# Patient Record
Sex: FEMALE | Race: WHITE | Employment: FULL TIME | URBAN - METROPOLITAN AREA
[De-identification: names, ages, dates, MRNs, and addresses within clinical notes are randomized per-mention and may not be internally consistent; named-entity substitution may affect disease eponyms.]

---

## 2017-05-25 ENCOUNTER — TELEPHONE (OUTPATIENT)
Dept: INTERNAL MEDICINE CLINIC | Age: 32
End: 2017-05-25

## 2017-05-26 ENCOUNTER — TELEPHONE (OUTPATIENT)
Dept: INTERNAL MEDICINE CLINIC | Age: 32
End: 2017-05-26

## 2017-05-26 DIAGNOSIS — G93.5 CHIARI I MALFORMATION (HCC): ICD-10-CM

## 2017-05-26 DIAGNOSIS — G89.29 CHRONIC NONINTRACTABLE HEADACHE, UNSPECIFIED HEADACHE TYPE: Primary | ICD-10-CM

## 2017-05-26 DIAGNOSIS — R51.9 FREQUENT HEADACHES: Primary | ICD-10-CM

## 2017-05-26 DIAGNOSIS — R51.9 CHRONIC NONINTRACTABLE HEADACHE, UNSPECIFIED HEADACHE TYPE: Primary | ICD-10-CM

## 2017-05-30 ENCOUNTER — HOSPITAL ENCOUNTER (OUTPATIENT)
Dept: MRI IMAGING | Age: 32
Discharge: HOME | End: 2017-05-30
Attending: INTERNAL MEDICINE | Admitting: INTERNAL MEDICINE

## 2017-05-30 ENCOUNTER — TELEPHONE (OUTPATIENT)
Dept: INTERNAL MEDICINE CLINIC | Age: 32
End: 2017-05-30

## 2017-05-30 DIAGNOSIS — R51.9 HEADACHE: ICD-10-CM

## 2017-05-30 DIAGNOSIS — R51.9 FREQUENT HEADACHES: ICD-10-CM

## 2017-05-30 DIAGNOSIS — G93.5 CHIARI I MALFORMATION (HCC): ICD-10-CM

## 2017-06-01 ENCOUNTER — OFFICE VISIT (OUTPATIENT)
Dept: INTERNAL MEDICINE CLINIC | Age: 32
End: 2017-06-01

## 2017-06-01 VITALS
RESPIRATION RATE: 16 BRPM | WEIGHT: 113.8 LBS | DIASTOLIC BLOOD PRESSURE: 60 MMHG | HEIGHT: 62 IN | BODY MASS INDEX: 20.94 KG/M2 | HEART RATE: 70 BPM | SYSTOLIC BLOOD PRESSURE: 90 MMHG

## 2017-06-01 DIAGNOSIS — G44.89 OTHER HEADACHE SYNDROME: ICD-10-CM

## 2017-06-01 DIAGNOSIS — H53.9 TRANSIENT VISUAL DISTURBANCE, BILATERAL: ICD-10-CM

## 2017-06-01 DIAGNOSIS — Z00.00 ANNUAL PHYSICAL EXAM: Primary | ICD-10-CM

## 2017-06-01 PROCEDURE — 99395 PREV VISIT EST AGE 18-39: CPT | Performed by: INTERNAL MEDICINE

## 2017-06-01 ASSESSMENT — PATIENT HEALTH QUESTIONNAIRE - PHQ9
1. LITTLE INTEREST OR PLEASURE IN DOING THINGS: 0
SUM OF ALL RESPONSES TO PHQ9 QUESTIONS 1 & 2: 0
2. FEELING DOWN, DEPRESSED OR HOPELESS: 0
SUM OF ALL RESPONSES TO PHQ QUESTIONS 1-9: 0

## 2017-12-26 ENCOUNTER — TELEPHONE (OUTPATIENT)
Dept: INTERNAL MEDICINE CLINIC | Age: 32
End: 2017-12-26

## 2018-07-13 ENCOUNTER — TELEPHONE (OUTPATIENT)
Dept: INTERNAL MEDICINE CLINIC | Age: 33
End: 2018-07-13

## 2018-07-13 ENCOUNTER — OFFICE VISIT (OUTPATIENT)
Dept: INTERNAL MEDICINE CLINIC | Age: 33
End: 2018-07-13

## 2018-07-13 VITALS
OXYGEN SATURATION: 99 % | HEART RATE: 89 BPM | DIASTOLIC BLOOD PRESSURE: 62 MMHG | WEIGHT: 109.8 LBS | BODY MASS INDEX: 20.08 KG/M2 | SYSTOLIC BLOOD PRESSURE: 106 MMHG

## 2018-07-13 DIAGNOSIS — G43.009 MIGRAINE WITHOUT AURA AND WITHOUT STATUS MIGRAINOSUS, NOT INTRACTABLE: Primary | ICD-10-CM

## 2018-07-13 PROCEDURE — 99213 OFFICE O/P EST LOW 20 MIN: CPT | Performed by: INTERNAL MEDICINE

## 2018-07-13 RX ORDER — FROVATRIPTAN SUCCINATE 2.5 MG/1
TABLET, FILM COATED ORAL
Qty: 30 TABLET | Refills: 2 | Status: SHIPPED | OUTPATIENT
Start: 2018-07-13 | End: 2018-07-13

## 2018-07-13 RX ORDER — SUMATRIPTAN 100 MG/1
TABLET, FILM COATED ORAL
Qty: 9 TABLET | Refills: 11 | Status: SHIPPED | OUTPATIENT
Start: 2018-07-13 | End: 2019-07-23 | Stop reason: SDUPTHER

## 2018-07-13 ASSESSMENT — PATIENT HEALTH QUESTIONNAIRE - PHQ9
SUM OF ALL RESPONSES TO PHQ QUESTIONS 1-9: 0
1. LITTLE INTEREST OR PLEASURE IN DOING THINGS: 0
SUM OF ALL RESPONSES TO PHQ9 QUESTIONS 1 & 2: 0

## 2018-07-13 NOTE — TELEPHONE ENCOUNTER
Pt states the med below is not covered by insurance. But patient states the pharmacy said they will cover sumatriptan. She is asking if Dr Cartwright Sender can order this for her.     frovatriptan succinate (FROVA) 2.5 MG TABS 30 tablet 2 7/13/2018     Sig: Take one tablet at the onset of a headache, repeat in 2 hours if needed.  Take no more than 2 doses in a 24 hour period      Pharmacy:  [facility] [address], [facility] - F [phone]    #[phone]

## 2018-09-20 ENCOUNTER — OFFICE VISIT (OUTPATIENT)
Dept: INTERNAL MEDICINE CLINIC | Age: 33
End: 2018-09-20

## 2018-09-20 VITALS
HEIGHT: 62 IN | DIASTOLIC BLOOD PRESSURE: 60 MMHG | BODY MASS INDEX: 20.2 KG/M2 | WEIGHT: 109.8 LBS | RESPIRATION RATE: 16 BRPM | SYSTOLIC BLOOD PRESSURE: 118 MMHG | HEART RATE: 80 BPM

## 2018-09-20 DIAGNOSIS — Z00.00 ANNUAL PHYSICAL EXAM: Primary | ICD-10-CM

## 2018-09-20 DIAGNOSIS — Z23 NEED FOR INFLUENZA VACCINATION: ICD-10-CM

## 2018-09-20 PROCEDURE — 90682 RIV4 VACC RECOMBINANT DNA IM: CPT | Performed by: INTERNAL MEDICINE

## 2018-09-20 PROCEDURE — 90471 IMMUNIZATION ADMIN: CPT | Performed by: INTERNAL MEDICINE

## 2018-09-20 PROCEDURE — 99395 PREV VISIT EST AGE 18-39: CPT | Performed by: INTERNAL MEDICINE

## 2018-09-20 ASSESSMENT — PATIENT HEALTH QUESTIONNAIRE - PHQ9
2. FEELING DOWN, DEPRESSED OR HOPELESS: 0
SUM OF ALL RESPONSES TO PHQ QUESTIONS 1-9: 0
SUM OF ALL RESPONSES TO PHQ9 QUESTIONS 1 & 2: 0
SUM OF ALL RESPONSES TO PHQ QUESTIONS 1-9: 0
1. LITTLE INTEREST OR PLEASURE IN DOING THINGS: 0

## 2018-09-20 NOTE — PROGRESS NOTES
oropharyngeal exudate. Neck: Normal range of motion. Neck supple. No thyromegaly present. Cardiovascular: Normal rate, regular rhythm and normal heart sounds. Pulmonary/Chest: Effort normal and breath sounds normal. She has no wheezes. She has no rales. Abdominal: Soft. Musculoskeletal: She exhibits no edema. Lymphadenopathy:     She has no cervical adenopathy. Neurological: She is alert and oriented to person, place, and time. Vitals reviewed. Waist circumference 25 inches    Assessment:      Annual exam-  She is up-to-date with health maintenance. Check glucose and lipids. She will follow-up as needed.       Plan:      When necessary follow-up        Baylee Campos MD

## 2018-10-01 ENCOUNTER — OFFICE VISIT (OUTPATIENT)
Dept: DERMATOLOGY | Age: 33
End: 2018-10-01
Payer: COMMERCIAL

## 2018-10-01 DIAGNOSIS — L81.2 FRECKLES: ICD-10-CM

## 2018-10-01 DIAGNOSIS — D22.9 MULTIPLE NEVI: Primary | ICD-10-CM

## 2018-10-01 DIAGNOSIS — L68.0 HIRSUTISM: ICD-10-CM

## 2018-10-01 PROCEDURE — 99213 OFFICE O/P EST LOW 20 MIN: CPT | Performed by: DERMATOLOGY

## 2018-10-01 NOTE — PROGRESS NOTES
[facility] Dermatology  Jatin Berumen MD  [phone]      Jasmin Weaver  1985    29 y.o. female     Date of Visit: 10/1/2018    Chief Complaint: skin moles    History of Present Illness:    1. She presents today for evaluation of multiple nevi on the trunk and extremities. She reports 1 darker lesion on the right breast.  Not aware of any recent changes. She is also noted a couple nevi in her scalp. 2.  She has fair skin and freckles easily. 3.  She also complains of few terminal hairs on her chin. She has a family history of melanoma-her sister has a history of melanoma in situ. Engaged now - getting  next June. Review of Systems:  Skin: No rash or itching. Past Medical History, Family History, Surgical History, Medications and Allergies reviewed. Past Medical History:   Diagnosis Date    Chiari I malformation ([facility] [address]     repaired    Gastric ulcer     ? from advil    Vocal cord nodules     chronic hoarseness/ voice training no help     Past Surgical History:   Procedure Laterality Date    BRAIN SURGERY  2007    decomp surg/Shabbabian     WISDOM TOOTH EXTRACTION  age 17       No Known Allergies  Outpatient Prescriptions Marked as Taking for the 10/1/18 encounter (Office Visit) with Mehran Knutson MD   Medication Sig Dispense Refill    SUMAtriptan (IMITREX) 100 MG tablet Take one tablet at the onset of a headache, repeat in 2 hours if needed. Take no more than 2 doses in a 24 hour period. 9 tablet 11    desogestrel-ethinyl estradiol (APRI) 0.15-30 MG-MCG per tablet Take 1 tablet by mouth daily. 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 Physical Examination       The following were examined and determined to be normal: Psych/Neuro, Scalp/hair, Head/face, Conjunctivae/eyelids, Gums/teeth/lips, Neck, Breast/axilla/chest, Abdomen, Back, RUE, LUE, RLE, LLE and Nails/digits.     The following were examined and

## 2019-01-07 ENCOUNTER — TELEPHONE (OUTPATIENT)
Dept: DERMATOLOGY | Age: 34
End: 2019-01-07

## 2019-11-25 ENCOUNTER — OFFICE VISIT (OUTPATIENT)
Dept: ENT CLINIC | Age: 34
End: 2019-11-25
Payer: COMMERCIAL

## 2019-11-25 VITALS
DIASTOLIC BLOOD PRESSURE: 75 MMHG | BODY MASS INDEX: 20.24 KG/M2 | SYSTOLIC BLOOD PRESSURE: 112 MMHG | WEIGHT: 110 LBS | HEART RATE: 77 BPM | TEMPERATURE: 98.3 F | HEIGHT: 62 IN

## 2019-11-25 DIAGNOSIS — K13.79 MUCOCELE OF BUCCAL MUCOSA: Primary | ICD-10-CM

## 2019-11-25 PROCEDURE — 99243 OFF/OP CNSLTJ NEW/EST LOW 30: CPT | Performed by: OTOLARYNGOLOGY

## 2019-11-25 ASSESSMENT — ENCOUNTER SYMPTOMS
FACIAL SWELLING: 0
BLOOD IN STOOL: 0
VOMITING: 0
CHOKING: 0
CHEST TIGHTNESS: 0
VOICE CHANGE: 0
EYE PAIN: 0
TROUBLE SWALLOWING: 0
DIARRHEA: 0
SORE THROAT: 0
STRIDOR: 0
COLOR CHANGE: 0
SHORTNESS OF BREATH: 0
SINUS PRESSURE: 0
SINUS PAIN: 0
CONSTIPATION: 0
RHINORRHEA: 0
WHEEZING: 0
NAUSEA: 0
APNEA: 0
EYE DISCHARGE: 0
COUGH: 0
BACK PAIN: 0

## 2020-01-13 ENCOUNTER — OFFICE VISIT (OUTPATIENT)
Dept: DERMATOLOGY | Age: 35
End: 2020-01-13
Payer: COMMERCIAL

## 2020-01-13 PROCEDURE — 99213 OFFICE O/P EST LOW 20 MIN: CPT | Performed by: DERMATOLOGY

## 2020-01-13 RX ORDER — CLINDAMYCIN PHOSPHATE 10 UG/ML
LOTION TOPICAL
Qty: 60 ML | Refills: 2 | Status: SHIPPED | OUTPATIENT
Start: 2020-01-13 | End: 2020-02-28

## 2020-01-13 RX ORDER — MEDROXYPROGESTERONE ACETATE 10 MG/1
10 TABLET ORAL DAILY
Status: ON HOLD | COMMUNITY
End: 2021-03-26

## 2020-01-13 NOTE — PROGRESS NOTES
[facility] Dermatology  Adria Conteh Praveena Marie  1985    36 y.o. female     Date of Visit: 1/13/2020    Chief Complaint: skin moles    History of Present Illness:    1. She presents today for a full skin exam.  She reports multiple nevi on the trunk and extremities. The dark nevus on the right breast seen at last visit seems to be stable. 2.  She also complains of recent acne on the chin. Began soon after going off of OCP - currently trying to get pregnant. 3.  She also complains of mild dark hair growth on the lower chin. Not getting any worse. Worked up in the past for PCOS which was reportedly negative. 4.  She has a family history of melanoma-her sister and father both have a history of melanoma in situ. Review of Systems:  Skin: no rash. Past Medical History, Family History, Surgical History, Medications and Allergies reviewed. Past Medical History:   Diagnosis Date    Chiari I malformation ([address]     repaired    Gastric ulcer     ? from advil    Vocal cord nodules     chronic hoarseness/ voice training no help     Past Surgical History:   Procedure Laterality Date    BRAIN SURGERY  2007    decomp surg/Shabbabian     WISDOM TOOTH EXTRACTION  age 17       No Known Allergies  Outpatient Medications Marked as Taking for the 1/13/20 encounter (Office Visit) with Carmen Mendez MD   Medication Sig Dispense Refill    medroxyPROGESTERone (PROVERA) 10 MG tablet Take 10 mg by mouth daily      clindamycin (CLEOCIN T) 1 % lotion Apply to affected areas on the face twice daily for acne. 60 mL 2    SUMAtriptan (IMITREX) 100 MG tablet TAKE ONE TABLET BY MOUTH AT ONSET OF HEADACHE; MAY REPEAT ONE TABLET IN 2 HOURS IF NEEDED.  TAKE NO MORE THAN 2 DOSES IN A 24 HOUR PERIOD 9 tablet 8       Social History:  Occupation:  Works for [employer], Dad is [employer]      Physical Examination       The following were examined and determined to be normal: Psych/Neuro, Scalp/hair, Conjunctivae/eyelids, Gums/teeth/lips, Neck, Breast/axilla/chest, Abdomen, Back, RUE, LUE, RLE, LLE and Nails/digits. The following were examined and determined to be abnormal: Head/face. Well-appearing. 1.  Scattered on the trunk and extremities are multiple well-defined round oval uniformly brown macules and few papules. Right medial lower breast with a well-defined oval-shaped dark brown macule measuring 6 mm in size (no change compared to prior photo). 2.  Chin with few erythematous papules. 3.  Submental region and lower portion of the chin with few terminal hairs. Assessment and Plan     1. Multiple nevi - benign appearing    Sun protective behaviors and self skin examinations were encouraged. Call for any new or concerning lesions. 2. Adult acne - mild    Clindamycin solution twice daily as needed (edu re: pregnancy category B). 3. Hirsutism - very mild    Consider laser in the future (edu re no preg). 4.  Family history of melanoma -     Sun protective behaviors and self skin examinations were encouraged. Call for any new or concerning lesions. Return in about 1 year (around 1/13/2021).

## 2020-03-06 ENCOUNTER — OFFICE VISIT (OUTPATIENT)
Dept: PSYCHOLOGY | Age: 35
End: 2020-03-06
Payer: COMMERCIAL

## 2020-03-06 PROCEDURE — 90791 PSYCH DIAGNOSTIC EVALUATION: CPT | Performed by: PSYCHOLOGIST

## 2020-03-06 ASSESSMENT — PATIENT HEALTH QUESTIONNAIRE - PHQ9
1. LITTLE INTEREST OR PLEASURE IN DOING THINGS: 0
SUM OF ALL RESPONSES TO PHQ QUESTIONS 1-9: 0
SUM OF ALL RESPONSES TO PHQ QUESTIONS 1-9: 0
SUM OF ALL RESPONSES TO PHQ9 QUESTIONS 1 & 2: 0
2. FEELING DOWN, DEPRESSED OR HOPELESS: 0

## 2020-03-06 ASSESSMENT — ANXIETY QUESTIONNAIRES
2. NOT BEING ABLE TO STOP OR CONTROL WORRYING: 3-NEARLY EVERY DAY
3. WORRYING TOO MUCH ABOUT DIFFERENT THINGS: 2-OVER HALF THE DAYS
4. TROUBLE RELAXING: 2-OVER HALF THE DAYS
1. FEELING NERVOUS, ANXIOUS, OR ON EDGE: 2-OVER HALF THE DAYS
7. FEELING AFRAID AS IF SOMETHING AWFUL MIGHT HAPPEN: 0-NOT AT ALL
6. BECOMING EASILY ANNOYED OR IRRITABLE: 1-SEVERAL DAYS
5. BEING SO RESTLESS THAT IT IS HARD TO SIT STILL: 1-SEVERAL DAYS
GAD7 TOTAL SCORE: 11

## 2020-03-06 NOTE — PROGRESS NOTES
pleasure No  Impulsive behavior No  Speech    normal rate, normal volume and well articulated  Mood    Anxious  Affect    anxiety  Thought Content    intact  Thought Process    linear, goal directed and coherent  Associations    logical connections  Insight    Good  Judgment    Intact  Orientation    oriented to person, place, time, and general circumstances  Memory    recent and remote memory intact  Attention/Concentration    intact  Morbid ideation No  Suicide Assessment    no suicidal ideation    History:    Medications:   Current Outpatient Medications   Medication Sig Dispense Refill    clomiPHENE Citrate (CLOMID PO) Take by mouth      sertraline (ZOLOFT) 50 MG tablet Take 1 tablet by mouth daily 30 tablet 1    medroxyPROGESTERone (PROVERA) 10 MG tablet Take 10 mg by mouth daily      SUMAtriptan (IMITREX) 100 MG tablet TAKE ONE TABLET BY MOUTH AT ONSET OF HEADACHE; MAY REPEAT ONE TABLET IN 2 HOURS IF NEEDED. TAKE NO MORE THAN 2 DOSES IN A 24 HOUR PERIOD 9 tablet 8    therapeutic multivitamin-minerals (THERAGRAN-M) tablet Take 1 tablet by mouth daily. (Patient not taking: Reported on 1/13/2020) 30 tablet 11     No current facility-administered medications for this visit. Social History:   Social History     Socioeconomic History    Marital status:      Spouse name: Not on file    Number of children: Not on file    Years of education: Not on file    Highest education level: Not on file   Occupational History    Occupation: Sr  Marketing   Social Needs    Financial resource strain: Not on file    Food insecurity:     Worry: Not on file     Inability: Not on file    Transportation needs:     Medical: Not on file     Non-medical: Not on file   Tobacco Use    Smoking status: Never Smoker    Smokeless tobacco: Never Used   Substance and Sexual Activity    Alcohol use:  Yes     Alcohol/week: 0.0 - 12.0 standard drinks    Drug use: No    Sexual activity: Not on file   Lifestyle  Physical activity:     Days per week: Not on file     Minutes per session: Not on file    Stress: Not on file   Relationships    Social connections:     Talks on phone: Not on file     Gets together: Not on file     Attends Temple service: Not on file     Active member of club or organization: Not on file     Attends meetings of clubs or organizations: Not on file     Relationship status: Not on file    Intimate partner violence:     Fear of current or ex partner: Not on file     Emotionally abused: Not on file     Physically abused: Not on file     Forced sexual activity: Not on file   Other Topics Concern    Not on file   Social History Narrative    Not on file       TOBACCO:   reports that she has never smoked. She has never used smokeless tobacco.  ETOH:   reports current alcohol use. Family History:   Family History   Problem Relation Age of Onset    Other Brother         Chondrosarcoma       A:    Pt presenting with chronic anxiety disorder in the HERACLIO and Panic disorder spectrum. Focused on psycho-ed about the chronic nature of her anxiety disorder and the impact on her mind/body. She is apprehensive about starting her sertraline but after weighing the pros and cons, she is going to start per Dr Esthela Bo recommendation. Pt agreed to start tomorrow. Rest of appt focused on benefits of psychotherapy, pt a bit nervous about \"full dose\" of psychotherapy so going to start with SSRI medication trial and a \"low dose\" with me, we will focus on relaxation training skills to support daily anxiety management.      PHQ Scores 3/6/2020 2/28/2020 10/11/2019 9/20/2018 7/13/2018 6/1/2017   PHQ2 Score 0 0 0 0 0 0   PHQ9 Score 0 0 0 0 0 0     Interpretation of Total Score Depression Severity: 1-4 = Minimal depression, 5-9 = Mild depression, 10-14 = Moderate depression, 15-19 = Moderately severe depression, 20-27 = Severe depression    HERACLIO 7 SCORE 3/6/2020   HERACLIO-7 Total Score 11     Interpretation of HERACLIO-7

## 2020-03-10 NOTE — PATIENT INSTRUCTIONS
1. Start sertraline medication trial per Dr Tanesha Lomax  2. Consider referral for individual psychotherapy referral down the road  3. Slow down any amount  4. Read The Anxiety and Phobia workbook by Cheikh Rojas, PhD for anxiety management skills  5.  Return to clinic for Dr Nola Luu in 2 weeks, schedule 2 more consults every 2 weeks

## 2020-04-03 ENCOUNTER — TELEMEDICINE (OUTPATIENT)
Dept: PSYCHOLOGY | Age: 35
End: 2020-04-03
Payer: COMMERCIAL

## 2020-04-03 PROCEDURE — 90834 PSYTX W PT 45 MINUTES: CPT | Performed by: PSYCHOLOGIST

## 2020-04-03 NOTE — PROGRESS NOTES
Behavioral Health Consultation Follow-up  Silke Espitia PsyD  Psychologist  4/3/2020  9:38 AM    NOTE - this visit conducted via audiovisual means : MyChart, Doxy, etc, in accordance with CMS guidelines. Visit initiated at patient or caregiver request with permission to bill to insurer granted. Telemedicine APA guidelines were reviewed and discussed. Patient gave verbal consent for teleservices and will sign a consent form when feasible. Location of provider: [address] [place] [address] Internal medicine  Location of patient: home    Time spent with Patient: 45 minutes  This is patient's second  [facility] appointment. Reason for Consult:  HERACLIO, Panic disorder   Referring Provider: Imelda Choi MD  [address]  [place], [address]    Feedback given to PCP. S:    Last appt: 3/6. Anxiety has been \"better\" outside of work.  and pt working together to get more structure in their personal lives. Worry for work a bit worse due to wondering whether boss thinks pt is doing \"enough\". More psycho-ed about the chronic nature of her anxiety disorder, how she has mastered cognitive strategies and treatment should focus on more relaxation training/body focused strategies--she agreed. Psych med: 12 days 25 mg sertraline, first few days \"noticed a difference\", 12 days in increased sertraline 50 mg, 1 month in, feels it is helping    Stressor: Fertility: 2 rounds clomid.  Tested progesterone levels: so low so sent to fertility specialist last week, virtual consultation with infertility specialist on Monday    Work: Working from home which is a challenge    Healthy coping: Multiple walks per day with 7 month old lab pet    O:  MSE:    Appearance    alert, cooperative  Appetite normal  Sleep disturbance better  Fatigue better  Loss of pleasure No  Impulsive behavior No  Speech    normal rate, normal volume and well articulated  Mood    Anxiety better with new sertraline dose  Affect    normal affect  Thought Content

## 2020-04-17 ENCOUNTER — TELEMEDICINE (OUTPATIENT)
Dept: PSYCHOLOGY | Age: 35
End: 2020-04-17
Payer: COMMERCIAL

## 2020-04-17 PROCEDURE — 90832 PSYTX W PT 30 MINUTES: CPT | Performed by: PSYCHOLOGIST

## 2020-04-17 NOTE — PROGRESS NOTES
Behavioral Health Consultation Follow-up  Guerrero Jones PsyD  Psychologist  4/17/2020  8:35 AM      NOTE - this visit conducted via audiovisual means : MyChart, Doxy, etc, in accordance with CMS guidelines. During YPHQP-89 public health emergency. Visit initiated at patient or caregiver request with permission to bill to insurer granted. Telemedicine APA guidelines were reviewed and discussed. Patient gave verbal consent for teleservices and will sign a consent form when feasible. Location of provider: [facility] Primary care  Location of patient: home    Time spent with Patient: 22 minutes  This is patient's third  [facility] appointment. Reason for Consult:  HERACLIO  Referring Provider: Lisy Monteiro MD  [address] [facility], [address] [place] [address]    Feedback given to PCP. S:    Last appt: 4/3. Anxiety levels fairly well controlled, had 1 \"bad\" yesterday. Psych med: Feels sertraline 50 mg is working most days. More focus on refining breathing practice daily, bed time when perhaps cortisol levels a bit higher. CBT intervention: Going to keep \"should\" log so we can identify negative self talk that ignites this \"should fire\" which exacerbates her anxiety levels.      Physical movement: [place] classes on line, but \"not the same\" as going to studio, she is going to keep trying to get on track with some type of physical movement class 2 x per week    O:  MSE:    Appearance    alert, cooperative  Appetite normal  Sleep disturbance No  Fatigue No  Loss of pleasure No  Impulsive behavior No  Speech    normal rate, normal volume and well articulated  Mood    Anxiety levels beginning to be more consistently controlled with sertraline  Affect    normal affect  Thought Content    intact  Thought Process    linear, goal directed and coherent  Associations    logical connections  Insight    Good  Judgment    Intact  Orientation    oriented to person, place, time, and general circumstances  Memory    recent and remote

## 2020-05-01 ENCOUNTER — TELEMEDICINE (OUTPATIENT)
Dept: PSYCHOLOGY | Age: 35
End: 2020-05-01
Payer: COMMERCIAL

## 2020-05-01 PROCEDURE — 90834 PSYTX W PT 45 MINUTES: CPT | Performed by: PSYCHOLOGIST

## 2020-05-01 NOTE — PROGRESS NOTES
oriented to person, place, time, and general circumstances  Memory    recent and remote memory intact  Attention/Concentration    intact  Morbid ideation No  Suicide Assessment    no suicidal ideation      History:    Medications:   Current Outpatient Medications   Medication Sig Dispense Refill    clomiPHENE Citrate (CLOMID PO) Take by mouth      sertraline (ZOLOFT) 50 MG tablet Take 1 tablet by mouth daily 30 tablet 1    medroxyPROGESTERone (PROVERA) 10 MG tablet Take 10 mg by mouth daily      SUMAtriptan (IMITREX) 100 MG tablet TAKE ONE TABLET BY MOUTH AT ONSET OF HEADACHE; MAY REPEAT ONE TABLET IN 2 HOURS IF NEEDED. TAKE NO MORE THAN 2 DOSES IN A 24 HOUR PERIOD 9 tablet 8    therapeutic multivitamin-minerals (THERAGRAN-M) tablet Take 1 tablet by mouth daily. (Patient not taking: Reported on 1/13/2020) 30 tablet 11     No current facility-administered medications for this visit. Social History:   Social History     Socioeconomic History    Marital status:      Spouse name: Not on file    Number of children: Not on file    Years of education: Not on file    Highest education level: Not on file   Occupational History    Occupation: Sr  Marketing   Social Needs    Financial resource strain: Not on file    Food insecurity     Worry: Not on file     Inability: Not on file   [employer] needs     Medical: Not on file     Non-medical: Not on file   Tobacco Use    Smoking status: Never Smoker    Smokeless tobacco: Never Used   Substance and Sexual Activity    Alcohol use:  Yes     Alcohol/week: 0.0 - 12.0 standard drinks    Drug use: No    Sexual activity: Not on file   Lifestyle    Physical activity     Days per week: Not on file     Minutes per session: Not on file    Stress: Not on file   Relationships    Social connections     Talks on phone: Not on file     Gets together: Not on file     Attends Restorationism service: Not on file     Active member of club or organization: diaphragmatic breathing and add mountain/solid breathing exercise as well, daily if possible, in the am  7.  Return to clinic for Dr Bhumika Rodriguez in 2-3 weeks, understands will be VV

## 2020-06-29 ENCOUNTER — OFFICE VISIT (OUTPATIENT)
Dept: PRIMARY CARE CLINIC | Age: 35
End: 2020-06-29

## 2020-06-30 LAB
SARS-COV-2: NOT DETECTED
SOURCE: NORMAL

## 2020-09-17 LAB
C. TRACHOMATIS, EXTERNAL RESULT: NEGATIVE
N. GONORRHOEAE, EXTERNAL RESULT: NEGATIVE

## 2020-10-19 LAB
ABO, EXTERNAL RESULT: NORMAL
HEP B, EXTERNAL RESULT: NEGATIVE
HEPATITIS C ANTIBODY, EXTERNAL RESULT: NEGATIVE
HIV, EXTERNAL RESULT: NONREACTIVE
RPR, EXTERNAL RESULT: NONREACTIVE
RUBELLA TITER, EXTERNAL RESULT: NORMAL

## 2021-01-18 ENCOUNTER — OFFICE VISIT (OUTPATIENT)
Dept: DERMATOLOGY | Age: 36
End: 2021-01-18
Payer: COMMERCIAL

## 2021-01-18 VITALS — TEMPERATURE: 97.7 F

## 2021-01-18 DIAGNOSIS — D22.9 MULTIPLE NEVI: Primary | ICD-10-CM

## 2021-01-18 DIAGNOSIS — L70.9 ADULT ACNE: ICD-10-CM

## 2021-01-18 DIAGNOSIS — L30.9 DERMATITIS: ICD-10-CM

## 2021-01-18 PROCEDURE — 99213 OFFICE O/P EST LOW 20 MIN: CPT | Performed by: DERMATOLOGY

## 2021-01-18 NOTE — PROGRESS NOTES
[facility] Dermatology  Kenna De La Cruz MD  [address]  1985    30 y.o. female     Date of Visit: 1/18/2021    Chief Complaint: skin moles    History of Present Illness:    1. She presents today for a full skin examination. She reports multiple moles on the trunk and extremities-not aware of any changes in size, color, or shape. 2.  She also returns today to follow-up for adult acne-has been quiescent. 3.  She complains of a new onset pruritic eruption on the lower back. It typically improves with CeraVe cream.    She has a family history of melanoma-her sister and father both have a history of melanoma in situ. Review of Systems:  Skin: No other concerning skin lesions or growths. Past Medical History, Family History, Surgical History, Medications and Allergies reviewed. Past Medical History:   Diagnosis Date    Chiari I malformation ([facility] [address]     repaired    Gastric ulcer     ? from advil    Vocal cord nodules     chronic hoarseness/ voice training no help     Past Surgical History:   Procedure Laterality Date    BRAIN SURGERY  2007    decomp surg/Shabbabian     WISDOM TOOTH EXTRACTION  age 17       No Known Allergies  Outpatient Medications Marked as Taking for the 1/18/21 encounter (Office Visit) with Anna Kendall MD   Medication Sig Dispense Refill    Prenatal Vit-DSS-Fe Cbn-FA (PRENATAL ADVANTAGE PO) Take by mouth         Social History:  Occupation:  Works for [employer], Dad is Chayo Pittman      Physical Examination       The following were examined and determined to be normal: Psych/Neuro, Scalp/hair, Head/face, Conjunctivae/eyelids, Gums/teeth/lips, Neck, Breast/axilla/chest, Abdomen, RUE, LUE, RLE, LLE and Nails/digits. The following were examined and determined to be abnormal: Back. Well appearing. 1.  Trunk and extremities with multiple well defined round to oval smooth brown macules and papules. 2.  Clear.       3.  Lower back -

## 2021-03-15 LAB — GBS, EXTERNAL RESULT: POSITIVE

## 2021-03-26 ENCOUNTER — HOSPITAL ENCOUNTER (INPATIENT)
Age: 36
LOS: 3 days | Discharge: HOME | End: 2021-03-29
Attending: OBSTETRICS & GYNECOLOGY | Admitting: OBSTETRICS & GYNECOLOGY
Payer: COMMERCIAL

## 2021-03-26 ENCOUNTER — ANESTHESIA EVENT (OUTPATIENT)
Dept: LABOR AND DELIVERY | Age: 36
End: 2021-03-26
Payer: COMMERCIAL

## 2021-03-26 ENCOUNTER — ANESTHESIA (OUTPATIENT)
Dept: LABOR AND DELIVERY | Age: 36
End: 2021-03-26
Payer: COMMERCIAL

## 2021-03-26 PROBLEM — Z37.9 NORMAL LABOR: Status: ACTIVE | Noted: 2021-03-26

## 2021-03-26 LAB
ABO/RH: NORMAL
AMPHETAMINE SCREEN, URINE: NORMAL
ANTIBODY SCREEN: NORMAL
BARBITURATE SCREEN URINE: NORMAL
BASOPHILS ABSOLUTE: 0 K/UL (ref 0–0.2)
BASOPHILS RELATIVE PERCENT: 0.3 %
BENZODIAZEPINE SCREEN, URINE: NORMAL
BUPRENORPHINE URINE: NORMAL
CANNABINOID SCREEN URINE: NORMAL
COCAINE METABOLITE SCREEN URINE: NORMAL
EOSINOPHILS ABSOLUTE: 0 K/UL (ref 0–0.6)
EOSINOPHILS RELATIVE PERCENT: 0.3 %
HCT VFR BLD CALC: 36.5 % (ref 36–48)
HEMOGLOBIN: 12.7 G/DL (ref 12–16)
LYMPHOCYTES ABSOLUTE: 2 K/UL (ref 1–5.1)
LYMPHOCYTES RELATIVE PERCENT: 17.4 %
Lab: NORMAL
MCH RBC QN AUTO: 31.9 PG (ref 26–34)
MCHC RBC AUTO-ENTMCNC: 34.7 G/DL (ref 31–36)
MCV RBC AUTO: 91.9 FL (ref 80–100)
METHADONE SCREEN, URINE: NORMAL
MONOCYTES ABSOLUTE: 0.6 K/UL (ref 0–1.3)
MONOCYTES RELATIVE PERCENT: 5.4 %
NEUTROPHILS ABSOLUTE: 8.8 K/UL (ref 1.7–7.7)
NEUTROPHILS RELATIVE PERCENT: 76.6 %
OPIATE SCREEN URINE: NORMAL
OXYCODONE URINE: NORMAL
PDW BLD-RTO: 12.8 % (ref 12.4–15.4)
PH UA: 7
PHENCYCLIDINE SCREEN URINE: NORMAL
PLATELET # BLD: 248 K/UL (ref 135–450)
PMV BLD AUTO: 8.9 FL (ref 5–10.5)
PROPOXYPHENE SCREEN: NORMAL
RBC # BLD: 3.97 M/UL (ref 4–5.2)
SARS-COV-2, NAAT: NOT DETECTED
WBC # BLD: 11.5 K/UL (ref 4–11)

## 2021-03-26 PROCEDURE — 86901 BLOOD TYPING SEROLOGIC RH(D): CPT

## 2021-03-26 PROCEDURE — 85025 COMPLETE CBC W/AUTO DIFF WBC: CPT

## 2021-03-26 PROCEDURE — 2500000003 HC RX 250 WO HCPCS

## 2021-03-26 PROCEDURE — 10907ZC DRAINAGE OF AMNIOTIC FLUID, THERAPEUTIC FROM PRODUCTS OF CONCEPTION, VIA NATURAL OR ARTIFICIAL OPENING: ICD-10-PCS | Performed by: OBSTETRICS & GYNECOLOGY

## 2021-03-26 PROCEDURE — 86780 TREPONEMA PALLIDUM: CPT

## 2021-03-26 PROCEDURE — 87635 SARS-COV-2 COVID-19 AMP PRB: CPT

## 2021-03-26 PROCEDURE — 86900 BLOOD TYPING SEROLOGIC ABO: CPT

## 2021-03-26 PROCEDURE — 2580000003 HC RX 258: Performed by: OBSTETRICS & GYNECOLOGY

## 2021-03-26 PROCEDURE — 6360000002 HC RX W HCPCS: Performed by: OBSTETRICS & GYNECOLOGY

## 2021-03-26 PROCEDURE — 6360000002 HC RX W HCPCS: Performed by: ANESTHESIOLOGY

## 2021-03-26 PROCEDURE — 51702 INSERT TEMP BLADDER CATH: CPT

## 2021-03-26 PROCEDURE — 2580000003 HC RX 258: Performed by: NURSE ANESTHETIST, CERTIFIED REGISTERED

## 2021-03-26 PROCEDURE — 86850 RBC ANTIBODY SCREEN: CPT

## 2021-03-26 PROCEDURE — 1220000000 HC SEMI PRIVATE OB R&B

## 2021-03-26 PROCEDURE — 3700000025 EPIDURAL BLOCK: Performed by: ANESTHESIOLOGY

## 2021-03-26 PROCEDURE — 2500000003 HC RX 250 WO HCPCS: Performed by: NURSE ANESTHETIST, CERTIFIED REGISTERED

## 2021-03-26 PROCEDURE — 80307 DRUG TEST PRSMV CHEM ANLYZR: CPT

## 2021-03-26 PROCEDURE — 2500000003 HC RX 250 WO HCPCS: Performed by: ANESTHESIOLOGY

## 2021-03-26 RX ORDER — NALBUPHINE HCL 10 MG/ML
5 AMPUL (ML) INJECTION EVERY 4 HOURS PRN
Status: DISCONTINUED | OUTPATIENT
Start: 2021-03-26 | End: 2021-03-27

## 2021-03-26 RX ORDER — SODIUM CHLORIDE 0.9 % (FLUSH) 0.9 %
10 SYRINGE (ML) INJECTION EVERY 12 HOURS SCHEDULED
Status: DISCONTINUED | OUTPATIENT
Start: 2021-03-26 | End: 2021-03-27

## 2021-03-26 RX ORDER — SODIUM CHLORIDE 0.9 % (FLUSH) 0.9 %
10 SYRINGE (ML) INJECTION PRN
Status: DISCONTINUED | OUTPATIENT
Start: 2021-03-26 | End: 2021-03-27

## 2021-03-26 RX ORDER — SODIUM CHLORIDE 9 MG/ML
25 INJECTION, SOLUTION INTRAVENOUS PRN
Status: DISCONTINUED | OUTPATIENT
Start: 2021-03-26 | End: 2021-03-29 | Stop reason: HOSPADM

## 2021-03-26 RX ORDER — NALOXONE HYDROCHLORIDE 0.4 MG/ML
0.4 INJECTION, SOLUTION INTRAMUSCULAR; INTRAVENOUS; SUBCUTANEOUS PRN
Status: DISCONTINUED | OUTPATIENT
Start: 2021-03-26 | End: 2021-03-27

## 2021-03-26 RX ORDER — SODIUM CHLORIDE, SODIUM LACTATE, POTASSIUM CHLORIDE, CALCIUM CHLORIDE 600; 310; 30; 20 MG/100ML; MG/100ML; MG/100ML; MG/100ML
INJECTION, SOLUTION INTRAVENOUS CONTINUOUS
Status: DISCONTINUED | OUTPATIENT
Start: 2021-03-26 | End: 2021-03-26

## 2021-03-26 RX ORDER — SODIUM CHLORIDE, SODIUM LACTATE, POTASSIUM CHLORIDE, CALCIUM CHLORIDE 600; 310; 30; 20 MG/100ML; MG/100ML; MG/100ML; MG/100ML
INJECTION, SOLUTION INTRAVENOUS CONTINUOUS PRN
Status: DISCONTINUED | OUTPATIENT
Start: 2021-03-26 | End: 2021-03-27 | Stop reason: SDUPTHER

## 2021-03-26 RX ORDER — ONDANSETRON 2 MG/ML
4 INJECTION INTRAMUSCULAR; INTRAVENOUS EVERY 6 HOURS PRN
Status: DISCONTINUED | OUTPATIENT
Start: 2021-03-26 | End: 2021-03-29 | Stop reason: HOSPADM

## 2021-03-26 RX ORDER — BUPIVACAINE HYDROCHLORIDE 5 MG/ML
INJECTION, SOLUTION EPIDURAL; INTRACAUDAL PRN
Status: DISCONTINUED | OUTPATIENT
Start: 2021-03-26 | End: 2021-03-27 | Stop reason: SDUPTHER

## 2021-03-26 RX ORDER — DEXTROSE, SODIUM CHLORIDE, SODIUM LACTATE, POTASSIUM CHLORIDE, AND CALCIUM CHLORIDE 5; .6; .31; .03; .02 G/100ML; G/100ML; G/100ML; G/100ML; G/100ML
INJECTION, SOLUTION INTRAVENOUS CONTINUOUS
Status: DISCONTINUED | OUTPATIENT
Start: 2021-03-26 | End: 2021-03-29 | Stop reason: HOSPADM

## 2021-03-26 RX ORDER — ONDANSETRON 2 MG/ML
4 INJECTION INTRAMUSCULAR; INTRAVENOUS EVERY 6 HOURS PRN
Status: DISCONTINUED | OUTPATIENT
Start: 2021-03-26 | End: 2021-03-27

## 2021-03-26 RX ORDER — DIPHENHYDRAMINE HYDROCHLORIDE 50 MG/ML
25 INJECTION INTRAMUSCULAR; INTRAVENOUS EVERY 6 HOURS PRN
Status: DISCONTINUED | OUTPATIENT
Start: 2021-03-26 | End: 2021-03-27

## 2021-03-26 RX ORDER — LIDOCAINE HYDROCHLORIDE AND EPINEPHRINE 20; 5 MG/ML; UG/ML
INJECTION, SOLUTION EPIDURAL; INFILTRATION; INTRACAUDAL; PERINEURAL PRN
Status: DISCONTINUED | OUTPATIENT
Start: 2021-03-26 | End: 2021-03-27 | Stop reason: SDUPTHER

## 2021-03-26 RX ADMIN — NALBUPHINE HYDROCHLORIDE 5 MG: 10 INJECTION, SOLUTION INTRAMUSCULAR; INTRAVENOUS; SUBCUTANEOUS at 16:26

## 2021-03-26 RX ADMIN — Medication 2.5 MILLION UNITS: at 21:17

## 2021-03-26 RX ADMIN — SODIUM CHLORIDE, SODIUM LACTATE, POTASSIUM CHLORIDE, CALCIUM CHLORIDE AND DEXTROSE MONOHYDRATE: 5; 600; 310; 30; 20 INJECTION, SOLUTION INTRAVENOUS at 16:26

## 2021-03-26 RX ADMIN — SODIUM CHLORIDE, SODIUM LACTATE, POTASSIUM CHLORIDE, AND CALCIUM CHLORIDE: .6; .31; .03; .02 INJECTION, SOLUTION INTRAVENOUS at 13:00

## 2021-03-26 RX ADMIN — Medication 2.5 MILLION UNITS: at 16:54

## 2021-03-26 RX ADMIN — LIDOCAINE HYDROCHLORIDE,EPINEPHRINE BITARTRATE 4 ML: 20; .005 INJECTION, SOLUTION EPIDURAL; INFILTRATION; INTRACAUDAL; PERINEURAL at 22:21

## 2021-03-26 RX ADMIN — DEXTROSE MONOHYDRATE 5 MILLION UNITS: 5 INJECTION INTRAVENOUS at 12:53

## 2021-03-26 RX ADMIN — SODIUM CHLORIDE, POTASSIUM CHLORIDE, SODIUM LACTATE AND CALCIUM CHLORIDE: 600; 310; 30; 20 INJECTION, SOLUTION INTRAVENOUS at 12:54

## 2021-03-26 RX ADMIN — BUPIVACAINE HYDROCHLORIDE 4 ML: 5 INJECTION, SOLUTION EPIDURAL; INTRACAUDAL at 22:07

## 2021-03-26 RX ADMIN — Medication 1 MILLI-UNITS/MIN: at 17:56

## 2021-03-26 RX ADMIN — Medication 10 ML/HR: at 13:11

## 2021-03-26 RX ADMIN — Medication 10 ML: at 13:10

## 2021-03-26 ASSESSMENT — PAIN SCALES - GENERAL
PAINLEVEL_OUTOF10: 8
PAINLEVEL_OUTOF10: 0

## 2021-03-26 NOTE — FLOWSHEET NOTE
Spoke with Dr Kailee Garcia and orders given to admit patient to labor and delivery. Verbal orders given and patient admitted into room 2243.

## 2021-03-26 NOTE — PROGRESS NOTES
Pt is comfortable on epidural. Ctrxs q 3 min  EFM: cat 1  SVE: 3/100/-2, BBOW  Plan: AROM at next check.  Cont PCN   Electronically signed by Tonia Bowser MD on 3/26/2021 at 4:10 PM

## 2021-03-26 NOTE — H&P
Department of Obstetrics and Gynecology   Obstetrics History and Physical        CHIEF COMPLAINT:  contractions    HISTORY OF PRESENT ILLNESS:    Baylee Chamberlain  is a 30 y.o.  female at 38w0d presents with a chief complaint as above and is being admitted for latent labor. Patient was checked in the office 4 h ago, and has no cx change, but states that ctrxs pain is unbearable and would like to be admitted for pain control     Estimated Due Date: Estimated Date of Delivery: 21    PRENATAL CARE: Complicated by: 1. Type 1 Arnold Chiari malformation (was cleared for Epidural and ) 2. GBS pos 3. IUI pregnancy 4. PCOS    PAST OB HISTORY:  OB History        1    Para        Term                AB        Living           SAB        TAB        Ectopic        Molar        Multiple        Live Births                  Past Medical History:        Diagnosis Date    Chi[address] I malformation ([facility] [address]     repaired    Gastric ulcer     ? from advil    Vocal cord nodules     chronic hoarseness/ voice training no help     Past Surgical History:        Procedure Laterality Date    BRAIN SURGERY      decomp surg/Shabbabian     WISDOM TOOTH EXTRACTION  age 17     Allergies:  Patient has no known allergies.   Social History:    Social History     Socioeconomic History    Marital status:      Spouse name: Not on file    Number of children: Not on file    Years of education: Not on file    Highest education level: Not on file   Occupational History    Occupation: Sr  Marketing   Social Needs    Financial resource strain: Not on file    Food insecurity     Worry: Not on file     Inability: Not on file   [employer] needs     Medical: Not on file     Non-medical: Not on file   Tobacco Use    Smoking status: Never Smoker    Smokeless tobacco: Never Used   Substance and Sexual Activity    Alcohol use: Not Currently     Alcohol/week: 0.0 - 12.0 standard drinks    Drug use: No    Sexual Electronically signed by Stefanie Pinto MD on 3/26/2021 at 1:05 PM

## 2021-03-26 NOTE — ANESTHESIA PROCEDURE NOTES
Epidural Block    Patient location during procedure: OB  Start time: 3/26/2021 1:00 PM  End time: 3/26/2021 1:17 PM  Reason for block: labor epidural  Preanesthetic Checklist  Completed: patient identified, IV checked, site marked, risks and benefits discussed, surgical consent, monitors and equipment checked, pre-op evaluation, timeout performed, anesthesia consent given, oxygen available and patient being monitored  Epidural  Patient position: sitting  Prep: Betadine and site prepped and draped  Patient monitoring: continuous pulse ox and frequent blood pressure checks  Approach: midline  Location: lumbar (1-5)  Injection technique: YADI saline  Guidance: paresthesia technique  Provider prep: mask and sterile gloves  Needle  Needle type: Tuohy   Needle gauge: 17 G  Needle length: 3.5 in  Needle insertion depth: 4 cm  Catheter type: side hole  Catheter size: 20G. Catheter at skin depth: 9 cm  Test dose: negative  Assessment  Sensory level: T6  Hemodynamics: stable  Attempts: 1  Additional Notes  Sterile tech. , sitting position, Lidocaine skin wheel, secured with steri-strips, tegaderm dressing, dosed & infusion with Marcaine 0.125% with Fentanyl 3 mcg/ml, infusion rate at 3.3 ml every 20 minutes.

## 2021-03-26 NOTE — FLOWSHEET NOTE
Dr. Houston Arora at bedside. SVE:3/100/-2. Membranes intact.  Pt turned to far left side after check

## 2021-03-26 NOTE — PLAN OF CARE
Problem: Anxiety:  Goal: Level of anxiety will decrease  Description: Level of anxiety will decrease  Outcome: Ongoing     Problem: Fluid Volume - Imbalance:  Goal: Absence of intrapartum hemorrhage signs and symptoms  Description: Absence of intrapartum hemorrhage signs and symptoms  Outcome: Ongoing   No s.s  Problem: Infection - Intrapartum Infection:  Goal: Will show no infection signs and symptoms  Description: Will show no infection signs and symptoms  Outcome: Ongoing  Problem: Pain:  Goal: Control of acute pain  Description: Control of acute pain  Outcome: Ongoing

## 2021-03-26 NOTE — FLOWSHEET NOTE
Bedside handoff report received from [facility] CAREY, care assumed at this time. Patient provided with fresh ice water. Denies further needs at this time.  Call light in reach

## 2021-03-26 NOTE — ANESTHESIA PRE PROCEDURE
in sodium chloride 0.9% 100 mL epidural    Continuous PRN Krishna Sidles, APRN - CRNA 10 mL/hr at 03/26/21 1311 10 mL/hr at 03/26/21 1311    fentaNYL 3 mcg/ml bupivacaine 0.125% in sodium chloride 0.9% 100 mL epidural    PRN Krishna Sidles, APRN - CRNA   10 mL at 03/26/21 1310       Allergies:  No Known Allergies    Problem List:    Patient Active Problem List   Diagnosis Code    Allergic rhinitis J30.9    Raynaud disease I73.00    History of gastric ulcer Z87.19    Chiari I malformation (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 [address]     repaired    Gastric ulcer     ? from advil    Vocal cord nodules     chronic hoarseness/ voice training no help       Past Surgical History:        Procedure Laterality Date    BRAIN SURGERY  2007    decomp surg/Shabbabian     WISDOM TOOTH EXTRACTION  age 17       Social History:    Social History     Tobacco Use    Smoking status: Never Smoker    Smokeless tobacco: Never Used   Substance Use Topics    Alcohol use: Not Currently     Alcohol/week: 0.0 - 12.0 standard drinks                                Counseling given: Not Answered      Vital Signs (Current):   Vitals:    03/26/21 1329 03/26/21 1331 03/26/21 1333 03/26/21 1335   BP: 131/78 126/77 137/84 133/79   Pulse: 89 100 103 101   Resp:       Temp:       TempSrc:       Weight:       Height:                                                  BP Readings from Last 3 Encounters:   03/26/21 133/79   10/28/20 110/70   02/28/20 128/80       NPO Status: Time of last liquid consumption: 1130                        Time of last solid consumption: 1130                        Date of last liquid consumption: 03/26/21                        Date of last solid food consumption: 03/26/21    BMI:   Wt Readings from Last 3 Encounters:   03/26/21 136 lb (61.7 kg)   10/28/20 115 lb 8 oz (52.4 kg)   02/28/20 112 lb (50.8 kg)     Body mass index is 24.87 kg/m². CBC:   Lab Results   Component Value Date    WBC 11.5 03/26/2021    RBC 3.97 03/26/2021    HGB 12.7 03/26/2021    HCT 36.5 03/26/2021    MCV 91.9 03/26/2021    RDW 12.8 03/26/2021     03/26/2021       CMP:   Lab Results   Component Value Date     10/11/2019    K 4.9 10/11/2019     10/11/2019    CO2 26 10/11/2019    BUN 12 10/11/2019    CREATININE <0.5 10/11/2019    GFRAA >60 10/11/2019    AGRATIO 3.9 10/11/2019    LABGLOM >60 10/11/2019    GLUCOSE 84 10/11/2019    PROT 6.8 10/11/2019    CALCIUM 10.2 10/11/2019    BILITOT 0.6 10/11/2019    ALKPHOS 84 10/11/2019    AST 27 10/11/2019    ALT 19 10/11/2019       POC Tests: No results for input(s): POCGLU, POCNA, POCK, POCCL, POCBUN, POCHEMO, POCHCT in the last 72 hours. Coags: No results found for: PROTIME, INR, APTT    HCG (If Applicable): No results found for: PREGTESTUR, PREGSERUM, HCG, HCGQUANT     ABGs: No results found for: PHART, PO2ART, XOG0JJH, NGO1YOB, BEART, S1DEAVEE     Type & Screen (If Applicable):  No results found for: LABABO, LABRH    Drug/Infectious Status (If Applicable):  No results found for: HIV, HEPCAB    COVID-19 Screening (If Applicable):   Lab Results   Component Value Date    COVID19 Not Detected 06/29/2020           Anesthesia Evaluation  Patient summary reviewed and Nursing notes reviewed  Airway: Mallampati: II  TM distance: >3 FB   Neck ROM: full  Mouth opening: > = 3 FB Dental: normal exam         Pulmonary:Negative Pulmonary ROS and normal exam              Patient did not smoke on day of surgery. Cardiovascular:Negative CV ROS             Beta Blocker:  Not on Beta Blocker         Neuro/Psych:   Negative Neuro/Psych ROS              GI/Hepatic/Renal:   (+) PUD,           Endo/Other: Negative Endo/Other ROS             Pt had no PAT visit       Abdominal:           Vascular: negative vascular ROS.                                        Anesthesia Plan      epidural     ASA 2             Anesthetic plan and risks discussed with spouse and patient. Use of blood products discussed with patient and spouse whom consented to blood products. Plan discussed with CRNA. OB History        1    Para        Term                AB        Living           SAB        TAB        Ectopic        Molar        Multiple        Live Births                    Inis Larry is a 30y.o. year-old female admitted to [address] [place] [address] for MEGAN. Gestational age is 38w0d. Her Body mass index is 24.87 kg/m². She was seen, examined and her chart was reviewed (including anesthesia, drug and allergy history). No interval changes are noted to her history and physical examination. (except as noted above).     Risks/benefits/alternatives of both neuraxial and general anesthesia were discussed and she agrees to proceed at the direction of the care team.    MANUELA Pillai - CRNA  2021  1:56 PM    MANUELA Pillai - SARAH   3/26/2021

## 2021-03-27 LAB — TOTAL SYPHILLIS IGG/IGM: NORMAL

## 2021-03-27 PROCEDURE — 6360000002 HC RX W HCPCS

## 2021-03-27 PROCEDURE — 6370000000 HC RX 637 (ALT 250 FOR IP): Performed by: OBSTETRICS & GYNECOLOGY

## 2021-03-27 PROCEDURE — 7200000001 HC VAGINAL DELIVERY

## 2021-03-27 PROCEDURE — 2500000003 HC RX 250 WO HCPCS: Performed by: NURSE ANESTHETIST, CERTIFIED REGISTERED

## 2021-03-27 PROCEDURE — 2500000003 HC RX 250 WO HCPCS

## 2021-03-27 PROCEDURE — 51702 INSERT TEMP BLADDER CATH: CPT

## 2021-03-27 PROCEDURE — 2580000003 HC RX 258: Performed by: OBSTETRICS & GYNECOLOGY

## 2021-03-27 PROCEDURE — 1220000000 HC SEMI PRIVATE OB R&B

## 2021-03-27 PROCEDURE — 0KQM0ZZ REPAIR PERINEUM MUSCLE, OPEN APPROACH: ICD-10-PCS | Performed by: OBSTETRICS & GYNECOLOGY

## 2021-03-27 RX ORDER — CEPHALEXIN 250 MG/1
500 CAPSULE ORAL EVERY 8 HOURS SCHEDULED
Status: COMPLETED | OUTPATIENT
Start: 2021-03-27 | End: 2021-03-28

## 2021-03-27 RX ORDER — METRONIDAZOLE 500 MG/1
500 TABLET ORAL EVERY 8 HOURS SCHEDULED
Qty: 12 TABLET | Refills: 0 | Status: SHIPPED | OUTPATIENT
Start: 2021-03-27 | End: 2021-03-31

## 2021-03-27 RX ORDER — HYDROCODONE BITARTRATE AND ACETAMINOPHEN 5; 325 MG/1; MG/1
1 TABLET ORAL EVERY 8 HOURS PRN
Qty: 15 TABLET | Refills: 0 | Status: SHIPPED | OUTPATIENT
Start: 2021-03-27 | End: 2021-04-01

## 2021-03-27 RX ORDER — HYDROCODONE BITARTRATE AND ACETAMINOPHEN 5; 325 MG/1; MG/1
1 TABLET ORAL EVERY 4 HOURS PRN
Status: DISCONTINUED | OUTPATIENT
Start: 2021-03-27 | End: 2021-03-29 | Stop reason: HOSPADM

## 2021-03-27 RX ORDER — IBUPROFEN 400 MG/1
800 TABLET ORAL EVERY 8 HOURS PRN
Status: DISCONTINUED | OUTPATIENT
Start: 2021-03-27 | End: 2021-03-29 | Stop reason: HOSPADM

## 2021-03-27 RX ORDER — SODIUM CHLORIDE 0.9 % (FLUSH) 0.9 %
10 SYRINGE (ML) INJECTION EVERY 12 HOURS SCHEDULED
Status: DISCONTINUED | OUTPATIENT
Start: 2021-03-27 | End: 2021-03-29 | Stop reason: HOSPADM

## 2021-03-27 RX ORDER — PSEUDOEPHEDRINE HCL 30 MG
100 TABLET ORAL 2 TIMES DAILY
Qty: 14 CAPSULE | Refills: 1 | Status: SHIPPED | OUTPATIENT
Start: 2021-03-27 | End: 2021-04-03

## 2021-03-27 RX ORDER — SODIUM CHLORIDE 0.9 % (FLUSH) 0.9 %
10 SYRINGE (ML) INJECTION PRN
Status: DISCONTINUED | OUTPATIENT
Start: 2021-03-27 | End: 2021-03-29 | Stop reason: HOSPADM

## 2021-03-27 RX ORDER — NIFEDIPINE 30 MG/1
30 TABLET, EXTENDED RELEASE ORAL DAILY
Status: DISCONTINUED | OUTPATIENT
Start: 2021-03-27 | End: 2021-03-29 | Stop reason: HOSPADM

## 2021-03-27 RX ORDER — METRONIDAZOLE 500 MG/1
500 TABLET ORAL EVERY 8 HOURS SCHEDULED
Status: COMPLETED | OUTPATIENT
Start: 2021-03-27 | End: 2021-03-28

## 2021-03-27 RX ORDER — LANOLIN 100 %
OINTMENT (GRAM) TOPICAL PRN
Status: DISCONTINUED | OUTPATIENT
Start: 2021-03-27 | End: 2021-03-29 | Stop reason: HOSPADM

## 2021-03-27 RX ORDER — NICOTINE 21 MG/24HR
1 PATCH, TRANSDERMAL 24 HOURS TRANSDERMAL DAILY
Status: DISCONTINUED | OUTPATIENT
Start: 2021-03-27 | End: 2021-03-29 | Stop reason: HOSPADM

## 2021-03-27 RX ORDER — CEPHALEXIN 500 MG/1
500 CAPSULE ORAL EVERY 8 HOURS SCHEDULED
Qty: 12 CAPSULE | Refills: 0 | Status: SHIPPED | OUTPATIENT
Start: 2021-03-27 | End: 2021-03-31

## 2021-03-27 RX ORDER — LIDOCAINE HYDROCHLORIDE 10 MG/ML
INJECTION, SOLUTION EPIDURAL; INFILTRATION; INTRACAUDAL; PERINEURAL
Status: COMPLETED
Start: 2021-03-27 | End: 2021-03-27

## 2021-03-27 RX ORDER — HYDROCODONE BITARTRATE AND ACETAMINOPHEN 5; 325 MG/1; MG/1
2 TABLET ORAL EVERY 4 HOURS PRN
Status: DISCONTINUED | OUTPATIENT
Start: 2021-03-27 | End: 2021-03-29 | Stop reason: HOSPADM

## 2021-03-27 RX ORDER — METHYLERGONOVINE MALEATE 0.2 MG/ML
200 INJECTION INTRAVENOUS
Status: ACTIVE | OUTPATIENT
Start: 2021-03-27 | End: 2021-03-27

## 2021-03-27 RX ORDER — DOCUSATE SODIUM 100 MG/1
100 CAPSULE, LIQUID FILLED ORAL 2 TIMES DAILY
Status: DISCONTINUED | OUTPATIENT
Start: 2021-03-27 | End: 2021-03-29 | Stop reason: HOSPADM

## 2021-03-27 RX ORDER — ACETAMINOPHEN 325 MG/1
650 TABLET ORAL EVERY 4 HOURS PRN
Status: DISCONTINUED | OUTPATIENT
Start: 2021-03-27 | End: 2021-03-29 | Stop reason: HOSPADM

## 2021-03-27 RX ADMIN — METRONIDAZOLE 500 MG: 500 TABLET ORAL at 15:13

## 2021-03-27 RX ADMIN — IBUPROFEN 800 MG: 400 TABLET, FILM COATED ORAL at 07:07

## 2021-03-27 RX ADMIN — CEPHALEXIN 500 MG: 250 CAPSULE ORAL at 10:19

## 2021-03-27 RX ADMIN — Medication 10 ML: at 10:20

## 2021-03-27 RX ADMIN — HYDROCODONE BITARTRATE AND ACETAMINOPHEN 2 TABLET: 5; 325 TABLET ORAL at 18:04

## 2021-03-27 RX ADMIN — DOCUSATE SODIUM 100 MG: 100 CAPSULE, LIQUID FILLED ORAL at 22:04

## 2021-03-27 RX ADMIN — LIDOCAINE HYDROCHLORIDE,EPINEPHRINE BITARTRATE 5 ML: 20; .005 INJECTION, SOLUTION EPIDURAL; INFILTRATION; INTRACAUDAL; PERINEURAL at 03:10

## 2021-03-27 RX ADMIN — LIDOCAINE HYDROCHLORIDE 20 ML: 10 INJECTION, SOLUTION EPIDURAL; INFILTRATION; INTRACAUDAL; PERINEURAL at 03:20

## 2021-03-27 RX ADMIN — METRONIDAZOLE 500 MG: 500 TABLET ORAL at 22:04

## 2021-03-27 RX ADMIN — NIFEDIPINE 30 MG: 30 TABLET, FILM COATED, EXTENDED RELEASE ORAL at 15:12

## 2021-03-27 RX ADMIN — SODIUM CHLORIDE, SODIUM LACTATE, POTASSIUM CHLORIDE, CALCIUM CHLORIDE AND DEXTROSE MONOHYDRATE: 5; 600; 310; 30; 20 INJECTION, SOLUTION INTRAVENOUS at 00:16

## 2021-03-27 RX ADMIN — HYDROCODONE BITARTRATE AND ACETAMINOPHEN 1 TABLET: 5; 325 TABLET ORAL at 07:07

## 2021-03-27 RX ADMIN — IBUPROFEN 800 MG: 400 TABLET, FILM COATED ORAL at 16:31

## 2021-03-27 RX ADMIN — METRONIDAZOLE 500 MG: 500 TABLET ORAL at 10:19

## 2021-03-27 RX ADMIN — CEFAZOLIN 2000 MG: 10 INJECTION, POWDER, FOR SOLUTION INTRAVENOUS at 03:34

## 2021-03-27 RX ADMIN — CEPHALEXIN 500 MG: 250 CAPSULE ORAL at 15:13

## 2021-03-27 RX ADMIN — DOCUSATE SODIUM 100 MG: 100 CAPSULE, LIQUID FILLED ORAL at 10:19

## 2021-03-27 RX ADMIN — LIDOCAINE HYDROCHLORIDE,EPINEPHRINE BITARTRATE 5 ML: 20; .005 INJECTION, SOLUTION EPIDURAL; INFILTRATION; INTRACAUDAL; PERINEURAL at 01:05

## 2021-03-27 RX ADMIN — CEPHALEXIN 500 MG: 250 CAPSULE ORAL at 22:04

## 2021-03-27 RX ADMIN — HYDROCODONE BITARTRATE AND ACETAMINOPHEN 1 TABLET: 5; 325 TABLET ORAL at 14:01

## 2021-03-27 NOTE — FLOWSHEET NOTE
RN remains at bedside. Patient remains pushing at this time positioning between left lateral and right lateral alternatively. Patient pushing effectively with every other contraction at this time.

## 2021-03-27 NOTE — LACTATION NOTE
This note was copied from a baby's chart. I went into patient room to deliver PP supplies per RN. Mother was sitting up, breastfeeding infant on left breast, cross cradle position. Mother was expressing pain on her face and with sounds. I asked the mother is she was in pain. Mother stated that breastfeeding was super painful. I explained to the mother the basic C-hold and basic proper latch. Mother then asked me if I could demonstrate what I was explaining. I then demonstrated the C-hold, proper latch with Right breast. Infant breast fed for 8 minutes with multiple SRS without causing pain to the mother per mother. Infant then fell asleep. Gave the mother positive encouragement. Mother stated she understood and had no further questions. RN then entered room and I verbally explained to the RN what I went over with the mother.

## 2021-03-27 NOTE — FLOWSHEET NOTE
Patient continues pushing at this time. RN remains at bedside. Patient positioned to left lateral and right lateral alternatively during pushing.

## 2021-03-27 NOTE — FLOWSHEET NOTE
Report given to [facility]. Aleja Santiago RN aware that this RN was just in the patient room and patient requested to be left to rest at this time. Infant in crib at bedside, resps even and unlabored and call light within patient's reach upon exiting.

## 2021-03-27 NOTE — PLAN OF CARE
Problem: VAGINAL DELIVERY - RECOVERY AND POST PARTUM  Goal: Vital signs are medically acceptable  Outcome: Met This Shift  Goal: Patient will remain free of falls  Outcome: Met This Shift  Goal: Fundus firm at midline  Outcome: Met This Shift  Goal: Moderate rubra without clots, no purulent discharge, no foul smelling lochia  Outcome: Met This Shift  Goal: Verbalizes understanding of normal bowel function resumption  Outcome: Met This Shift  Goal: Edema will be absent or minimal  Outcome: Met This Shift  Goal: Breasts are soft with nipple integrity intact  Outcome: Met This Shift  Goal: Demonstrates appropriate breast feeding techniques  Outcome: Met This Shift  Goal: Appropriate behavior observed  Outcome: Met This Shift  Goal: Positive Mother-Baby interactions are observed  Outcome: Met This Shift  Goal: Perineum intact without discharge or hematoma  Outcome: Met This Shift  Goal: Ambulates independently  Outcome: Met This Shift     Problem: PAIN  Goal: Patient's pain/discomfort is manageable  Outcome: Met This Shift     Problem: KNOWLEDGE DEFICIT  Goal: Patient/S.O. demonstrates understanding of disease process, treatment plan, medications, and discharge instructions. Outcome: Met This Shift     Problem: VAGINAL DELIVERY - RECOVERY AND POST PARTUM  Goal: Empties bladder  Outcome: Ongoing     Problem: Anxiety:  Goal: Level of anxiety will decrease  Description: Level of anxiety will decrease  3/27/2021 0814 by Lin Francis RN  Outcome: Completed     Problem: Breathing Pattern - Ineffective:  Goal: Able to breathe comfortably  Description: Able to breathe comfortably  3/27/2021 0814 by Lin Francis RN  Outcome: Completed     Problem: Fluid Volume - Imbalance:  Goal: Absence of imbalanced fluid volume signs and symptoms  Description: Absence of imbalanced fluid volume signs and symptoms  3/27/2021 0814 by Lin Francis RN  Outcome: Completed  Goal: Absence of intrapartum hemorrhage signs and symptoms  Description: Absence of intrapartum hemorrhage signs and symptoms  3/27/2021 0814 by Kathleen. Stacey Thornton RN  Outcome: Completed  Goal: Absence of imbalanced fluid volume signs and symptoms  Description: Absence of imbalanced fluid volume signs and symptoms  Outcome: Completed  Goal: Absence of postpartum hemorrhage signs and symptoms  Description: Absence of postpartum hemorrhage signs and symptoms  Outcome: Completed     Problem: Infection - Intrapartum Infection:  Goal: Will show no infection signs and symptoms  Description: Will show no infection signs and symptoms  3/27/2021 0814 by Kathleen. Stacey Thornton RN  Outcome: Completed     Problem: Labor Process - Prolonged:  Goal: Labor progression, first stage, within specified pattern  Description: Labor progression, first stage, within specified pattern  3/27/2021 0814 by Kathleen. Stacey Thornton RN  Outcome: Completed  Goal: Labor progession, second stage, within specified pattern  Description: Labor progession, second stage, within specified pattern  3/27/2021 08 by Kathleen. Stacey Thornton RN  Outcome: Completed  Goal: Uterine contractions within specified parameters  Description: Uterine contractions within specified parameters  3/27/2021 0814 by Kathleen. Stacey Thornton RN  Outcome: Completed     Problem:  Screening:  Goal: Ability to make informed decisions regarding treatment has improved  Description: Ability to make informed decisions regarding treatment has improved  3/27/2021 0814 by Kathleen. Stacey Thornton RN  Outcome: Completed     Problem: Pain - Acute:  Goal: Pain level will decrease  Description: Pain level will decrease  3/27/2021 0814 by Kathleen. Stacey Thornton RN  Outcome: Completed  Goal: Able to cope with pain  Description: Able to cope with pain  3/27/2021 0814 by Nelida Briggs.  Stacey Thornton RN  Outcome: Completed  Goal: Pain level will decrease  Description: Pain level will decrease  Outcome: Completed     Problem: Tissue Perfusion - Uteroplacental, Altered:  Goal: Absence of abnormal fetal heart rate pattern  Description: Absence of abnormal fetal heart rate pattern  3/27/2021 0814 by Kathleen. Norman Lauren RN  Outcome: Completed     Problem: Urinary Retention:  Goal: Experiences of bladder distention will decrease  Description: Experiences of bladder distention will decrease  3/27/2021 0814 by Kathleen. Norman Lauren RN  Outcome: Completed  Goal: Urinary elimination within specified parameters  Description: Urinary elimination within specified parameters  3/27/2021 0814 by Kathleen. Norman Lauren RN  Outcome: Completed     Problem: Pain:  Goal: Pain level will decrease  Description: Pain level will decrease  3/27/2021 0814 by Kathleen. Norman Lauren RN  Outcome: Completed  Goal: Control of acute pain  Description: Control of acute pain  3/27/2021 0814 by Kathleen. Norman Lauren RN  Outcome: Completed  Goal: Control of chronic pain  Description: Control of chronic pain  3/27/2021 0814 by Annette Burgos.  Norman Lauren RN  Outcome: Completed     Problem: Discharge Planning:  Goal: Discharged to appropriate level of care  Description: Discharged to appropriate level of care  Outcome: Completed     Problem: Constipation:  Goal: Bowel elimination is within specified parameters  Description: Bowel elimination is within specified parameters  Outcome: Completed     Problem: Infection - Risk of, Puerperal Infection:  Goal: Will show no infection signs and symptoms  Description: Will show no infection signs and symptoms  Outcome: Completed     Problem: Mood - Altered:  Goal: Mood stable  Description: Mood stable  Outcome: Completed

## 2021-03-27 NOTE — FLOWSHEET NOTE
RN remained at bedside throughout pushing. EFM continuously assessed. Vaginal delivery of viable infant.

## 2021-03-27 NOTE — FLOWSHEET NOTE
of 38+1 week female infant at this time. Infant placed on mom's abdomen, dried and stimulated. Absence of respiratory effort despite stimulation attempts at 30 seconds of life. Cord clamped and cut, and infant was taken to warmer. Care assumed by Charge nurse [facility] RN and SCN nurse [address] RN.

## 2021-03-27 NOTE — FLOWSHEET NOTE
Patient actively pushing. RN remains in continuous attendance at the bedside. Assessment & evaluation of fetal heart rate ongoing via continuous EFM. Dr. Hal Duke remains at bedside at this time.

## 2021-03-27 NOTE — FLOWSHEET NOTE
Pt sleeping in bed with infant in bassinet at her bedside. White board updated, call light within reach.

## 2021-03-27 NOTE — FLOWSHEET NOTE
Padgett removed. Pt up to the bathroom to wash up and do bib care. Discussed need to void within 4-6hrs, pt verbalized understanding.

## 2021-03-27 NOTE — PROGRESS NOTES
Patient has hard time controlling her anxiety, worrying about every aspect of her so far normal labor unreasonably, that cases fluctuation in her BP  VSS  EFM: cat 1  [place] q 2-3 min on 2 mu/pit. SVE: C/0/LOP, AROM, clear fluid   P: Labor down for 1 h to allow more descent, and then attempt manual head rotation.

## 2021-03-27 NOTE — PROGRESS NOTES
Department of Obstetrics and Gynecology  Vaginal Delivery Postpartum Rounds    SUBJECTIVE:  Pain is controlled with non-steroidal anti-inflammatory drugs or narcotic analgesics. Her lochia is normal.    OBJECTIVE:  Vital Signs: BP (!) 144/85   Pulse 70   Temp 98.2 °F (36.8 °C) (Oral)   Resp 16   Ht 5' 2\" (1.575 m)   Wt 136 lb (61.7 kg)   Breastfeeding Unknown   BMI 24.87 kg/m²   Appearance/Psychiatric: awake, alert, cooperative, no apparent distress, appears stated age  Constitutional: The patient is well nourished. Cardiovascular: She does have edema over perineum   Respiratory: Respiratory effort is normal.  Gastrointestinal: Soft, non tender, uterine fundus is firm below umbilicus  Extremities: nontender to palpation  Perineum: intact     LABS / IMAGING:  CBC:   Lab Results   Component Value Date    WBC 11.5 2021    RBC 3.97 2021    HGB 12.7 2021    HCT 36.5 2021    MCV 91.9 2021    MCH 31.9 2021    MCHC 34.7 2021    RDW 12.8 2021     2021    MPV 8.9 2021       Lab Results   Component Value Date    WBC 11.5 2021    RBC 3.97 2021    HGB 12.7 2021    HCT 36.5 2021    MCV 91.9 2021    MCH 31.9 2021    MCHC 34.7 2021    RDW 12.8 2021     2021    MPV 8.9 2021     Lab Results   Component Value Date     10/11/2019    K 4.9 10/11/2019     10/11/2019    CO2 26 10/11/2019    BUN 12 10/11/2019    CREATININE <0.5 10/11/2019    GLUCOSE 84 10/11/2019    CALCIUM 10.2 10/11/2019     No results found for: POCGLU  Lab Results   Component Value Date    ALKPHOS 84 10/11/2019    ALT 19 10/11/2019    AST 27 10/11/2019    PROT 6.8 10/11/2019    BILITOT 0.6 10/11/2019    LABALBU 5.4 10/11/2019     Lab Results   Component Value Date    PHUR 7.0 2021     No results found for: LABRPR    ASSESSMENT:    Postpartum Day 0 s/p     PLAN:   1. Continue routine postpartum care   2. Discharge home on Postpartum Day 2  3. Return to office in 1 week   4.  Postpartum instructions reviewed and all patient's Questions answered    Electronically signed by Leigha Hou MD on 3/27/2021 at 11:44 AM

## 2021-03-27 NOTE — PLAN OF CARE
Problem: Anxiety:  Goal: Level of anxiety will decrease  Description: Level of anxiety will decrease  3/27/2021 0814 by Kathleen. Stacey Thornton RN  Outcome: Completed     Problem: Breathing Pattern - Ineffective:  Goal: Able to breathe comfortably  Description: Able to breathe comfortably  3/27/2021 0814 by Kathleen. Stacey Thornton RN  Outcome: Completed     Problem: Fluid Volume - Imbalance:  Goal: Absence of imbalanced fluid volume signs and symptoms  Description: Absence of imbalanced fluid volume signs and symptoms  3/27/2021 0814 by Kathleen. Stacey Thornton RN  Outcome: Completed  Goal: Absence of intrapartum hemorrhage signs and symptoms  Description: Absence of intrapartum hemorrhage signs and symptoms  3/27/2021 0814 by Kathleen. Stacey Thornton RN  Outcome: Completed  Goal: Absence of imbalanced fluid volume signs and symptoms  Description: Absence of imbalanced fluid volume signs and symptoms  Outcome: Completed  Goal: Absence of postpartum hemorrhage signs and symptoms  Description: Absence of postpartum hemorrhage signs and symptoms  Outcome: Completed     Problem: Infection - Intrapartum Infection:  Goal: Will show no infection signs and symptoms  Description: Will show no infection signs and symptoms  3/27/2021 0814 by Kathleen. Stacey Thornton RN  Outcome: Completed     Problem: Labor Process - Prolonged:  Goal: Labor progression, first stage, within specified pattern  Description: Labor progression, first stage, within specified pattern  3/27/2021 0814 by Kathleen. Stacey Thornton RN  Outcome: Completed  Goal: Labor progession, second stage, within specified pattern  Description: Labor progession, second stage, within specified pattern  3/27/2021 0814 by Kathleen. Stacey Thornton RN  Outcome: Completed  Goal: Uterine contractions within specified parameters  Description: Uterine contractions within specified parameters  3/27/2021 0814 by Kathleen.  Stacey Thornton RN  Outcome: Completed     Problem:  Screening:  Goal: Ability to make informed decisions regarding treatment has improved  Description: Ability to make informed decisions regarding treatment has improved  3/27/2021 0814 by Kathleen. Paxton Albert RN  Outcome: Completed     Problem: Pain - Acute:  Goal: Pain level will decrease  Description: Pain level will decrease  3/27/2021 0814 by Kathleen. Paxton Albert RN  Outcome: Completed  Goal: Able to cope with pain  Description: Able to cope with pain  3/27/2021 0814 by Panchito Vazquez. Paxton Albert RN  Outcome: Completed  Goal: Pain level will decrease  Description: Pain level will decrease  Outcome: Completed     Problem: Tissue Perfusion - Uteroplacental, Altered:  Goal: Absence of abnormal fetal heart rate pattern  Description: Absence of abnormal fetal heart rate pattern  3/27/2021 0814 by Kathleen. Paxton Albert RN  Outcome: Completed     Problem: Urinary Retention:  Goal: Experiences of bladder distention will decrease  Description: Experiences of bladder distention will decrease  3/27/2021 0814 by Kathleen. Paxton Albert RN  Outcome: Completed  Goal: Urinary elimination within specified parameters  Description: Urinary elimination within specified parameters  3/27/2021 0814 by Kathleen. Paxton Albert RN  Outcome: Completed     Problem: Pain:  Goal: Pain level will decrease  Description: Pain level will decrease  3/27/2021 0814 by Kathleen. Paxton Albert RN  Outcome: Completed  Goal: Control of acute pain  Description: Control of acute pain  3/27/2021 0814 by Kathleen. Paxton Albert RN  Outcome: Completed  Goal: Control of chronic pain  Description: Control of chronic pain  3/27/2021 0814 by Panchito Vazquez.  Paxton Albert RN  Outcome: Completed     Problem: Discharge Planning:  Goal: Discharged to appropriate level of care  Description: Discharged to appropriate level of care  Outcome: Completed     Problem: Constipation:  Goal: Bowel elimination is within specified parameters  Description: Bowel elimination is within specified parameters  Outcome: Completed     Problem: Infection - Risk of, Puerperal Infection:  Goal: Will show no infection signs and symptoms  Description: Will show no infection signs and symptoms  Outcome: Completed     Problem: Mood - Altered:  Goal: Mood stable  Description: Mood stable  Outcome: Completed

## 2021-03-27 NOTE — FLOWSHEET NOTE
DrStovall notified of pt's increased blood pressures since this morning's assessment and pt's continued anxiety about her recovery and vaginal/perineal swelling. To start Procardia 30mg daily with continued elevated blood pressures. Also discussed pt's anxiety about removing her ribeiro cath at 12hrs, pt not wanting to take it out with worrying about having to possibly replace if not voiding after, this RN discussed with and gave emotional support to calm her anxiety about the removal. Per Darren ok to not remove at 12hrs and assess with pt and her comfort as for when to remove before end of shift.

## 2021-03-27 NOTE — PROGRESS NOTES
Has mildly elevated BP, pt has lots of anxiety, worries about a lot of things happening in labor, multiple Qs answered with each encounter. Encouraged to relax and rest during first stage of labor, so will have some energy to push the baby. Pt has no h/o CHTN, or preE during pregnancy. Don't feel that need to medicate at this point. EFM: cat 1  [place]: ctrxs q 2-3 min on 2 mu/min of pitocin.    SVE: 5/100/0, BBOW  P: continue expectant management  Electronically signed by Chirag Walsh MD on 3/26/2021 at 8:35 PM

## 2021-03-27 NOTE — FLOWSHEET NOTE
This RN and Dr. Sofia Miranda reviewed patient's contraction pattern together. Verbal orders received from Dr. Sofia Miranda to decrease pitocin to 2 milliunits at this time.

## 2021-03-27 NOTE — ANESTHESIA POSTPROCEDURE EVALUATION
Department of Anesthesiology  Postprocedure Note    Patient: Francisco Javier Zaldivar  MRN: [id number]  YOB: 1985  Date of evaluation: 3/27/2021  Time:  6:34 AM     Procedure Summary     Date: 03/26/21 Room / Location:     Anesthesia Start: 1300 Anesthesia Stop: 03/27/21 0400    Procedure: Labor Analgesia Diagnosis:     Scheduled Providers:  Responsible Provider: Bal Chatterjee MD    Anesthesia Type: epidural ASA Status: 2          Anesthesia Type: epidural    [place] Phase I:      [place] Phase II:      Last vitals: Reviewed and per EMR flowsheets.        Anesthesia Post Evaluation    Patient location during evaluation: floor  Patient participation: complete - patient participated  Level of consciousness: awake and alert  Pain score: 2  Airway patency: patent  Nausea & Vomiting: no vomiting and no nausea  Complications: no  Cardiovascular status: hemodynamically stable  Respiratory status: room air, spontaneous ventilation and acceptable  Hydration status: stable

## 2021-03-27 NOTE — FLOWSHEET NOTE
Dr. Hal Duke made aware of patient's elevated blood pressures, that she is asymptomatic and anxious at this time.  Dr. Hal Duke will be to bedside shortly for assessment

## 2021-03-27 NOTE — FLOWSHEET NOTE
Patient transferred to room 2258 via labor bed at this time with assistance from [facility]. Infant in crib transferred by FOB. Successfully transferred, patient and  oriented to room and call light.  Fresh ice water provided

## 2021-03-27 NOTE — L&D DELIVERY NOTE
easily, however episiotomy extended to 2d degree midline perineal laceration. The rest of the baby delivered without difficulty. 10 Units of Pitocin in 500 ml of LR was started IV. Placenta, cord and membranes were delivered spontaneously within less than 15 minutes. Uterus was not explored. Vaginal walls, cervix, perineum, rectum were not intact on inspection. The cervix, vagina and perineum were examined and complex second degree perineal laceration was repaired in the regular fashion with Vicryl. Two sutures were placed on the skin to approximate margins better. Uterus was well contracted. Vaginal sweep was done, laps count was correct. Mother and  left stable to recovery. 2 g of Ancef was given for the repair. Padgett catheter was also placed in the bladder due to significant perineal swelling, to allow Mom to rest for next 8 h.       Electronically signed by Eddie Melgar MD on 3/27/2021 at 3:55 AM

## 2021-03-27 NOTE — FLOWSHEET NOTE
Pitocin stopped at this time per Dr. Sis Iglesias due to contraction pattern and fetal heart rate tracing.  Dr. Sis Iglesias and this RN remain at bedside

## 2021-03-27 NOTE — FLOWSHEET NOTE
This RN phoned Charge Nurse [facility] RN regarding patient's pain and need of redose, this was relayed to [address] [place] [address] CRNA currently in [place] and will be to bedside as soon as possible.

## 2021-03-27 NOTE — FLOWSHEET NOTE
Assessment done. Pt up to the bathroom, steady on her feet. Avril care and ribeiro care done. Pt back to bed, sitting on waffle pillow for vaginal swelling discomfort. Infant sleeping in bassinet at her bedside. Pt instructed on how to order breakfast. Pt sitting up in bed, call light within reach, pt denies needing anything at this time.

## 2021-03-28 PROCEDURE — 1220000000 HC SEMI PRIVATE OB R&B

## 2021-03-28 PROCEDURE — 6370000000 HC RX 637 (ALT 250 FOR IP): Performed by: OBSTETRICS & GYNECOLOGY

## 2021-03-28 RX ORDER — BISACODYL 10 MG
10 SUPPOSITORY, RECTAL RECTAL ONCE
Status: COMPLETED | OUTPATIENT
Start: 2021-03-28 | End: 2021-03-28

## 2021-03-28 RX ORDER — HYDROCORTISONE ACETATE 25 MG/1
25 SUPPOSITORY RECTAL EVERY 12 HOURS
Qty: 14 SUPPOSITORY | Refills: 1 | Status: SHIPPED | OUTPATIENT
Start: 2021-03-28 | End: 2021-04-04

## 2021-03-28 RX ORDER — NIFEDIPINE 30 MG/1
30 TABLET, FILM COATED, EXTENDED RELEASE ORAL DAILY
Qty: 30 TABLET | Refills: 0 | Status: SHIPPED
Start: 2021-03-29 | End: 2021-09-20 | Stop reason: CLARIF

## 2021-03-28 RX ORDER — BISACODYL 10 MG
10 SUPPOSITORY, RECTAL RECTAL DAILY PRN
Status: DISCONTINUED | OUTPATIENT
Start: 2021-03-28 | End: 2021-03-28

## 2021-03-28 RX ADMIN — DOCUSATE SODIUM 100 MG: 100 CAPSULE, LIQUID FILLED ORAL at 21:54

## 2021-03-28 RX ADMIN — NIFEDIPINE 30 MG: 30 TABLET, FILM COATED, EXTENDED RELEASE ORAL at 15:35

## 2021-03-28 RX ADMIN — IBUPROFEN 800 MG: 400 TABLET, FILM COATED ORAL at 04:26

## 2021-03-28 RX ADMIN — METRONIDAZOLE 500 MG: 500 TABLET ORAL at 06:41

## 2021-03-28 RX ADMIN — METRONIDAZOLE 500 MG: 500 TABLET ORAL at 15:49

## 2021-03-28 RX ADMIN — DOCUSATE SODIUM 100 MG: 100 CAPSULE, LIQUID FILLED ORAL at 10:16

## 2021-03-28 RX ADMIN — HYDROCODONE BITARTRATE AND ACETAMINOPHEN 1 TABLET: 5; 325 TABLET ORAL at 11:06

## 2021-03-28 RX ADMIN — HYDROCODONE BITARTRATE AND ACETAMINOPHEN 1 TABLET: 5; 325 TABLET ORAL at 20:54

## 2021-03-28 RX ADMIN — IBUPROFEN 800 MG: 400 TABLET, FILM COATED ORAL at 16:31

## 2021-03-28 RX ADMIN — BISACODYL 10 MG: 10 SUPPOSITORY RECTAL at 23:04

## 2021-03-28 RX ADMIN — CEPHALEXIN 500 MG: 250 CAPSULE ORAL at 21:54

## 2021-03-28 RX ADMIN — CEPHALEXIN 500 MG: 250 CAPSULE ORAL at 06:41

## 2021-03-28 RX ADMIN — BISACODYL 10 MG: 10 SUPPOSITORY RECTAL at 20:14

## 2021-03-28 RX ADMIN — HYDROCODONE BITARTRATE AND ACETAMINOPHEN 1 TABLET: 5; 325 TABLET ORAL at 15:49

## 2021-03-28 RX ADMIN — METRONIDAZOLE 500 MG: 500 TABLET ORAL at 21:54

## 2021-03-28 RX ADMIN — HYDROCODONE BITARTRATE AND ACETAMINOPHEN 2 TABLET: 5; 325 TABLET ORAL at 04:26

## 2021-03-28 RX ADMIN — CEPHALEXIN 500 MG: 250 CAPSULE ORAL at 15:49

## 2021-03-28 ASSESSMENT — PAIN SCALES - GENERAL
PAINLEVEL_OUTOF10: 2
PAINLEVEL_OUTOF10: 4
PAINLEVEL_OUTOF10: 7

## 2021-03-28 ASSESSMENT — PAIN DESCRIPTION - LOCATION: LOCATION: PERINEUM;RECTUM

## 2021-03-28 ASSESSMENT — PAIN DESCRIPTION - PAIN TYPE: TYPE: ACUTE PAIN

## 2021-03-28 ASSESSMENT — PAIN DESCRIPTION - FREQUENCY: FREQUENCY: INTERMITTENT

## 2021-03-28 NOTE — PROGRESS NOTES
Department of Obstetrics and Gynecology  Vaginal Delivery Postpartum Rounds    SUBJECTIVE:  Pain is controlled with non-steroidal anti-inflammatory drugs or narcotic analgesics. Her lochia is normal.     OBJECTIVE:  Vital Signs: /83   Pulse 101   Temp 98.7 °F (37.1 °C) (Oral)   Resp 18   Ht 5' 2\" (1.575 m)   Wt 136 lb (61.7 kg)   Breastfeeding Unknown   BMI 24.87 kg/m²   Appearance/Psychiatric: awake, alert, cooperative, no apparent distress, appears stated age  Constitutional: The patient is well nourished. Cardiovascular: She does not have edema. Respiratory: Respiratory effort is normal.  Gastrointestinal: Soft, non tender, uterine fundus is firm below umbilicus  Extremities: nontender to palpation  Perineum: sutures intact, swelling has significantly improved, has inflamed hemorrhoid     LABS / IMAGING:      Lab Results   Component Value Date    WBC 11.5 2021    RBC 3.97 2021    HGB 12.7 2021    HCT 36.5 2021    MCV 91.9 2021    MCH 31.9 2021    MCHC 34.7 2021    RDW 12.8 2021     2021    MPV 8.9 2021     Lab Results   Component Value Date     10/11/2019    K 4.9 10/11/2019     10/11/2019    CO2 26 10/11/2019    BUN 12 10/11/2019    CREATININE <0.5 10/11/2019    GLUCOSE 84 10/11/2019    CALCIUM 10.2 10/11/2019     No results found for: POCGLU  Lab Results   Component Value Date    ALKPHOS 84 10/11/2019    ALT 19 10/11/2019    AST 27 10/11/2019    PROT 6.8 10/11/2019    BILITOT 0.6 10/11/2019    LABALBU 5.4 10/11/2019     Lab Results   Component Value Date    PHUR 7.0 2021     No results found for: LABRPR    ASSESSMENT:    Postpartum Day 1 s/p  with complex 2d degree perineal laceration   -hemorrhoids   -postpartum HTN, Procardia XL 30 mg PO daily    PLAN:   1. Continue routine postpartum care   2. Discharge home on Postpartum Day 2  3. Return to office in 1 week for BP check and external sutures removal   4. Postpartum instructions reviewed and all patient's Questions answered    Electronically signed by Roberto Allen MD on 3/28/2021 at 6:43 PM

## 2021-03-28 NOTE — PLAN OF CARE
Problem: Fluid Volume - Imbalance:  Goal: Absence of imbalanced fluid volume signs and symptoms  Description: Absence of imbalanced fluid volume signs and symptoms  3/27/2021 1849 by Faustino Salvador RN  Outcome: Completed  Goal: Absence of postpartum hemorrhage signs and symptoms  Description: Absence of postpartum hemorrhage signs and symptoms  3/27/2021 1849 by Faustino Salvador RN  Outcome: Completed     Problem: Pain - Acute:  Goal: Pain level will decrease  Description: Pain level will decrease  3/27/2021 1849 by Faustino Salvador RN  Outcome: Completed     Problem: Discharge Planning:  Goal: Discharged to appropriate level of care  Description: Discharged to appropriate level of care  3/27/2021 1849 by Faustino Salvador RN  Outcome: Completed     Problem: Constipation:  Goal: Bowel elimination is within specified parameters  Description: Bowel elimination is within specified parameters  3/27/2021 1849 by Faustino Salvador RN  Outcome: Completed     Problem: Infection - Risk of, Puerperal Infection:  Goal: Will show no infection signs and symptoms  Description: Will show no infection signs and symptoms  3/27/2021 1849 by Faustino Salvador RN  Outcome: Completed     Problem: Mood - Altered:  Goal: Mood stable  Description: Mood stable  3/27/2021 1849 by Faustino Salvador RN  Outcome: Completed     Problem: VAGINAL DELIVERY - RECOVERY AND POST PARTUM  Goal: Vital signs are medically acceptable  3/28/2021 0451 by Lorenza Mckeon RN  Outcome: Met This Shift  3/27/2021 1850 by Faustino Salvador RN  Outcome: Met This Shift  Goal: Patient will remain free of falls  3/28/2021 0451 by Lorenza Mckeon RN  Outcome: Met This Shift  3/27/2021 1850 by Faustino Salvador RN  Outcome: Met This Shift  Goal: Fundus firm at midline  3/28/2021 0451 by Lorenza Mckeon RN  Outcome: Met This Shift  3/27/2021 1850 by Faustino Salvador RN  Outcome: Met This Shift  Goal: Moderate rubra without clots, no purulent discharge, no foul smelling lochia  3/28/2021 0451 by Antonieta Dubois RN  Outcome: Met This Shift  3/27/2021 1850 by dEer Goddard RN  Outcome: Met This Shift  Goal: Empties bladder  3/28/2021 0451 by Antonieta Dubois RN  Outcome: Met This Shift  3/27/2021 1850 by Eder Goddard RN  Outcome: Ongoing  Goal: Verbalizes understanding of normal bowel function resumption  3/28/2021 0451 by Antonieta Dubois RN  Outcome: Met This Shift  3/27/2021 1850 by Eder Goddard RN  Outcome: Met This Shift  Goal: Edema will be absent or minimal  3/27/2021 1850 by Eder Goddard RN  Outcome: Met This Shift  Goal: Breasts are soft with nipple integrity intact  3/28/2021 0451 by Antonieta Dubois RN  Outcome: Met This Shift  3/27/2021 1850 by Eder Goddard RN  Outcome: Met This Shift  Goal: Demonstrates appropriate breast feeding techniques  3/28/2021 0451 by Antonieta Dubois RN  Outcome: Met This Shift  3/27/2021 1850 by Eder Goddard RN  Outcome: Met This Shift  Goal: Appropriate behavior observed  3/28/2021 0451 by Antonieta Dubois RN  Outcome: Met This Shift  3/27/2021 1850 by Eder Goddard RN  Outcome: Met This Shift  Goal: Positive Mother-Baby interactions are observed  3/28/2021 0451 by Antonieta Dubois RN  Outcome: Met This Shift  3/27/2021 1850 by Eder Goddard RN  Outcome: Met This Shift  Goal: Perineum intact without discharge or hematoma  3/28/2021 0451 by Antonieta Dubois RN  Outcome: Ongoing  3/27/2021 1850 by Eder Goddard RN  Outcome: Met This Shift  Goal: Ambulates independently  3/28/2021 0451 by Antonieta Dubois RN  Outcome: Met This Shift  3/27/2021 1850 by Eder Goddard RN  Outcome: Met This Shift     Problem: PAIN  Goal: Patient's pain/discomfort is manageable  3/28/2021 0451 by Antonieta Dubois RN  Outcome: Ongoing  3/27/2021 1850 by Carolene Apo, RN  Outcome: Met This Shift     Problem: KNOWLEDGE DEFICIT  Goal: Patient/S.O. demonstrates understanding of disease process, treatment plan, medications, and discharge instructions.   3/28/2021 0451 by Tejal Paul, RN  Outcome: Met This Shift  3/27/2021 1850 by Leonidas Roland, RN  Outcome: Met This Shift

## 2021-03-29 VITALS
BODY MASS INDEX: 25.03 KG/M2 | TEMPERATURE: 98 F | RESPIRATION RATE: 12 BRPM | HEART RATE: 94 BPM | DIASTOLIC BLOOD PRESSURE: 88 MMHG | SYSTOLIC BLOOD PRESSURE: 134 MMHG | WEIGHT: 136 LBS | HEIGHT: 62 IN

## 2021-03-29 PROCEDURE — 6370000000 HC RX 637 (ALT 250 FOR IP): Performed by: OBSTETRICS & GYNECOLOGY

## 2021-03-29 RX ORDER — SUMATRIPTAN 25 MG/1
25 TABLET, FILM COATED ORAL PRN
Status: DISCONTINUED | OUTPATIENT
Start: 2021-03-29 | End: 2021-03-29 | Stop reason: HOSPADM

## 2021-03-29 RX ADMIN — SUMATRIPTAN SUCCINATE 25 MG: 25 TABLET ORAL at 01:52

## 2021-03-29 RX ADMIN — DOCUSATE SODIUM 100 MG: 100 CAPSULE, LIQUID FILLED ORAL at 09:03

## 2021-03-29 RX ADMIN — IBUPROFEN 800 MG: 400 TABLET, FILM COATED ORAL at 09:03

## 2021-03-29 RX ADMIN — IBUPROFEN 800 MG: 400 TABLET, FILM COATED ORAL at 00:19

## 2021-03-29 RX ADMIN — SUMATRIPTAN SUCCINATE 25 MG: 25 TABLET ORAL at 09:22

## 2021-03-29 ASSESSMENT — PAIN - FUNCTIONAL ASSESSMENT
PAIN_FUNCTIONAL_ASSESSMENT: ACTIVITIES ARE NOT PREVENTED
PAIN_FUNCTIONAL_ASSESSMENT: ACTIVITIES ARE NOT PREVENTED

## 2021-03-29 ASSESSMENT — PAIN DESCRIPTION - ORIENTATION: ORIENTATION: RIGHT

## 2021-03-29 ASSESSMENT — PAIN DESCRIPTION - LOCATION
LOCATION: HEAD;PERINEUM
LOCATION: HEAD
LOCATION: HEAD
LOCATION: ABDOMEN

## 2021-03-29 ASSESSMENT — PAIN SCALES - GENERAL
PAINLEVEL_OUTOF10: 2
PAINLEVEL_OUTOF10: 2
PAINLEVEL_OUTOF10: 3

## 2021-03-29 ASSESSMENT — PAIN DESCRIPTION - PROGRESSION
CLINICAL_PROGRESSION: GRADUALLY IMPROVING
CLINICAL_PROGRESSION: GRADUALLY WORSENING
CLINICAL_PROGRESSION: GRADUALLY IMPROVING

## 2021-03-29 ASSESSMENT — PAIN DESCRIPTION - DESCRIPTORS
DESCRIPTORS: HEADACHE
DESCRIPTORS: ACHING;SORE

## 2021-03-29 ASSESSMENT — PAIN DESCRIPTION - ONSET
ONSET: GRADUAL

## 2021-03-29 ASSESSMENT — PAIN DESCRIPTION - PAIN TYPE
TYPE: ACUTE PAIN

## 2021-03-29 NOTE — PLAN OF CARE
Problem: VAGINAL DELIVERY - RECOVERY AND POST PARTUM  Goal: Vital signs are medically acceptable  3/29/2021 1131 by Madonna Joiner RN  Outcome: Completed  Note: /88   Pulse 94   Temp 98 °F (36.7 °C) (Oral)   Resp 12   Ht 5' 2\" (1.575 m)   Wt 136 lb (61.7 kg)   Breastfeeding Unknown   BMI 24.87 kg/m²    3/29/2021 0345 by Alina Leblanc RN  Outcome: Met This Shift  Goal: Patient will remain free of falls  3/29/2021 0345 by Alina Leblanc RN  Outcome: Completed  Goal: Fundus firm at midline  3/29/2021 1131 by Madonna Joiner RN  Outcome: Completed  3/29/2021 0345 by Alina Leblanc RN  Outcome: Met This Shift  Goal: Moderate rubra without clots, no purulent discharge, no foul smelling lochia  3/29/2021 1131 by Madonna Joiner RN  Outcome: Completed  3/29/2021 0345 by Alina Leblanc RN  Outcome: Met This Shift  Goal: Empties bladder  3/29/2021 0345 by Alina Leblanc RN  Outcome: Completed  Goal: Verbalizes understanding of normal bowel function resumption  3/29/2021 1131 by Madonna Joiner RN  Outcome: Completed  3/29/2021 0345 by Alina Leblanc RN  Outcome: Met This Shift  Goal: Edema will be absent or minimal  3/29/2021 1131 by Madonna Joiner RN  Outcome: Completed  3/29/2021 0345 by Alina Leblanc RN  Outcome: Ongoing  Goal: Breasts are soft with nipple integrity intact  3/29/2021 1131 by Madonna Joiner RN  Outcome: Completed  3/29/2021 0345 by Alina Leblanc RN  Outcome: Met This Shift  Goal: Demonstrates appropriate breast feeding techniques  3/29/2021 1131 by Madonna Joiner RN  Outcome: Completed  3/29/2021 0345 by Alina Leblanc RN  Outcome: Met This Shift  Goal: Appropriate behavior observed  3/29/2021 1131 by Madonna Joiner RN  Outcome: Completed  3/29/2021 0345 by Alina Leblanc RN  Outcome: Met This Shift  Goal: Positive Mother-Baby interactions are observed  3/29/2021 1131 by Madonna Joiner RN  Outcome: Completed  3/29/2021 0345 by Alina Leblanc RN  Outcome: Met This Shift  Goal: Perineum intact without discharge or hematoma  3/29/2021 1131 by Burgess Ryan RN  Outcome: Completed  3/29/2021 0345 by Stan Luo RN  Outcome: Met This Shift  Goal: Ambulates independently  3/29/2021 1131 by Burgess Ryan RN  Outcome: Completed  3/29/2021 0345 by Stan Luo RN  Outcome: Met This Shift     Problem: PAIN  Goal: Patient's pain/discomfort is manageable  3/29/2021 1131 by Burgess Ryan RN  Outcome: Completed  3/29/2021 0345 by Stan Luo RN  Outcome: Met This Shift     Problem: KNOWLEDGE DEFICIT  Goal: Patient/S.O. demonstrates understanding of disease process, treatment plan, medications, and discharge instructions.   3/29/2021 1131 by Burgess Ryan RN  Outcome: Completed  3/29/2021 0345 by Stan Luo RN  Outcome: Met This Shift

## 2021-03-29 NOTE — FLOWSHEET NOTE
Mom up to get a shower. Linens changed. Teaching reinforced to do pericare after all urinations and stools to help prevent an infection. Mom encouraged to use tucks pads/dermaplast on swollen perineum and on hemorroids. Mom also encouraged to use ice packs. Mom reeducated on a high fiber diet to help w/ passing of stool. Mom also encouraged not to strain if having a bowel movement.

## 2021-03-29 NOTE — FLOWSHEET NOTE
Patient awake and alert walking around room. VSS. Patient complained of bad headache and immitrex prn dose ordered and given. Lactation notified and at bedside to observe a feed. Name and number on white board.

## 2021-03-29 NOTE — PLAN OF CARE
Problem: VAGINAL DELIVERY - RECOVERY AND POST PARTUM  Goal: Vital signs are medically acceptable  3/29/2021 0345 by Citlaly Mratinez RN  Outcome: Met This Shift  3/28/2021 1959 by Brijesh Reddy RN  Outcome: Ongoing  Goal: Fundus firm at midline  3/29/2021 0345 by Citlaly Martinez RN  Outcome: Met This Shift  3/28/2021 1959 by Brijesh Reddy RN  Outcome: Ongoing  Goal: Moderate rubra without clots, no purulent discharge, no foul smelling lochia  3/29/2021 0345 by Citlaly Martinez RN  Outcome: Met This Shift  3/28/2021 1959 by Birjesh Reddy RN  Outcome: Ongoing  Goal: Verbalizes understanding of normal bowel function resumption  3/29/2021 0345 by Citlaly Martinez RN  Outcome: Met This Shift  3/28/2021 1959 by Brijesh Reddy RN  Outcome: Ongoing  Goal: Breasts are soft with nipple integrity intact  3/29/2021 0345 by Citlaly Martinez RN  Outcome: Met This Shift  3/28/2021 1959 by Brijesh Reddy RN  Outcome: Ongoing  Goal: Demonstrates appropriate breast feeding techniques  3/29/2021 0345 by Citlaly Martinez RN  Outcome: Met This Shift  3/28/2021 1959 by Brijesh Reddy RN  Outcome: Ongoing  Goal: Appropriate behavior observed  3/29/2021 0345 by Citlaly Martinez RN  Outcome: Met This Shift  3/28/2021 1959 by Brijesh Reddy RN  Outcome: Ongoing  Goal: Positive Mother-Baby interactions are observed  3/29/2021 0345 by Citlaly Martinez RN  Outcome: Met This Shift  3/28/2021 1959 by Brijesh Reddy RN  Outcome: Ongoing  Goal: Perineum intact without discharge or hematoma  3/29/2021 0345 by Citlaly Martinez RN  Outcome: Met This Shift  3/28/2021 1959 by Brijesh Reddy RN  Outcome: Ongoing  Goal: Ambulates independently  3/29/2021 0345 by Citlaly Martinez RN  Outcome: Met This Shift  3/28/2021 1959 by Brijesh Reddy RN  Outcome: Ongoing     Problem: PAIN  Goal: Patient's pain/discomfort is manageable  3/29/2021 0345 by Citlaly Martinez, RN  Outcome: Met This Shift  3/28/2021 1959 by Brijesh Reddy RN  Outcome: Ongoing     Problem: KNOWLEDGE DEFICIT  Goal: Patient/S.O. demonstrates understanding of disease process, treatment plan, medications, and discharge instructions.   3/29/2021 0345 by Preston Crowder RN  Outcome: Met This Shift  3/28/2021 1959 by Tamra Coffey RN  Outcome: Ongoing     Problem: VAGINAL DELIVERY - RECOVERY AND POST PARTUM  Goal: Edema will be absent or minimal  3/29/2021 0345 by Preston Crowder RN  Outcome: Ongoing  3/28/2021 1959 by Tamra Coffey RN  Outcome: Ongoing     Problem: VAGINAL DELIVERY - RECOVERY AND POST PARTUM  Goal: Patient will remain free of falls  3/29/2021 0345 by Preston Crowder RN  Outcome: Completed  3/28/2021 1959 by Tamra Coffey RN  Outcome: Ongoing  Goal: Empties bladder  3/29/2021 0345 by Preston Crowder RN  Outcome: Completed  3/28/2021 1959 by Tamra Coffey RN  Outcome: Ongoing

## 2021-03-29 NOTE — FLOWSHEET NOTE

## 2021-03-29 NOTE — FLOWSHEET NOTE
C/o migraine headache that she states she has had all day. Unrelieved by motrin or Norco. Dr. Anjel Trevizo called and imitrex ordered. (Pt takes at home).

## 2021-03-29 NOTE — DISCHARGE SUMMARY
the patient's :   Erasto Solorzano [[id number]]   Birth Weight: 6 lb 10.2 oz (3.01 kg)     Home with mother    Electronically signed by Chirag Walsh MD on 3/28/2021 at 10:23 PM

## 2021-04-05 RX ORDER — SUMATRIPTAN 100 MG/1
TABLET, FILM COATED ORAL
Qty: 8 TABLET | Refills: 7 | Status: SHIPPED | OUTPATIENT
Start: 2021-04-05 | End: 2021-04-06

## 2021-06-11 ENCOUNTER — HOSPITAL ENCOUNTER (OUTPATIENT)
Dept: PHYSICAL THERAPY | Age: 36
Setting detail: THERAPIES SERIES
Discharge: HOME | End: 2021-06-11
Payer: COMMERCIAL

## 2021-06-11 PROCEDURE — 97110 THERAPEUTIC EXERCISES: CPT | Performed by: PHYSICAL THERAPIST

## 2021-06-11 PROCEDURE — 97163 PT EVAL HIGH COMPLEX 45 MIN: CPT | Performed by: PHYSICAL THERAPIST

## 2021-06-11 PROCEDURE — 97530 THERAPEUTIC ACTIVITIES: CPT | Performed by: PHYSICAL THERAPIST

## 2021-06-11 NOTE — PROGRESS NOTES
[address] [facility] [place]  Phone: [phone]  Fax: [phone]                                                      Physical Therapy Certification    Dear Referring Practitioner: Mecca Lackey MD,    We had the pleasure of evaluating the following patient for physical therapy services at [place] [address]. A summary of our findings can be found in the initial assessment below. This includes our plan of care. If you have any questions or concerns regarding these findings, please do not hesitate to contact me at the office phone number checked above. Thank you for the referral.       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 Latex Allergy:  [x]NO      []YES     Preferred Language for Healthcare:   [x]English       []Other:    C-SSRS Triggered by Intake questionnaire (Past 2 wk assessment ):   [x] No, Questionnaire did not trigger screening.   [] Yes, Patient intake triggered C-SSRS Screening     [] Completed, no further action required.    [] Completed, PCP notified via Epic    Functional Outcome:   PFDI-20: 44.8  Sub-sections: POPDI-6 Score 0; CRADI-8 Score: 28.13; BABAK-6 Score: 16.67    Female Sexual Function Index (FSFI) NT/36 - pain with vaginal penetration and unable to complete intercourse, admits to both anxiety with muscle guarding and actual pain with attempts x 2    SUBJECTIVE: Patient stated complaint:scar tissue has built up from episiotomy from vaginal delivery in late March 2021. Reports migraines that were cyclic prior to pregnancy and now on a weekly basis - does not like way medications make the rest of body feel achy and tight. Reports significant anxiety. Patient reports pain with attempts of vaginal penetration x 2 since childbirth and unable to complete intercourse, admits to both anxiety with muscle guarding and actual pain. Prior to pregnancy reports occasional discomfort with vaginal penetration, thought perhaps due to a \"tilted uterus. \"   Pregnancies: [x] No    [] Yes, if yes #  Deliveries: # and type:     4 week or greater of failed trial of PFPT program?   [x] No    [] Yes    PFPT program as defined by \"Completing 4 weeks of an ordered plan of pelvic muscle exercises designed to increase periurethral muscle strength\". Past Medical History:   Diagnosis Date    Chiari I malformation ([facility] [address]     repaired    Gastric ulcer     ? from advil    Vocal cord nodules     chronic hoarseness/ voice training no help     Relevant Medical History:Vaginal delivery 3/27/2021 with mediolateral episiotomy extend to 2nd degree perineal laceration. History of general anxiety, including while trying to conceive - took medications and talked to psychologist several months prior to conception, but discontinued anxiety medications once pregnant. Remote decompression of cervical spine due to Chiari and with increased complaints of neck stiffness, especially with positioning during breastfeeding.     Per Dr. Citlalli Baum - Healed well, but has some tenderness at posterior fourchette and is very anxious about it, discussed to give it 2-3 more weeks to allow scar tissue maturation, start massaging perineum with KY jelly or Vaseline daily, to improve elastiscity, may need change position during intercourse. :If within 6 months still has issue, may need a small revision under local opening post fourchette. Discussed that had a very complex tear during delivery. Pain Scale: 4-5/10, worse when took medication for migraine and muscles were more tense, otherwise no pain at rest unless sitting on hard surface  Easing factors: changing positions  Provocative factors: vaginal penetration, sitting on hard surfaces, anxiety  Type: []Constant   [x]Intermittent  []Radiating [x]Localized []other:    Numbness/Tingling: occasionally in arms generally with anxiety    Severity of problem: 4-5/10     Occupation/School: going back to part-time work mid July,  - \"5423\"    Living Status/Prior Level of Function: Prior to this injury / incident, pt was independent with ADLs and IADLs, currently breastfeeding - hopes to continue until baby is 2-11 months old and wants to try to have another baby soon.      OBJECTIVE:  Ortho Screen:  Posture - moderately rounded shoulders, forward head, anterior pelvic tilt, decreased lumbar lordosis  Other Observation - scar in posterior neck/base of skull from Chiari  Palpation  Alignment    ROM LEFT RIGHT Comments   Cervical Side Bend   [facility] [place]   [facility]   Shoulder Flex   [facility]   Shoulder Abd      Shoulder ER      Shoulder IR            Lumbar Side Bend   WFL - increased tension in L hip flexors with bend to R   Lumbar Rotation   WFL   Hip Flexion   WFL   Hip Abd      Hip ER   [facility]   Hip IR   WFL   Hip Extension   Limited by tight hip flexors   Knee Ext   WFL   Knee Flex   WFL         Hamstring Flex   Mild to mod limitations BLEs   Piriformis   Mild to mod limitations BLEs - > limitations/tightness on L compared to R                   Strength LEFT RIGHT Comments   Shoulder flexion   [facility]   Shoulder scaption      Shoulder ER      Shoulder IR      Biceps   WFL   Triceps   WFL      WFL         Multifidus Transverse Ab   WFL   Hip Flexors   WFL   Hip Abductors   WFL   Hip Extensors      Hip Internal Rotators      Hip External Rotators        TA Muscle Contraction Scale    Criteria                                               Score  Quality of Contraction   Not Present      [] 0   Rapid, Superificial     [] 1   Just Perceptible     [] 2     Gentle, Slow                [x] 3    Substitution   Resting       [] 0   Moderate to Strong                           [] 1    Subtle Perceptible     [x] 2   None                  [] 3    Symmetry of Contraction   Unilateral                  [] 0   Bilateral/Asymmetrical                [] 1   Symmetrical                  [x] 2    Breathing     Inability/Difficulty Breathing during contraction                [] 0   Able to hold contraction while Breathing             [x] 1    Holding   Able to Hold Contraction <10 s                         [] 0   Able to Hold Contraction >10 s               [x] 1      9/10  Adapted from [employer], Copyright 2009      Abdominals:  Scarring:   [x] No    [] Yes  Diastasis:   [x] No    [] Yes  Functional stability:   [] No    [x] Yes    Pelvic Floor:  Observation  Skin condition - intact  Scarring - noted in superficial tissue posterolateral towards R side  Perineal descent - limited excursion noted  External clock - tenderness with increased tension in posterior superficial perineal muscles  Contraction voluntary/reflexive - WFL but with increased accessory muscles and substitution noted   Relaxation voluntary/bear down - tends to reflexively contract with attempts to bear down    Internal Assessment  Introitus - impeded anteriorly, more painful with posteriorly directed insertion due to scar and sensitivity  Vaginal vault - intact  Internal clock - moderate tightness/tension and tenderness on L lateral and posterior muscles deep, superficially pain with sustained pressure directly posteriorly (6 o'clock position) and 4 o'clock position, less so in 8 o'clock position  Prolapse Test  Quality of contraction - NT  Coordination - difficulty with bearing down as tended to reflexively tighten, improved relaxation when coordinated with deep breathing techniques    PERF score - NT - deferred due to pain and upregulation/tightness, focused on stretching and downtraining  Power  Endurance  Repetitions  Quick Flicks                         [x] Patient history, allergies, meds reviewed. Medical chart reviewed. See intake form. Review Of Systems (ROS):  [x]Performed Review of systems (Integumentary, CardioPulmonary, Neurological) by intake and observation. Intake form has been scanned into medical record. Patient has been instructed to contact their primary care physician regarding ROS issues if not already being addressed at this time.       Co-morbidities/Complexities (which will affect course of rehabilitation):   []None        []Hx of COVID   Arthritic conditions   []Rheumatoid arthritis (M05.9)  []Osteoarthritis (M19.91)  []Gout   Cardiovascular conditions   []Hypertension (I10)  []Hyperlipidemia (E78.5)  []Angina pectoris (I20)  []Atherosclerosis (I70)  []Pacemaker  []Hx of CABG/stent/  cardiac surgeries   Musculoskeletal conditions   []Disc pathology   []Congenital spine pathologies   []Osteoporosis (M81.8)  []Osteopenia (M85.8)  []Scoliosis       Endocrine conditions   []Hypothyroid (E03.9)  []Hyperthyroid Gastrointestinal conditions   []Constipation (J39.03)   Metabolic conditions   []Morbid obesity (E66.01)  []Diabetes type 1(E10.65) or 2 (E11.65)   []Neuropathy (G60.9)     Cardio/Pulmonary conditions   []Asthma (J45)  []Coughing   []COPD (J44.9)  []CHF  []A-fib   Psychological Disorders  [x]Anxiety (F41.9)  []Depression (F32.9)   []Other:   Developmental Disorders  []Autism (F84.0)  []CP (G80)  []Down Syndrome (Q90.9)  []Developmental delay     Neurological conditions  []Prior Stroke (I69.30)  []Parkinson's (G20)  []Encephalopathy (G93.40)  []MS (G35)  []Post-polio (G14)  []SCI  []TBI  []ALS   Other conditions  []Fibromyalgia (M79.7)  []Vertigo  []Syncope  []Kidney Failure  []Cancer      []currently undergoing                treatment  []Pregnancy  []Incontinence   Prior surgeries  []involved limb  []previous spinal surgery  [] section birth  []hysterectomy  []bowel / bladder surgery  []other relevant surgeries   []Other:     Chiari malformation with scar tissue in posterior neck at base of skull, gastric ulcer, back surgery 2006, bowel trouble 2008, constipation and hemorrhoids          Barriers to/and or personal factors that will affect rehab potential:              []Age  []Sex   []Smoker              []Motivation/Lack of Motivation                        [x]Co-Morbidities              []Cognitive Function, education/learning barriers              []Environmental, home barriers              []profession/work barriers  []past PT/medical experience  []other:  Justification: motivated and interested in learning techniques to address current issue with scar tissue and future preparation for anticipated future childbirth    Falls Risk Assessment (30 days):   [x] Falls Risk assessed and no intervention required. [] Falls Risk assessed and Patient requires intervention due to being higher risk   TUG score (>12s at risk):     [] Falls education provided, including         ASSESSMENT:    Patient presents to PF PT with complaints of pain with attempted vaginal penetration due to scar tissue in posterior fourchette region since complex vaginal delivery on 3/27/2021. Patient also complains of generalized tightness in bilateral hips, L >R and has limited ROM in her lower back and neck, and has generalized anxiety which likely in a contributing factor to her generally tight PF muscles.  Patient is fearful that if her tissue does not heal, she will be delayed in her efforts to have subsequent children, which given her age she wishes to pursue additional hypomobility  []Noted lumbosacral and/or generalized hypermobility  [x]Decreased core/proximal hip strength and neuromuscular control   []Decreased LE functional strength  [x]pelvic/sacral/spinal malalignment   [x]Increased pain with penetration  []Absent/poor control of PF contraction   [x]Absent/poor control of PF relaxation  []Decreased control of bladder  []Decreased control of bowel    Functional Activity Limitations (from functional questionnaire and intake)  [x]Reduced ability to maintain good posture and demonstrate good body mechanics with sitting, bending, and lifting  [x]Reduced ability to perform lifting, reaching, carrying tasks  []Reduced ability to control urine  []Reduced ability to control bowel movements   []Reduced ability to ambulate prolonged functional periods/distances/surfaces  []Reduced ability to squat   []Reduced ability to forward bend  []Reduced ability to ascend/descend stairs  [x]Reduced ability to tolerate penetration/intercourse    Participation Restrictions  []Reduced participation in self care activities  [x]Reduced participation in home management activities  [x]Reduced participation in work activities  [x]Reduced participation in social activities  []Reduced participation in sport/recreational activities    Classification/Subgrouping:  []signs/symptoms consistent vaginismus/dyspareunia    []signs/symptoms consistent with pelvic floor organ prolapse  []signs/symptoms consistent with stress urinary incontinence  []signs/symptoms consistent with urge urinary incontinence  []signs/symptoms consistent with bowel incontinence  [x]signs/symptoms consistent with post-surgical status including decreased ROM, strength and function  [x]signs/symptoms consistent with other: limited tissue mobility due to scarring after vaginal delivery with episiotomy and tearing      Prognosis/Rehab Potential:      []Excellent   [x]Good    []Fair   []Poor    Tolerance of evaluation/treatment: []Excellent   [x]Good    []Fair   []Poor    Physical Therapy Evaluation Complexity Justification  [x] A history of present problem with:  [] no personal factors and/or comorbidities that impact the plan of care;  []1-2 personal factors and/or comorbidities that impact the plan of care  [x]3 personal factors and/or comorbidities that impact the plan of care  [x] An examination of body systems using standardized tests and measures addressing any of the following: body structures and functions (impairments), activity limitations, and/or participation restrictions;:  [] a total of 1-2 or more elements   [] a total of 3 or more elements   [x] a total of 4 or more elements   [x] A clinical presentation with:  [] stable and/or uncomplicated characteristics   [x] evolving clinical presentation with changing characteristics  [] unstable and unpredictable characteristics;   [x] Clinical decision making of [] low, [] moderate, [x] high complexity using standardized patient assessment instrument and/or measurable assessment of functional outcome. [] EVAL (LOW) 24622 (typically 20 minutes face-to-face)  [] EVAL (MOD) 55587 (typically 30 minutes face-to-face)  [x] EVAL (HIGH) 47589 (typically 45 minutes face-to-face)  [] RE-EVAL       PLAN:   Frequency/Duration:     Recommend see patient every 1-2 weeks initially to perform tranvaginal PF muscle assessment and intervention as deemed necessary then to ensure patient is performing exercises for pelvic floor, LE and core stability correctly, then taper as appropriate, determining frequency of treatments based on progress, for a total of up to 8-10 sessions.  Next scheduled appointment is on 6/22 at 3PM.    Interventions:  [x]  Therapeutic exercise including: strength training, ROM, and functional mobility for joint, spine, core, and pelvic floor   [x]  NMR activation and proprioception for abdominals, pelvic floor musculature activation and coordination, and posture retraining techniques for perineal stretching, beginning with sustained pressure/stretch and progressing to sweeping motions as tolerated. Reviewed need to improve flexibility of LEs since LE muscles attach to pelvis and contribute to tightness noted in PF muscles. Reviewed need for overall downtraining/downregulation of nervous system through various techniques which will help reduce overall anxiety/stress and more specifically decrease tension/tightness in PF muscles. GOALS:  Patient stated goal: reduce the amount of scar tissue to prevent a procedure to retear and stitch back up  [] Progressing: [] Met: [] Not Met: [] Adjusted      Therapist goals for Patient:     Short Term Goals: To be achieved in: 4-5 sessions  1. Patient will have a decrease in pain during attempts with vaginal penetration to indicate improvement in pelvic floor flexibility, relaxation, and muscle coordination. [] Progressing: [] Met: [] Not Met: [] Adjusted  2. Perform HEP for pelvic floor and core muscle groups with minimal direction from therapist as she progresses to become more independent managing her pelvic pressure and PF muscle weakness. [] Progressing: [] Met: [] Not Met: [] Adjusted  3. Report using 1-2 behavioral modification strategies to reduce urinary/bowel complaints through dietary and mechanical changes, with focus on full emptying of bladder with each urination and using optimal positioning and deep breathing for relaxation of posterior PF muscles during defacation, reducing need to strain. Long Term Goals: To be achieved in: 8-10 sessions    1. Improve score of quality of life index measure, PFDI-20, to 30 or less (initial eval - 44.8) disability index demonstrating improved quality of life with less life interruptions due to pelvic pressure/perineal pain allowing patient to fully participate in social activities, including vaginal penetration for intercourse and gynecological examinations.   [] Progressing: [] Met: [] Not Met: [] Adjusted  2. Patient will report ability to tolerate penetration without increase in pain to progress towards intercourse / examinations without pain or limitation. [] Progressing: [] Met: [] Not Met: [] Adjusted  3. Patient will return to functional activities including breastfeeding and caring for her baby without increased symptoms or restriction. [] Progressing: [] Met: [] Not Met: [] Adjusted  7. Report using 3-4 behavioral modification strategies to reduce urinary/bowel complaints through dietary and mechanical changes, with focus on full emptying of bladder with each urination and using optimal positioning and deep breathing for relaxation of posterior PF muscles during defacation, reducing need to strain. [] Progressing: [] Met: [] Not Met: [] Adjusted   8. Rate severity of the problem related to pelvic pain to 2-3 or less on scale of 0-10 with 0 being no problem and 10 being the worst - (4-5 reported at intial eval). [] Progressing: [] Met: [] Not Met: [] Adjusted   9. Perform HEP for pelvic floor and core muscle groups independently as she progresses to self-manage her pelvic pressure and PF muscle weakness.   [] Progressing: [] Met: [] Not Met: [] Adjusted     Electronically signed by:  Mary Barrera, PT #[id number]    Time in: 1000  Time out: 1200   Coded treatment time: 120 minutes

## 2021-06-15 ENCOUNTER — APPOINTMENT (OUTPATIENT)
Dept: PHYSICAL THERAPY | Age: 36
End: 2021-06-15
Payer: COMMERCIAL

## 2021-06-22 ENCOUNTER — HOSPITAL ENCOUNTER (OUTPATIENT)
Dept: PHYSICAL THERAPY | Age: 36
Setting detail: THERAPIES SERIES
Discharge: HOME | End: 2021-06-22
Payer: COMMERCIAL

## 2021-06-22 PROCEDURE — 97110 THERAPEUTIC EXERCISES: CPT | Performed by: PHYSICAL THERAPIST

## 2021-06-22 PROCEDURE — 97530 THERAPEUTIC ACTIVITIES: CPT | Performed by: PHYSICAL THERAPIST

## 2021-06-22 NOTE — PROGRESS NOTES
[address] [facility] [place]  Phone: [phone]  Fax: [phone]        Physical Therapy Daily Treatment Note    Date: 2021    Patient Name: Royal Seymour : 1985 MRN: [id number]    Restrictions/Precautions:    Medical/Treatment Diagnosis Information:    · Diagnosis: disruption of perineal OF wound (D61.4)    Insurance/Certification information: PT Insurance Information: [facility]    Physician Information: Referring Practitioner: Lashell Chiu MD    Plan of care signed (Y/N): No, will resend to Dr. Maria E Bermudez    Visit# / total visits: 2/10      Time in: 1515 Timed Treatment: 1630 Total Treatment Time: 75 minutes    ________________________________________________________________________________________    Pain Level: 4-5/10 with vaginal penetration, no specific complaint of pain at rest    SUBJECTIVE: Patient reports performing stretching exercises about every other day. Has not attempted using dilator, but using finger for stretching of introitus and surrounding superficial muscles of PF. Able to initiate intercourse twice since initial eval, but unable to complete - able to tolerate only a 1/4 penetration with first attempt and 1/2 pentration the second attempt. Patient reports still breastfeeding, but planning stop when baby is 2-6 months old so that she can begin preparing for another pregnancy.      OBJECTIVE:  HEP instruction: Written HEP instructions provided and reviewed     Diaphragmatic breathing - coordinating with pelvic floor muscle activities - tactile cues on stomach - cues for PF descent with inhalation and elevation with exhalation duirng attempts for PF contraction     Piriformis stretch - figure four supine and then sitting - hold 8-10 breaths/~1 minute x 2 reps each LE - increased tightness noted in L LE compared to R - reviewed     Hip adductor stretch - supine - holding for 5+ minutes during manual soft Emphasized need for continued stretching of LE muscle attached to pelvis. ASSESSMENT:    Patient with increased tolerance for stretching and manual techniques for posterior introitus as appears to have slight desensitization with self-massage and stretching at home over past week or so. After additional assessment of PF and surrounding pelvic muscles, noted superficial PF muscles including transverse perineal and external anal sphincter to be significantly tight and tender to palpation which may be contributing to patient's inability to tolerate full vaginal penetration. Continued to note scar tissue with decreased mobility and increased sensitivity at posterior introitus/perineal body. Patient would definitely benefit from pelvic floor physical therapy for continued education on healthy bladder/bowel habits and behavioral modifications as well as advanced activities to improve tissue mobility of superficial and deeper PF muscle structures to allow patient to better tolerate vaginal penetration for intercourse and gynecological assessments. In addition, will continue to address control/ coordination and endurance of pelvic floor, core and hip muscles to improve general mobility and improve ability to prepare for additional pregnancies and deliveries in her future. Treatment/Activity Tolerance:    Patient tolerated treatment well   Patient limited by pain       GOALS:  Patient stated goal: reduce the amount of scar tissue to prevent a procedure to retear and stitch back up  [x]? Progressing: []? Met: []? Not Met: []? Adjusted        Therapist goals for Patient:      Short Term Goals: To be achieved in: 4-5 sessions  1. Patient will have a decrease in pain during attempts with vaginal penetration to indicate improvement in pelvic floor flexibility, relaxation, and muscle coordination. []? Progressing: []? Met: []? Not Met: []? Adjusted  2.  Perform HEP for pelvic floor and core muscle groups with minimal

## 2021-06-29 ENCOUNTER — HOSPITAL ENCOUNTER (OUTPATIENT)
Dept: PHYSICAL THERAPY | Age: 36
Setting detail: THERAPIES SERIES
Discharge: HOME | End: 2021-06-29
Payer: COMMERCIAL

## 2021-06-29 PROCEDURE — 97110 THERAPEUTIC EXERCISES: CPT | Performed by: PHYSICAL THERAPIST

## 2021-06-29 PROCEDURE — 97530 THERAPEUTIC ACTIVITIES: CPT | Performed by: PHYSICAL THERAPIST

## 2021-06-29 NOTE — PROGRESS NOTES
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 Josh Leonard and will place call to office to obtain signature. Visit# / total visits: 3/10    Time in: 1510 Timed Treatment: 1625 Total Treatment Time: 75 minutes    ________________________________________________________________________________________    Pain Level:10 /10 during initial penetration and during extraction due to superficial irritation/pain,  Mildly painful when sitting on more firm surfaces such as her leather car seat compared to the softer bed or couch    SUBJECTIVE: Patient reports tolerating vaginal penetration, able to tolerate full penetration but with significant pain that lasted the rest of the evening, but was much better the next morning. Did perform so massage with her partner prior to penetration but felt that she tensed up during penetration, randolph with deeper penetration. Switched daughter into crib, so more stressful making adjustments and unable to perform exercises as frequently as she would have liked to this past week.     OBJECTIVE:    Treatment Interventions Implemented     HEP instruction: Written HEP instructions provided and reviewed   Diaphragmatic breathing - coordinating with pelvic floor muscle activities - tactile cues on stomach - cues for PF descent with inhalation and elevation with exhalation duirng attempts for PF contraction     Piriformis stretch - figure four supine and then massage to R piriformis and obturator internus with moderate tightness noted, but most severe tenderness note just lateral to external anal sphincter. Patient verbally consented to transrectal stretch of external anal sphincter with tightness and tenderness noted to R side near junction of external anal sphincter and transverse perineal muscles. Education  Reviewed need for continued desensitization of perineum, specifically superficial muscles and tissues around scar from vaginal childbirth. Emphasized need for continued stretching of LE muscle attached to pelvis and more direct desensitization and gentle stretch of PF muscles using towel/washcloth roll.         ASSESSMENT:    Patient reportedly able to tolerate full penile penetration for intercourse this past week, but with extreme pain and discomfort, 10/10 during intercourse. Patient continues with increased tolerance for stretching and manual techniques for posterior introitus as appears to have slight desensitization with self-massage and stretching at home. Superficial PF muscles including transverse perineal and external anal sphincter especially on R continue to be significantly tight and tender to palpation which is likely contributing to patient's inability to tolerate full vaginal penetration. Continued to note scar tissue with decreased mobility and increased sensitivity at posterior introitus/perineal body. Continued to encourage stretches for LE muscles as well as more direct stretches to and desensitization techniques for PF muscles. Continue to educate in generalized down regulation of her nervous system while addressing tissue specific tightness/tension which is gradually improving with transvaginal and transrectal manual techniques.     Patient would definitely benefit from pelvic floor physical therapy for continued education and activities to improve tissue mobility of superficial and deeper PF muscle structures to allow patient to better tolerate vaginal penetration for intercourse and gynecological assessments.  In addition, will continue to address control/ coordination and endurance of pelvic floor, core and hip muscles to improve general mobility and improve ability to prepare for additional pregnancies and deliveries in her future. Treatment/Activity Tolerance:    Patient tolerated treatment well   Good body awareness and able to delineate painful responses. Continue to encourage gentle touch for desensitization and recognizing touch as stimuli other than pain. GOALS:  Patient stated goal: reduce the amount of scar tissue to prevent a procedure to retear and stitch back up  [x]? ? Progressing: []?? Met: []?? Not Met: []?? Adjusted        Therapist goals for Patient:      Short Term Goals: To be achieved in: 4-5 sessions  1. Patient will have a decrease in pain during attempts with vaginal penetration to indicate improvement in pelvic floor flexibility, relaxation, and muscle coordination.  []?? Progressing: []?? Met: []?? Not Met: []?? Adjusted  2. Perform HEP for pelvic floor and core muscle groups with minimal direction from therapist as she progresses to become more independent managing her pelvic pressure and PF muscle weakness. []?? Progressing: []?? Met: []?? Not Met: []?? Adjusted  3.  Report using 1-2 behavioral modification strategies to reduce urinary/bowel complaints through dietary and mechanical changes, with focus on full emptying of bladder with each urination and using optimal positioning and deep breathing for relaxation of posterior PF muscles during defacation, reducing need to strain.        Plan: Continue per plan of care   Recommend see patient every 1-2 weeks initially to perform tranvaginal PF muscle assessment and intervention as deemed necessary then to ensure patient is performing exercises for pelvic floor, LE and core stability correctly, then taper as appropriate, determining frequency of treatments based on progress, for a total of up to 8-10+ sessions. Next scheduled appointment is on 7/6 at 10AM.     Again, resent initial eval and treatment note from 6/22 to Dr. Ivan Barba for signing off on plan of care. Per , will send hard fax to the attention of Chana Church at Dr. Betsy Whitney office to obtain a signature in agreement with plan of care.      Electronically signed by Claudy Vallecillo, PT #[id number] on 6/29/2021 at 4:44 PM                     Electronically signed by Claudy Vallecillo, PT #[id number] on 6/29/2021 at 9:13 AM

## 2021-07-06 ENCOUNTER — HOSPITAL ENCOUNTER (OUTPATIENT)
Dept: PHYSICAL THERAPY | Age: 36
Setting detail: THERAPIES SERIES
Discharge: HOME | End: 2021-07-06
Payer: COMMERCIAL

## 2021-07-06 PROCEDURE — 97110 THERAPEUTIC EXERCISES: CPT | Performed by: PHYSICAL THERAPIST

## 2021-07-06 PROCEDURE — 97530 THERAPEUTIC ACTIVITIES: CPT | Performed by: PHYSICAL THERAPIST

## 2021-07-06 PROCEDURE — 97140 MANUAL THERAPY 1/> REGIONS: CPT | Performed by: PHYSICAL THERAPIST

## 2021-07-06 NOTE — PROGRESS NOTES
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 Luann Reese on 2021      Visit# / total visits: 4/10    Time in: 1000 Timed Treatment: 1100 Total Treatment Time: 60 minutes    ________________________________________________________________________________________    Pain Level: 7-8/10 during initial penetration and during extraction due to superficial irritation/pain    SUBJECTIVE:   Patient reports being able to exercise more regularly, sitting on towel roll which seems to be helping. Reports she is very anxious about returning to work in 2 weeks - transitioning to a new role at work and uncertain of her schedule and how the baby will do with the transition. Patient reports tolerating vaginal penetration, able to tolerate full penetration but continues with significant pain, but slightly less, that lasted the rest of the evening, but was much better the next morning. Did perform so massage with her partner prior to penetration but felt that she tensed up during penetration, randolph with deeper penetration.       OBJECTIVE:    Treatment Interventions Implemented     HEP instruction: Written HEP instructions provided and reviewed   Diaphragmatic breathing - coordinating with pelvic floor muscle activities - tactile cues on stomach - cues for PF descent with inhalation and elevation with exhalation duirng attempts for PF contraction     Piriformis stretch - figure four supine and then sitting - hold 8-10 breaths/~1 minute x 2 reps each LE - increased tightness noted in L LE compared to R - reviewed - mostly performing during yoga     Hip adductor stretch - supine - holding for 5+ minutes during manual soft tissue intervention to hip adductors - advised to hold stretch for 3-5 minutes, 1-2 times/day - reviewed   Deep squat - hold for 2 minutes for perineum and hip add stretch   Yoga for PF reaxation - inverted positions with feel on wall with streches for BLEs - hold each of 7-8 poses/positions for 1 minute/8-10 slow, deep breaths - continuing about every 2-3 days at home - suggested performing every day or every other day as able  Hip flexor stretch - Michael Test position [facility] for 1 minute x 2-3 reps each LE - did NOT perform - made adjustments and now performing in tall kneel with improved stretch and likely more able to incorporate into routine at home  Posterior pelvic tilts - hold for 5-10 seconds x 10 reps - tactile and verbal cues to activate TA and to avoid clenching of PF muscles - reviewed  Static manual stretch and desensitization to perineum - small roll of washcloths - placed in perineal fossa while seated with cues for deep breathing and relaxation, may add thick marker for firm pressure directed around anal sphincter as tolerated    Manual therapy  Patient verbally consented to transvaginal PF muscle assessment and intervention. Transvaginal massage/myofascial release to superficial, middle and deep layers of PF muscles. Noted significant tightness and tenderness to palpation in transverse perineal/perineal body, especially just R of midline. Moderate to severe tenderness to palpation in superficial muscles -  in R posterior lateral superficial muscles between 5 and 8 o'clock- sustained stretch, pill rolling and ironing of superficial muscles for massage and myofascial release. Obturator internus with moderate tightness and tenderness to palpation in R side.    External introitus/perineal body. Continued to encourage stretches for LE muscles as well as more direct stretches to and desensitization techniques for PF muscles. Continue to educate in generalized down regulation of her nervous system while addressing tissue specific tightness/tension which is gradually improving with transvaginal and transrectal manual techniques. Patient would definitely benefit from pelvic floor physical therapy for continued education and activities to improve tissue mobility of superficial and deeper PF muscle structures to allow patient to better tolerate vaginal penetration for intercourse and gynecological assessments.  In addition, will continue to address control/ coordination and endurance of pelvic floor, core and hip muscles to improve general mobility and improve ability to prepare for additional pregnancies and deliveries in her future. Treatment/Activity Tolerance:    Patient tolerated treatment well   Good body awareness and able to delineate painful responses. Continue to encourage gentle touch for desensitization and recognizing touch as stimuli other than pain. GOALS:  Patient stated goal: reduce the amount of scar tissue to prevent a procedure to retear and stitch back up  [x]? ? Progressing: []?? Met: []?? Not Met: []?? Adjusted        Therapist goals for Patient:      Short Term Goals: To be achieved in: 4-5 sessions  1. Patient will have a decrease in pain during attempts with vaginal penetration to indicate improvement in pelvic floor flexibility, relaxation, and muscle coordination. [x]? ? Progressing: []?? Met: []?? Not Met: []?? Adjusted  2. Perform HEP for pelvic floor and core muscle groups with minimal direction from therapist as she progresses to become more independent managing her pelvic pressure and PF muscle weakness. [x]? ? Progressing: []?? Met: []?? Not Met: []?? Adjusted  3.  Report using 1-2 behavioral modification strategies to reduce urinary/bowel

## 2021-07-13 ENCOUNTER — HOSPITAL ENCOUNTER (OUTPATIENT)
Dept: PHYSICAL THERAPY | Age: 36
Setting detail: THERAPIES SERIES
Discharge: HOME | End: 2021-07-13
Payer: COMMERCIAL

## 2021-07-13 NOTE — PROGRESS NOTES
Physical Therapy  Cancellation/No-show Note  Patient Name:  Jon Foster  :  1985   Date:  2021  Cancelled visits to date: 1  No-shows to date: 0    For today's appointment patient:  [x]  Cancelled  [x]  Rescheduled appointment  []  No-show     Reason given by patient:  [x]  Patient ill  []  Conflicting appointment  []  No transportation    []  Conflict with work  []  No reason given  [x]  Other: Not feeling well after second COVID-19 vaccination.       Comments:  Rescheduled next appointment to  @ 11:15 AM.     Electronically signed by:  Ariel Ryder, PT #[id number]

## 2021-07-16 ENCOUNTER — HOSPITAL ENCOUNTER (OUTPATIENT)
Dept: PHYSICAL THERAPY | Age: 36
Setting detail: THERAPIES SERIES
Discharge: HOME | End: 2021-07-16
Payer: COMMERCIAL

## 2021-07-16 PROCEDURE — 97110 THERAPEUTIC EXERCISES: CPT | Performed by: PHYSICAL THERAPIST

## 2021-07-16 PROCEDURE — 97140 MANUAL THERAPY 1/> REGIONS: CPT | Performed by: PHYSICAL THERAPIST

## 2021-07-16 PROCEDURE — 97530 THERAPEUTIC ACTIVITIES: CPT | Performed by: PHYSICAL THERAPIST

## 2021-07-16 NOTE — PROGRESS NOTES
[address] [facility] [place] [facility]  Phone: [phone]  Fax: [phone]        Physical Therapy Daily Treatment Note    Date: 2021    Patient Name: Milton Verduzco : 1985 MRN: [id number]    Restrictions/Precautions:    Medical/Treatment Diagnosis Information:    · Diagnosis: disruption of perineal OF wound (C09.5)    Insurance/Certification information: PT Insurance Information: [facility]    Physician Information: Referring Practitioner: Rox Arenas MD    Plan of care signed (Y/N): Yes, received from Dr. Shavonne Maldonado on 2021      Visit# / total visits:     Time in: 1110 Timed Treatment: 1220 Total Treatment Time: 70 minutes    ________________________________________________________________________________________    Pain Level: 4-5/10 during initial penetration and during extraction due to superficial irritation/pain    SUBJECTIVE:   Patient had to cancel session earlier this week due to feeling ill/headache after COVID vaccination. Was not able to perform exercises as regularly this week due to not feeling well. Patient reports generally improved tolerance of vaginal penetration, able to tolerate full penetration with less pain. Performs massage of perineal tissue with her partner prior to penetration but still finds herself tensing up in anticipation of penetration.        OBJECTIVE:    Treatment Interventions Implemented     HEP instruction: Written HEP instructions provided and reviewed   Diaphragmatic breathing - coordinating with pelvic floor muscle activities - more automatic, but occ cues for deep breathing to promote decreased tension/relaxation during manual techniques     Piriformis stretch - figure four supine and then sitting - hold 8-10 breaths/~1 minute x 2 reps each LE - increased tightness noted in L LE compared to R - reviewed - mostly performing during yoga - emphasized need to stretch hips, randolph piriformis after walking for exercise      Hip adductor stretch - supine - holding for 5+ minutes during manual soft tissue intervention to hip adductors - advised to hold stretch for 3-5 minutes, 1-2 times/day - reviewed  Deep squat - hold for 2 minutes for perineum and hip add stretch - encouraged as stretch after walking for exercise  Yoga for PF reaxation - inverted positions with feel on wall with streches for BLEs - hold each of 7-8 poses/positions for 1 minute/8-10 slow, deep breaths - continuing about every 2-3 days at home - suggested performing every day or every other day as able   Hip flexor stretch - Michael Test position [facility] for 1 minute x 2-3 reps each LE - did NOT perform - made adjustments and now performing in tall kneel with improved stretch and likely more able to incorporate into routine at home  Posterior pelvic tilts - hold for 5-10 seconds x 10 reps - tactile and verbal cues to activate TA and to avoid clenching of PF muscles - reviewed  Static manual stretch and desensitization to perineum - small roll of washcloths - placed in perineal fossa while seated with cues for deep breathing and relaxation, may add thick marker for firm pressure directed around anal sphincter as tolerated - reviewed use for sustained pressure and gentle stretch of perineum randolph with increased tension noted after headaches    Manual therapy  Patient verbally consented to transvaginal PF muscle assessment and intervention. Transvaginal massage/myofascial release to superficial, middle and deep layers of PF muscles. Mild to moderate tenderness and tightness with transvaginal palpation in superficial muscles - randolph directly posterior today perineal body/transverse perineum and external anal sphincter- sustained stretch, pill rolling and ironing of superficial muscles for massage and myofascial release.   Obturator internus with moderate tightness and tenderness to palpation in R side - performed release with internal and external points of contact. External massage and myofascial release to superficial PF muscles with most tenderness noted in transverse peroneal/peroneal body and posterior into/around external anal sphincter both in supine/goddess positioning and in prone. In prone, trigger point release and massage to bilateral piriformis and obturator internus with moderate tightness noted, but most severe tenderness note just lateral to external anal sphincter, L>R. Patient verbally consented to external and transrectal stretch of external anal sphincter with tightness and tenderness noted directly anterior toward junction of external anal sphincter and transverse perineal muscles. Education  Continued need for deep breathing throughout day, especially during stressful situations and as anticipating return to work and other stressors. Reviewed need for continued desensitization of perineum, specifically superficial muscles and tissues around scar from vaginal childbirth. Emphasized need for continued stretching of LE muscle attached to pelvis, especially after walking for exercise, and more direct desensitization and gentle stretch of PF muscles using towel/washcloth roll and may add thick marker for increased firm pressure if able to tolerate. Discussed strategies for managing generalize increased tension and tension specific to her PF during and after headaches.        ASSESSMENT:    Patient continues to tolerate full penile penetration for intercourse this past week, with slightly less pain and discomfort, 4-5/10 during intercourse. Patient continues with increased tolerance for stretching and manual techniques for posterior introitus as appears to have slight desensitization with self-massage and stretching at home.   Superficial PF muscles including transverse perineal and external anal sphincter, continue to be moderately to severely tight and tender to palpation which is likely contributing to patient's inability to tolerate full vaginal penetration without discomfort. Continued to note scar tissue with decreased mobility and increased sensitivity at posterior introitus/perineal body. Encourage stretches for LE muscles, especially after walking for exercise, as well as more direct stretches to and desensitization techniques for PF muscles. Emphasized need for generalized down regulation of her nervous system while addressing tissue specific tightness/tension which is gradually improving with transvaginal and transrectal manual techniques. Patient would definitely benefit from pelvic floor physical therapy for continued education and activities to improve tissue mobility of superficial and deeper PF muscle structures to allow patient to better tolerate vaginal penetration for intercourse and gynecological assessments.  In addition, will continue to address control/ coordination and endurance of pelvic floor, core and hip muscles to improve general mobility and improve ability to prepare for additional pregnancies and deliveries in her future. Treatment/Activity Tolerance:    Patient tolerated treatment well   Good body awareness and able to delineate painful responses. Continue to encourage gentle touch for desensitization and recognizing touch as stimuli other than pain. GOALS:  Patient stated goal: reduce the amount of scar tissue to prevent a procedure to retear and stitch back up  [x]? ? Progressing: []?? Met: []?? Not Met: []?? Adjusted        Therapist goals for Patient:      Short Term Goals: To be achieved in: 4-5 sessions  1. Patient will have a decrease in pain during attempts with vaginal penetration to indicate improvement in pelvic floor flexibility, relaxation, and muscle coordination. [x]? ? Progressing: []?? Met: []?? Not Met: []?? Adjusted  2. Perform HEP for pelvic floor and core muscle groups with minimal direction from therapist as she progresses to become more independent managing her pelvic pressure and PF muscle weakness. [x]? ? Progressing: []?? Met: []?? Not Met: []?? Adjusted  3. Report using 1-2 behavioral modification strategies to reduce urinary/bowel complaints through dietary and mechanical changes, with focus on full emptying of bladder with each urination and using optimal positioning and deep breathing for relaxation of posterior PF muscles during defacation, reducing need to strain.   [x]? ? Progressing: []?? Met: []?? Not Met: []?? Adjusted     Plan: Continue per plan of care   Patient is making progress with improved tissue mobility and improved ability to tolerate vaginal penetration. Due to schedule conflicts, including patient returning to work next week, will see patient in 3 weeks.   Next scheduled appointment is on 8/6 at 11AM.                Electronically signed by Myah Baker, PT #[id number] on 7/16/2021 at 11:11 AM

## 2021-08-06 ENCOUNTER — HOSPITAL ENCOUNTER (OUTPATIENT)
Dept: PHYSICAL THERAPY | Age: 36
Setting detail: THERAPIES SERIES
Discharge: HOME | End: 2021-08-06
Payer: COMMERCIAL

## 2021-08-06 PROCEDURE — 97110 THERAPEUTIC EXERCISES: CPT | Performed by: PHYSICAL THERAPIST

## 2021-08-06 PROCEDURE — 97140 MANUAL THERAPY 1/> REGIONS: CPT | Performed by: PHYSICAL THERAPIST

## 2021-08-06 PROCEDURE — 97530 THERAPEUTIC ACTIVITIES: CPT | Performed by: PHYSICAL THERAPIST

## 2021-08-06 NOTE — PROGRESS NOTES
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 Thomas Leigh on 2021      Visit# / total visits:     Time in: 1100 Timed Treatment: 1225 Total Treatment Time: 85 minutes    ________________________________________________________________________________________    Pain Level: 6-7/10 during initial penetration and during extraction due to superficial irritation/pain - reports intercourse only twice since last visit due to time constraints and other priorities    SUBJECTIVE:   Patient returned to work in last three weeks and childcare provided backed out on her so reports additional increased stress. Baby has been waking up 1-2 times per night, so also having her sleep interrupted. Feels that her pelvic floor issues have regressed since she has not been devoting much if any time towards her exercises and manual techniques. Patient reports slightly worse tolerance of vaginal penetration, especially with initial insertion and withdraw. Patient has talked to her spouse about trying to modify their routines to make time for intimacy and wishes to make activities to help her PF more of a priority as she settles into her new routine.        OBJECTIVE:    Treatment Interventions Implemented     HEP instruction: Written HEP instructions provided and reviewed   Diaphragmatic breathing -deep breathing to promote decreased tension/relaxation during manual techniques as tends to reflexively tense/tighten     Piriformis stretch - figure four supine and then sitting - hold 8-10 breaths/~1 minute x 2 reps each LE - increased tightness noted in L LE compared to R - reviewed - mostly performing during yoga - emphasized need to stretch hips, randolph piriformis with tendency to sit for prolonged periods of time since returning to work 3 days/week     Hip adductor stretch - supine - holding for 5+ minutes during manual soft tissue intervention to hip adductors - advised to hold stretch for 3-5 minutes, 1-2 times/day - reviewed  Deep squat - hold for 2 minutes for perineum and hip add stretch - encouraged as stretch after walking for exercise  Yoga for PF reaxation - inverted positions with feel on wall with streches for BLEs - hold each of 7-8 poses/positions for 1 minute/8-10 slow, deep breaths - continuing about every 2-3 days at home - suggested performing every day or every other day as able - emphasized need to return to performing more regularly  Hip flexor stretch - Michael Test position [facility] for 1 minute x 2-3 reps each LE - did NOT perform - made adjustments and now performing in tall kneel with improved stretch and likely more able to incorporate into routine at home  Posterior pelvic tilts - hold for 5-10 seconds x 10 reps - tactile and verbal cues to activate TA and to avoid clenching of PF muscles - reviewed  Static manual stretch and desensitization to perineum - small roll of washcloths - placed in perineal fossa while seated with cues for deep breathing and relaxation, may add thick marker for firm pressure directed around anal sphincter as tolerated - reviewed use for sustained pressure and gentle stretch of perineum randolph with increased tension  - possibly sit on therapy ball during working hours    Manual therapy  Patient verbally consented to transvaginal PF muscle assessment and intervention.    Transvaginal massage/myofascial release to superficial, middle and deep layers of PF muscles. Mild to moderate tenderness and tightness with transvaginal palpation in superficial muscles - randolph directly posterior perineal body/transverse perineum and external anal sphincter- sustained stretch, pill rolling and ironing of superficial muscles for massage and myofascial release. Obturator internus with moderate tightness and tenderness to palpation in R side - performed release with internal and external points of contact. External massage and myofascial release to superficial PF muscles with most tenderness noted in transverse peroneal/peroneal body and posterior into/around external anal sphincter both in supine/goddess positioning and in prone. Noted scar tissue band at surface of skin and in superficial layers of PF muscles parallel to transverse perineal at level of posterior fourchette - able to apply superficial stretch which induced burning discomfort. In prone, trigger point release and massage to bilateral piriformis and obturator internus with severe tightness and tenderness noted on L and moderate to severe tenderness noted on R, but most severe tenderness note just lateral to external anal sphincter, L>R. Patient verbally consented to external and transrectal stretch of external anal sphincter with tightness and tenderness noted toward junction of external anal sphincter and transverse perineal muscles. Reflexive contraction of anal sphincter and surrounding glut muscles noted throughout. Education  Continued need for deep breathing throughout day, especially during stressful situations and as dealing with increased stress with returning to work and modifying childcare arrangements. Reviewed need for continued desensitization of perineum, specifically superficial muscles and tissues around scar from vaginal childbirth.   Emphasized need for continued stretching of LE muscle attached to pelvis, especially after walking for exercise and sitting for prolonged periods of time, activities to improve tissue mobility of superficial and deeper PF muscle structures to allow patient to better tolerate vaginal penetration for intercourse and gynecological assessments.  In addition, will continue to address control/ coordination and endurance of pelvic floor, core and hip muscles to improve general mobility and improve ability to prepare for additional pregnancies and deliveries in her future. Treatment/Activity Tolerance:    Patient tolerated treatment well   Good body awareness and able to delineate painful responses. Continue to encourage gentle touch for desensitization and recognizing touch as stimuli other than pain. GOALS:  Patient stated goal: reduce the amount of scar tissue to prevent a procedure to retear and stitch back up  [x]? ? Progressing: []?? Met: []?? Not Met: []?? Adjusted        Therapist goals for Patient:      Short Term Goals: To be achieved in: 4-5 sessions  1. Patient will have a decrease in pain during attempts with vaginal penetration to indicate improvement in pelvic floor flexibility, relaxation, and muscle coordination.  []?? Progressing: [x]? ? Met: []?? Not Met: []?? Adjusted  2. Perform HEP for pelvic floor and core muscle groups with minimal direction from therapist as she progresses to become more independent managing her pelvic pressure and PF muscle weakness. []?? Progressing: [x]? ? Met: []?? Not Met: []?? Adjusted  3. Report using 1-2 behavioral modification strategies to reduce urinary/bowel complaints through dietary and mechanical changes, with focus on full emptying of bladder with each urination and using optimal positioning and deep breathing for relaxation of posterior PF muscles during defacation, reducing need to strain.   []? ? Progressing: [x]? ? Met: []?? Not Met: []?? Adjusted     Long Term Goals: To be achieved in: 8-10 sessions     1.  Improve score of quality of life index measure, PFDI-20, to 30 or less (initial eval - 44.8) disability index demonstrating improved quality of life with less life interruptions due to pelvic pressure/perineal pain allowing patient to fully participate in social activities, including vaginal penetration for intercourse and gynecological examinations. []? Progressing: []? Met: []? Not Met: []? Adjusted  2. Patient will report ability to tolerate penetration without increase in pain to progress towards intercourse / examinations without pain or limitation. []? Progressing: []? Met: []? Not Met: []? Adjusted  3. Patient will return to functional activities including breastfeeding and caring for her baby without increased symptoms or restriction. []? Progressing: []? Met: []? Not Met: []? Adjusted  4. Report using 3-4 behavioral modification strategies to reduce urinary/bowel complaints through dietary and mechanical changes, with focus on full emptying of bladder with each urination and using optimal positioning and deep breathing for relaxation of posterior PF muscles during defacation, reducing need to strain. []? Progressing: []? Met: []? Not Met: []? Adjusted            5. Rate severity of the problem related to pelvic pain to 2-3 or less on scale of 0-10 with 0 being no problem and 10 being the worst - (4-5 reported at intial eval). []? Progressing: []? Met: []? Not Met: []? Adjusted            6. Perform HEP for pelvic floor and core muscle groups independently as she progresses to self-manage her pelvic pressure and PF muscle weakness. []? Progressing: []? Met: []? Not Met: []? Adjusted            Plan: Continue per plan of care   Patient is making progress with improved tissue mobility and improved ability to tolerate vaginal penetration. Due to schedule conflicts, including patient returning to work next week, will see patient in 3 weeks.   Next scheduled appointment is on 8/20 at 11AM.                Electronically signed by Gus Beckford, PT #[id number] on 8/6/2021 at 12:46 PM

## 2021-08-19 NOTE — PROGRESS NOTES
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 Yaz Gray on 2021      Visit# / total visits:     Time in: 1105 Timed Treatment: 1220 Total Treatment Time: 75 minutes    ________________________________________________________________________________________    Pain Level: 4/10 during initial penetration and during extraction due to superficial irritation/pain - reports intercourse only twice since last visit   SUBJECTIVE:   Patient reports that she is adjusting to new role at work and having sitter coming to their home, which can be distracting but overall working well. Baby has been sleeping much better, even through the night, so more rested. Able to perform exercises more regularly. Reports intercourse has improved slightly since last visit but still uncomfortable ~4/10 pain especially with insertion and withdraw. Did use more lubricant which improved discomfort.      OBJECTIVE:    Treatment Interventions Implemented     HEP instruction: Written HEP instructions provided and reviewed   Diaphragmatic breathing -deep breathing to promote decreased tension/relaxation during manual techniques as tends to reflexively tense/tighten     Piriformis stretch - figure four supine and then sitting - hold 8-10 breaths/~1 minute x 2 reps each LE - increased tightness noted in L LE compared to R - reviewed - mostly performing during yoga - reviewed need to stretch hips, randolph piriformis with tendency to sit for prolonged periods of time since returning to work 3 days/week      Hip adductor stretch - supine - holding for 5+ minutes during manual soft tissue intervention to hip adductors - advised to hold stretch for 3-5 minutes, 1-2 times/day - reviewed  Deep squat - hold for 2 minutes for perineum and hip add stretch - encouraged as stretch after walking for exercise  Yoga for PF reaxation - inverted positions with feel on wall with streches for BLEs - hold each of 7-8 poses/positions for 1 minute/8-10 slow, deep breaths - continuing about every 2-3 days at home - suggested performing every day or every other day as able - emphasized need to return to performing more regularly - tends to perform less frequently due to time commitment compared to just single hip stretches  Hip flexor stretch - Michael Test position [facility] for 1 minute x 2-3 reps each LE - did NOT perform - made adjustments and now performing in tall kneel with improved stretch and likely more able to incorporate into routine at home  Posterior pelvic tilts - hold for 5-10 seconds x 10 reps - tactile and verbal cues to activate TA and to avoid clenching of PF muscles - reviewed  Static manual stretch and desensitization to perineum - small roll of hand towel - placed in perineal fossa while seated with cues for deep breathing and relaxation, may add thick marker for firm pressure directed around anal sphincter as tolerated - reviewed use for sustained pressure and gentle stretch of perineum randolph with increased tension  - possibly sit on towel roll during working hours    Manual therapy  Patient verbally consented to transvaginal PF muscle assessment and intervention. Transvaginal massage/myofascial release to superficial, middle and deep layers of PF muscles.   Mild to moderate tenderness and tightness with transvaginal palpation in superficial muscles - randolph directly posterior perineal body/transverse perineum and external anal sphincter- sustained stretch, pill rolling and ironing of superficial muscles for massage and myofascial release. Obturator internus with moderate tightness and tenderness to palpation in R side - performed release with internal and external points of contact. External massage and myofascial release to superficial PF muscles with most tenderness noted in transverse peroneal/peroneal body and posterior into/around external anal sphincter both in supine/goddess positioning and in prone. Noted scar tissue band at surface of skin and in superficial layers of PF muscles parallel to transverse perineal at level of posterior fourchette - able to apply superficial stretch which induced burning discomfort but did subside with sustained pressure. In prone, trigger point release and massage to bilateral piriformis and obturator internus with severe tightness and tenderness noted on L and moderate to severe tenderness noted on R, but most severe tenderness note just lateral to external anal sphincter, L>R. Patient verbally consented to external and transrectal stretch of external anal sphincter with tightness and tenderness noted toward junction of external anal sphincter and transverse perineal muscles. Slightly less reflexive contraction of anal sphincter and surrounding glut muscles noted throughout. Education  Continued need for deep breathing throughout day, especially during stressful situations and as dealing with increased stress with returning to work and modifying childcare arrangements. Reviewed need for continued desensitization of perineum, specifically superficial muscles and tissues around scar from vaginal childbirth. Emphasized need for continued stretching of LE muscle attached to pelvis, especially after walking for exercise and sitting for prolonged periods of time, and more direct desensitization and gentle stretch of PF muscles using towel/washcloth roll.   Reviewed recommendation to avoid prolonged sitting -  may use therapy ball to sit on or stand to change positions while she is working from home. Discussed strategies for managing generalize increased tension and tension specific to her PF during throughout the day.    Educated in generalized vagus nerve stimulation for overall down regulation of the nervous system - rubbing in gentle circles behind her ears. ASSESSMENT:    Patient able to tolerate full penile penetration for intercourse twice in past 2 weeks, with slightly cdecreased pain and discomfort, 4/10 during intercourse. Patient feels that she has returned to feeling about as well as she did prior to most recent setback but realizes that she can and will feel better when she more consistently performs her PF exercises and manual techniques. Superficial PF muscles including transverse perineal and external anal sphincter, continue to be moderately to severely tight and tender to palpation which is likely contributing to patient's inability to tolerate full vaginal penetration without discomfort. Continued to note scar tissue with decreased mobility and increased sensitivity at posterior introitus/perineal body. Encouraged stretches for LE muscles, especially after walking for exercise and sitting for prolonged periods, as well as more direct stretches to and desensitization techniques for PF muscles. Continue to emphasize need for generalized down regulation of her nervous system while addressing tissue specific tightness/tension which is gradually improving with transvaginal and transrectal manual techniques.     Patient would definitely benefit from pelvic floor physical therapy for continued education and activities to improve tissue mobility of superficial and deeper PF muscle structures to allow patient to better tolerate vaginal penetration for intercourse and gynecological assessments.  In addition, will continue to address control/ coordination and endurance of pelvic Met: []? Not Met: []? Adjusted  2. Patient will report ability to tolerate penetration without increase in pain to progress towards intercourse / examinations without pain or limitation. []? Progressing: []? Met: []? Not Met: []? Adjusted  3. Patient will return to functional activities including breastfeeding and caring for her baby without increased symptoms or restriction. []? Progressing: []? Met: []? Not Met: []? Adjusted  4. Report using 3-4 behavioral modification strategies to reduce urinary/bowel complaints through dietary and mechanical changes, with focus on full emptying of bladder with each urination and using optimal positioning and deep breathing for relaxation of posterior PF muscles during defacation, reducing need to strain. []? Progressing: []? Met: []? Not Met: []? Adjusted            5. Rate severity of the problem related to pelvic pain to 2-3 or less on scale of 0-10 with 0 being no problem and 10 being the worst - (4-5 reported at intial eval). []? Progressing: []? Met: []? Not Met: []? Adjusted            6. Perform HEP for pelvic floor and core muscle groups independently as she progresses to self-manage her pelvic pressure and PF muscle weakness. []? Progressing: []? Met: []? Not Met: []? Adjusted            Plan: Continue per plan of care   Patient is making progress with improved tissue mobility and improved ability to tolerate vaginal penetration. Due to schedule conflicts, including patient returning to work next week, will see patient in 3 weeks.   Next scheduled appointment is on 9/10 at 11AM.                Electronically signed by Gonzalez Herrmann, PT #[id number] on 8/20/2021 at 11:05 AM

## 2021-08-20 ENCOUNTER — HOSPITAL ENCOUNTER (OUTPATIENT)
Dept: PHYSICAL THERAPY | Age: 36
Setting detail: THERAPIES SERIES
Discharge: HOME | End: 2021-08-20
Payer: COMMERCIAL

## 2021-08-20 PROCEDURE — 97140 MANUAL THERAPY 1/> REGIONS: CPT | Performed by: PHYSICAL THERAPIST

## 2021-08-20 PROCEDURE — 97530 THERAPEUTIC ACTIVITIES: CPT | Performed by: PHYSICAL THERAPIST

## 2021-08-23 ENCOUNTER — OFFICE VISIT (OUTPATIENT)
Dept: PRIMARY CARE CLINIC | Age: 36
End: 2021-08-23

## 2021-08-23 DIAGNOSIS — R05.9 COUGH: Primary | ICD-10-CM

## 2021-08-24 LAB — SARS-COV-2: NOT DETECTED

## 2021-09-09 NOTE — PROGRESS NOTES
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 Luann Reese on 2021      Visit# / total visits:     Time in: 1100 Timed Treatment: 1225 Total Treatment Time: 85 minutes    ________________________________________________________________________________________    Pain Level: 3-4/10 during initial penetration, with friction during intercourse and during extraction mostly due to superficial irritation/pain     SUBJECTIVE:   Trip to [place] over the holiday weekend to visit family was stressful due to the baby crying. Patient reports that she is adjusting to new role at work and having sitter coming to their home, which can be distracting but overall working well. Having difficulty adjusting to new role and balance role as a parent and work. Feels that her boss sees as being less valuable as an employee. Baby has been generally sleeping through the night, but has been teething and rolling onto belly and can't roll back onto back. Has been able to perform exercises more regularly and has been trying to sit on a towel roll for sustained stretching of perineum. Reports that she perineum increased in sensitivity during ovulation. Reports intercourse 3-4 times since last visit -  has improved slightly since last visit but still uncomfortable ~4/10 pain especially with insertion and withdraw. Complaining of discomfort with friction in most superficial tissues.    Patient tends to perform direct massage rather than the indirect stretching of hip/buttocks.      OBJECTIVE:    Treatment Interventions Implemented     HEP instruction: Written HEP instructions provided and reviewed   Diaphragmatic breathing -deep breathing to promote decreased tension/relaxation during manual techniques as tends to reflexively tense/tighten     Piriformis stretch - figure four sitting - hold 8-10 breaths/~1 minute x 2 reps each LE - increased tightness noted in R LE compared to L - reviewed proper technique and need to stretch hips, randolph piriformis with tendency to sit for prolonged periods of time since returning to work - able to perform while sitting in any chair      Hip adductor stretch - supine - holding for 5+ minutes during manual soft tissue intervention to hip adductors - advised to hold stretch for 3-5 minutes, 1-2 times/day - reviewed  Deep squat - hold for 2 minutes for perineum and hip add stretch - encouraged as stretch after walking for exercise  Yoga for PF reaxation - inverted positions with feel on wall with streches for BLEs - hold each of 7-8 poses/positions for 1 minute/8-10 slow, deep breaths - suggested performing every day if possible or every other day as able - emphasized need to return to performing more regularly - tends to perform less frequently due to time commitment compared to just single hip stretches    Hip flexor stretch - Michael Test position [facility] for 1 minute x 2-3 reps each LE - did NOT perform - made adjustments and now performing in tall kneel with improved stretch and likely more able to incorporate into routine at home  Posterior pelvic tilts - hold for 5-10 seconds x 10 reps - tactile and verbal cues to activate TA and to avoid clenching of PF muscles - reviewed  Static manual stretch and desensitization to perineum - small roll of hand towel - placed in perineal fossa while seated with cues for deep breathing and relaxation, may add thick marker for firm pressure directed around anal sphincter as tolerated - reviewed use for sustained pressure and gentle stretch of perineum randolph with increased tension  - possibly sit on towel roll during working hours - performing more consistently for up to about 10 minutes at a time with reportedly decreased sensitization     Manual therapy  Patient verbally consented to transvaginal PF muscle assessment and intervention. External massage and myofascial release to superficial PF muscles with most tenderness noted in transverse peroneal/peroneal body and posterior into/around external anal sphincter both in supine/goddess positioning and in prone. Noted scar tissue band at surface of skin and in superficial layers of PF muscles parallel to transverse perineal at level of posterior fourchette - able to apply superficial stretch which induced burning discomfort but did subside with sustained pressure. Transvaginal massage/myofascial release to superficial, middle and deep layers of PF muscles. Mild to moderate tenderness and tightness with transvaginal palpation in superficial muscles - randolph directly posterior perineal body/transverse perineum and external anal sphincter- sustained stretch, pill rolling and ironing of superficial muscles for massage and myofascial release. Obturator internus with moderate tightness and tenderness to palpation in R side - performed release with internal and external points of contact. Mild to no specific tightness or tenderness in L middle/deep PF muscles. In prone, trigger point release and massage to bilateral piriformis and obturator internus with moderate to severe tightness and tenderness noted on R and moderate tenderness noted on R, but most severe tenderness note just lateral to external anal sphincter, L>R. Demonstrates reflexive/protective contraction of muscles in and around external anal sphincter with initial touch to external areas around anal sphincter.    Patient verbally consented to external and transrectal stretch of external anal sphincter with tightness and tenderness noted toward junction of external anal sphincter and transverse perineal muscles. Slightly less reflexive contraction of anal sphincter and surrounding glut muscles noted throughout and improved with sustained stretch. Education  Continued review for need for deep breathing throughout day, especially during stressful situations and as dealing with increased stress with returning to work and modifying childcare arrangements. Reviewed need for continued desensitization of perineum, specifically superficial muscles and tissues around scar from vaginal childbirth. Emphasized need for continued stretching of LE muscle attached to pelvis, especially after walking for exercise and sitting for prolonged periods of time, and more direct desensitization and gentle stretch of PF muscles using towel/washcloth roll. Discussed strategies for managing generalize increased tension and tension specific to her PF during throughout the day.  Emphasized need to perform piriformis stretch throughout the day while sitting. Reviewed generalized vagus nerve stimulation for overall down regulation of the nervous system - rubbing in gentle circles behind her ears. Reviewed body mechanics especially with lifting baby as she is growing bigger and weighs more. Need for contraction of TA prior to any functional activity and exhalation during exertional movements. Reviewed hormonal shift with ceasing breastfeeding and return of ovulation - both which may help improve the elasticity of her vaginal canal and surrounding perineum. ASSESSMENT:    Patient able to tolerate full penile penetration for intercourse ~4 times in past 3 weeks, with slightly decreased pain and discomfort, reportedly 3-4/10 pain during intercourse.   Patient is consistently performing her PF manual techniques, but is not consistently performing her hip/buttocks stretches. Emphasized need to continue to stretch the hip and buttocks muscles which indirectly effect the tension of the PF muscles. Encouraged stretches for LE muscles, especially after walking for exercise and sitting for prolonged periods, as well as more direct stretches to and desensitization techniques for PF muscles. Superficial PF muscles including transverse perineal and external anal sphincter, continue to be moderately tight and tender to palpation which is likely contributing to patient's inability to tolerate full vaginal penetration without discomfort, but overall seem to be less sensitive with improving tolerance. Continued to note scar tissue with decreased mobility and increased sensitivity at posterior introitus/perineal body. Continue to emphasize need for generalized down regulation of her nervous system while addressing tissue specific tightness/tension which is gradually improving with transvaginal and transrectal manual techniques. Patient would continue to benefit from pelvic floor physical therapy for continued education and activities to improve tissue mobility of superficial and deeper PF muscle structures to allow patient to better tolerate vaginal penetration for intercourse and gynecological assessments.  In addition, will continue to address control/ coordination and endurance of pelvic floor, core and hip muscles to improve general mobility and improve ability to prepare for additional pregnancies and deliveries in her future. Treatment/Activity Tolerance:    Patient tolerated treatment well   Good body awareness and able to delineate painful responses. Continue to encourage gentle, but firm touch for desensitization and recognizing touch as stimuli other than pain. GOALS:  Patient stated goal: reduce the amount of scar tissue to prevent a procedure to retear and stitch back up  [x]? ? Progressing: []?? Met: []?? Not Met: []?? Adjusted        Therapist goals for Patient:      Short Term Goals: To be achieved in: 4-5 sessions  1. Patient will have a decrease in pain during attempts with vaginal penetration to indicate improvement in pelvic floor flexibility, relaxation, and muscle coordination.  []?? Progressing: [x]? ? Met: []?? Not Met: []?? Adjusted  2. Perform HEP for pelvic floor and core muscle groups with minimal direction from therapist as she progresses to become more independent managing her pelvic pressure and PF muscle weakness. []?? Progressing: [x]? ? Met: []?? Not Met: []?? Adjusted  3. Report using 1-2 behavioral modification strategies to reduce urinary/bowel complaints through dietary and mechanical changes, with focus on full emptying of bladder with each urination and using optimal positioning and deep breathing for relaxation of posterior PF muscles during defacation, reducing need to strain.   []? ? Progressing: [x]? ? Met: []?? Not Met: []?? Adjusted     Long Term Goals: To be achieved in: 8-10 sessions     1. Improve score of quality of life index measure, PFDI-20, to 30 or less (initial eval - 44.8) disability index demonstrating improved quality of life with less life interruptions due to pelvic pressure/perineal pain allowing patient to fully participate in social activities, including vaginal penetration for intercourse and gynecological examinations. Assess Female Sexual Function Index (FSFI) - at initial eval = NT/36 - goal 25+/36. []? Progressing: []? Met: []? Not Met: []? Adjusted  2. Patient will report ability to tolerate penetration without increase in pain to progress towards intercourse / examinations without pain or limitation. []? Progressing: []? Met: []? Not Met: []? Adjusted  3. Patient will return to functional activities including breastfeeding and caring for her baby without increased symptoms or restriction. []? Progressing: []? Met: []? Not Met: []? Adjusted  4.  Report using 3-4 behavioral modification strategies to reduce urinary/bowel complaints through dietary and mechanical changes, with focus on full emptying of bladder with each urination and using optimal positioning and deep breathing for relaxation of posterior PF muscles during defacation, reducing need to strain. []? Progressing: []? Met: []? Not Met: []? Adjusted            5. Rate severity of the problem related to pelvic pain to 2-3 or less on scale of 0-10 with 0 being no problem and 10 being the worst - (4-5 reported at intial eval). []? Progressing: []? Met: []? Not Met: []? Adjusted            6. Perform HEP for pelvic floor and core muscle groups independently as she progresses to self-manage her pelvic pressure and PF muscle weakness. []? Progressing: []? Met: []? Not Met: []? Adjusted            Plan: Continue per plan of care   Patient is making progress with improved tissue mobility and improved ability to tolerate vaginal penetration. Due to overall progression and patient now working 3 days/week, will see patient in ~4+ weeks.   Next scheduled appointment is on 10/6 at 11AM.                Electronically signed by Kelsie Fallon, PT #[id number] on 9/10/2021 at 12:46 PM

## 2021-09-10 ENCOUNTER — HOSPITAL ENCOUNTER (OUTPATIENT)
Dept: PHYSICAL THERAPY | Age: 36
Setting detail: THERAPIES SERIES
Discharge: HOME | End: 2021-09-10
Payer: COMMERCIAL

## 2021-09-10 PROCEDURE — 97530 THERAPEUTIC ACTIVITIES: CPT | Performed by: PHYSICAL THERAPIST

## 2021-09-10 PROCEDURE — 97110 THERAPEUTIC EXERCISES: CPT | Performed by: PHYSICAL THERAPIST

## 2021-09-10 PROCEDURE — 97140 MANUAL THERAPY 1/> REGIONS: CPT | Performed by: PHYSICAL THERAPIST

## 2021-10-05 NOTE — PROGRESS NOTES
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 Curtis Wayne on 2021      Visit# / total visits:     Time in: 1105   Timed out: 1215   Total Treatment Time: 70 minutes    ________________________________________________________________________________________    Pain Level: no soreness at rest, minor discomfort during initial penetration and during extraction mostly due to superficial irritation/pain - realizes that when she is relaxed she has less discomfort, but at times more stress than others    SUBJECTIVE:   Patient reports that patient is now 7 months old, had difficulty over last month with sleeping due to illness. Just started back on anxiety medications after meeting with PCP and talking with OB. Has been getting migraines more frequently and hopefully to decrease headaches. Seems uncertain if stress is causing headaches - lingering constant headache above R eye. Not waking up at night due to headaches. Reports intercourse mild discomfort occasionally with vaginal penetration - much improved since initial assessment and even since her last PF PT appointment about 1 month prior. Patient feels that she is likely ready for discontinuing PF PT at this time, but had questions regarding returning if/when she is nearing the delivery of a future child to assist her to prepare for tissue mobility in preparation for a vaginal birth. OBJECTIVE:  PFDI-20: 10.42  Sub-sections: POPDI-6 Score 0; CRADI-8 Score: 6.25; BABAK-6 Score: 4.12     Female Sexual Function Index (FSFI) 29.6/36 - mild discomfort with vaginal penetration noted but mostly when otherwise tense    Treatment Interventions Implemented     HEP instruction: Written HEP instructions provided and reviewed   Diaphragmatic breathing -deep breathing to promote decreased tension/relaxation during manual techniques as tends to reflexively tense/tighten     Piriformis stretch - figure four sitting - hold 8-10 breaths/~1 minute x 2 reps each LE - increased tightness noted in R LE compared to L - reviewed proper technique and need to stretch hips, randolph piriformis with tendency to sit for prolonged periods of time since returning to work - able to perform while sitting in any chair      Hip adductor stretch - supine - holding for 5+ minutes during manual soft tissue intervention to hip adductors - advised to hold stretch for 3-5 minutes, 1-2 times/day - reviewed  Deep squat - hold for 2 minutes for perineum and hip add stretch - encouraged as stretch after walking for exercise  Yoga for PF reaxation - inverted positions with feel on wall with streches for BLEs - hold each of 7-8 poses/positions for 1 minute/8-10 slow, deep breaths - suggested performing every day if possible or every other day as able - emphasized need to return to performing more regularly - tends to perform less frequently due to time commitment compared to just single hip stretches    Hip flexor stretch - Michael Test position [facility] for 1 minute x 2-3 reps each LE - did NOT perform - made adjustments and now performing in tall kneel with improved stretch and likely more able to incorporate into routine at home  Posterior pelvic tilts - hold for 5-10 seconds x 10 reps - tactile and verbal cues to activate TA and to avoid clenching of PF muscles - reviewed  Static manual stretch and desensitization to perineum - small roll of hand towel - placed in perineal fossa while seated with cues for deep breathing and relaxation, may add thick marker for firm pressure directed around anal sphincter as tolerated - reviewed use for sustained pressure and gentle stretch of perineum randolph with increased tension  - possibly sit on rolled washcloth for 2 minutes at a time  Happy baby pose - hold for 2-3 minutes with focus on deep breathing and relaxation    Manual therapy  Patient verbally consented to transvaginal PF muscle assessment and intervention. External massage and myofascial release to superficial PF muscles with most tenderness noted in transverse perineal/perineal body and posterior into/around external anal sphincter both in supine/goddess position. Noted mild scar tissue band at surface of skin and in superficial layers of PF muscles parallel to transverse perineal at level of posterior fourchette - able to apply superficial stretch with improved mobility with sustained pressure. Transvaginal massage/myofascial release to superficial, middle and deep layers of PF muscles. Mild tenderness and tightness with transvaginal palpation in superficial muscles - randolph directly posterior perineal body/transverse perineum and external anal sphincter- sustained stretch, pill rolling and ironing of superficial muscles for massage and myofascial release. Slightly deeper but still superficial muscle layer palpation with moderate tightness and tenderness to palpation in L side and performed myofascial release. Overall, improved tissue elasticity noted with manual techniques and reported less discomfort during initial vaginal penetration and withdraw for intercourse. Education  Continued review for need for deep breathing throughout day, especially during stressful situations. Reviewed need for continued desensitization of perineum, specifically superficial muscles and tissues around scar from vaginal childbirth.   Emphasized need for continued stretching of LE muscle attached to pelvis, especially after walking for exercise and sitting for prolonged periods of time, and more direct desensitization and gentle stretch of PF muscles using towel/washcloth roll. Discussed strategies for managing generalize increased tension and tension specific to her PF during throughout the day.  Emphasized need to get up and walk around or stand every 1-2 hours and may even wish to purchase an adjustable standing desk. Reviewed generalized vagus nerve stimulation for overall down regulation of the nervous system - rubbing in gentle circles behind her ears. Per patient request, instructed in several techniques to stretch neck/subocciptial muscles to help relieve frequent reoccurring headaches. Discussed positional changes to counteract staring at a computer screen or looking downward at hand-held electronic devices. ASSESSMENT:    Patient has progressed well and after 9 visits with PF PT has met all her short term and long term goals. Patient able to tolerate full penile penetration for intercourse with only occasional mild discomfort, mostly when she is aware that she has higher level of stress and increased tension in various areas of her body. Encouraged patient to continue with stretches for LE muscles, especially after walking for exercise and sitting for prolonged periods, as well as more direct stretches to and desensitization techniques for PF muscles. Superficial PF muscles including transverse perineal and external anal sphincter, with only mild tightness allowing improved ability to tolerate full vaginal penetration with only occasional mild discomfort. Due to tendency of increased muscle tension, including tension headaches, continue to emphasize need for generalized down regulation of her nervous system. Patient has maximized her potential for PF PT at this time and wishes to discontinue. Will seek out PF PT in the future as needs arise. Treatment/Activity Tolerance:  Patient tolerated treatment well         GOALS:  Patient stated goal: reduce the amount of scar tissue to prevent a procedure to retear and stitch back up  []? ? Progressing: [x]? ? Met: []?? Not Met: []?? Adjusted        Therapist goals for Patient:      Short Term Goals: To be achieved in: 4-5 sessions  1. Patient will have a decrease in pain during attempts with vaginal penetration to indicate improvement in pelvic floor flexibility, relaxation, and muscle coordination.  []?? Progressing: [x]? ? Met: []?? Not Met: []?? Adjusted  2. Perform HEP for pelvic floor and core muscle groups with minimal direction from therapist as she progresses to become more independent managing her pelvic pressure and PF muscle weakness. []?? Progressing: [x]? ? Met: []?? Not Met: []?? Adjusted  3. Report using 1-2 behavioral modification strategies to reduce urinary/bowel complaints through dietary and mechanical changes, with focus on full emptying of bladder with each urination and using optimal positioning and deep breathing for relaxation of posterior PF muscles during defacation, reducing need to strain.   []? ? Progressing: [x]? ? Met: []?? Not Met: []?? Adjusted     Long Term Goals: To be achieved in: 8-10 sessions     1. Improve score of quality of life index measure, PFDI-20, to 30 or less (initial eval - 44.8) disability index demonstrating improved quality of life with less life interruptions due to pelvic pressure/perineal pain allowing patient to fully participate in social activities, including vaginal penetration for intercourse and gynecological examinations. Assess Female Sexual Function Index (FSFI) - at initial eval = NT/36 - goal 25+/36.  - MET -  10/6/2021 - PFDI-20: 10.42; Female Sexual Function Index (FSFI) 29.6/36  []? Progressing: [x]? Met: []? Not Met: []? Adjusted  2.  Patient will report ability to tolerate penetration without increase in pain to progress towards intercourse / examinations without pain or limitation. []? Progressing: [x]? Met: []? Not Met: []? Adjusted  3. Patient will return to functional activities including breastfeeding and caring for her baby without increased symptoms or restriction. []? Progressing: [x]? Met: []? Not Met: []? Adjusted  4. Report using 3-4 behavioral modification strategies to reduce urinary/bowel complaints through dietary and mechanical changes, with focus on full emptying of bladder with each urination and using optimal positioning and deep breathing for relaxation of posterior PF muscles during defacation, reducing need to strain. []? Progressing: [x]? Met: []? Not Met: []? Adjusted            5. Rate severity of the problem related to pelvic pain to 2-3 or less on scale of 0-10 with 0 being no problem and 10 being the worst - (4-5 reported at intial eval). []? Progressing: [x]? Met: []? Not Met: []? Adjusted            6. Perform HEP for pelvic floor and core muscle groups independently as she progresses to self-manage her pelvic pressure and PF muscle weakness. []? Progressing: [x]? Met: []? Not Met: []? Adjusted              Plan:   Discontinue PF PT at this time.            Electronically signed by Shwetha Aponte, PT #[id number] on 10/6/2021 at 11:06 AM

## 2021-10-06 ENCOUNTER — HOSPITAL ENCOUNTER (OUTPATIENT)
Dept: PHYSICAL THERAPY | Age: 36
Setting detail: THERAPIES SERIES
Discharge: HOME | End: 2021-10-06
Payer: COMMERCIAL

## 2021-10-06 PROCEDURE — 97140 MANUAL THERAPY 1/> REGIONS: CPT | Performed by: PHYSICAL THERAPIST

## 2021-10-06 PROCEDURE — 97110 THERAPEUTIC EXERCISES: CPT | Performed by: PHYSICAL THERAPIST

## 2021-10-06 PROCEDURE — 97530 THERAPEUTIC ACTIVITIES: CPT | Performed by: PHYSICAL THERAPIST

## 2021-12-29 LAB
C. TRACHOMATIS, EXTERNAL RESULT: NEGATIVE
N. GONORRHOEAE, EXTERNAL RESULT: NEGATIVE

## 2022-01-24 ENCOUNTER — OFFICE VISIT (OUTPATIENT)
Dept: DERMATOLOGY | Age: 37
End: 2022-01-24
Payer: COMMERCIAL

## 2022-01-24 VITALS — TEMPERATURE: 97.1 F

## 2022-01-24 DIAGNOSIS — D22.9 MULTIPLE NEVI: Primary | ICD-10-CM

## 2022-01-24 DIAGNOSIS — L81.4 SOLAR LENTIGO: ICD-10-CM

## 2022-01-24 DIAGNOSIS — L82.1 SK (SEBORRHEIC KERATOSIS): ICD-10-CM

## 2022-01-24 PROCEDURE — 99213 OFFICE O/P EST LOW 20 MIN: CPT | Performed by: DERMATOLOGY

## 2022-01-24 NOTE — PROGRESS NOTES
[facility]  Srinivasa Lazo MD  [phone]      Veronique Anguiano  1985    29 y.o. female     Date of Visit: 1/24/2022    Chief Complaint: skin moles    History of Present Illness:    1. She presents today for evaluation of multiple moles on the scalp, face, trunk and extremities. One on the left lower cheek at the jawline is intermittently scratched by her daughter. She is not aware of any other concerning changes. 2.  She has several freckles on the chest.    3.  She has an asymptomatic lesion on the back. She has a family history of melanoma-her sister and father both have a history of melanoma in situ. Review of Systems:  Skin: + dry skin    Past Medical History, Family History, Surgical History, Medications and Allergies reviewed. Past Medical History:   Diagnosis Date    Chiari I malformation ([address]     repaired    Gastric ulcer     ? from advil    Vocal cord nodules     chronic hoarseness/ voice training no help     Past Surgical History:   Procedure Laterality Date    BRAIN SURGERY  2007    decomp surg/Shabbabian     WISDOM TOOTH EXTRACTION  age 17       Allergies   Allergen Reactions    Imitrex [Sumatriptan]      [place] bad     Outpatient Medications Marked as Taking for the 1/24/22 encounter (Office Visit) with Dedra Marin MD   Medication Sig Dispense Refill    Ubrogepant (UBRELVY) 50 MG TABS Take one tablet at the onset of a headache, repeat in 2 hours if needed. Take no more than 2 doses in a 24 hour period. 4 tablet 0    Prenatal Vit-DSS-Fe Cbn-FA (PRENATAL ADVANTAGE PO) Take by mouth         Social History:  Occupation:  Works part time for [employer], Dad is Willi Levine      Physical Examination       The following were examined and determined to be normal: Psych/Neuro, Scalp/hair, Head/face, Conjunctivae/eyelids, Gums/teeth/lips, Neck, Breast/axilla/chest, Abdomen, Back, RUE, LUE, RLE, LLE and Nails/digits.     The following were examined and determined to be abnormal: None. Well appearing. 1.  Mid vertex scalp with a small round smooth soft pink-brown papule. Left frontal scalp with a well-defined round smooth light brown macule. Left lower cheek near the jawline with a round soft smooth pink papule. Trunk and extremities with scattered well-defined round oval uniformly brown macules and papules. 2.  Upper chest with several round smooth light brown macules. 3.  Mid back with a stuck on appearing verrucous light brown thin plaque. Assessment and Plan     1. Multiple nevi - benign appearing    Consider shave removal of the dermal nevus on the left lower cheek, if it frequently becomes inflamed. Sun protective behaviors, including use of at least SPF 30 sunscreen, and self skin examinations were encouraged. Call for any new or concerning lesions. 2. Solar lentigines    Monitor for change. Sun protection as above. 3. SK (seborrheic keratosis)     Reassurance. Return in about 1 year (around 1/24/2023).     --Ratna Lux MD

## 2022-01-25 LAB
ABO, EXTERNAL RESULT: NORMAL
HEP B, EXTERNAL RESULT: NEGATIVE
HEPATITIS C ANTIBODY, EXTERNAL RESULT: NEGATIVE
HIV, EXTERNAL RESULT: NONREACTIVE
RH FACTOR, EXTERNAL RESULT: POSITIVE
RUBELLA TITER, EXTERNAL RESULT: NORMAL

## 2022-03-03 ENCOUNTER — TELEPHONE (OUTPATIENT)
Dept: DERMATOLOGY | Age: 37
End: 2022-03-03

## 2022-03-03 NOTE — TELEPHONE ENCOUNTER
Pt calling lm on vm states she went to see her gynocolgist  have a rash have some concern pls return call back she is scheduled in January of 2023 [phone] to discuss

## 2022-03-08 ENCOUNTER — OFFICE VISIT (OUTPATIENT)
Dept: DERMATOLOGY | Age: 37
End: 2022-03-08
Payer: COMMERCIAL

## 2022-03-08 VITALS — TEMPERATURE: 97.3 F

## 2022-03-08 DIAGNOSIS — L24.9 IRRITANT DERMATITIS: Primary | ICD-10-CM

## 2022-03-08 PROCEDURE — 99213 OFFICE O/P EST LOW 20 MIN: CPT | Performed by: DERMATOLOGY

## 2022-03-08 RX ORDER — TRIAMCINOLONE ACETONIDE 1 MG/G
CREAM TOPICAL
Qty: 45 G | Refills: 1 | Status: SHIPPED | OUTPATIENT
Start: 2022-03-08

## 2022-04-04 ENCOUNTER — PATIENT MESSAGE (OUTPATIENT)
Dept: DERMATOLOGY | Age: 37
End: 2022-04-04

## 2022-04-04 NOTE — TELEPHONE ENCOUNTER
From: China Rodriguez  To: Dr. Gonzalez Mckeon  Sent: 4/4/2022 12:57 PM EDT  Subject: Suspicious mole    Hi Dr. Winter Sarmiento,    I wanted to message you about a mole on my back that has become rather irritated and raised. Its like I accidentally scratched it and half of it came off. Its no longer symmetrical. Ive attached a few pictures. It doesnt hurt just itches. I know moles can change during pregnancy but wanted to reach out just in case theres something I need to worry about. Please let me know. Thanks!   Lin Norman

## 2022-04-05 ENCOUNTER — OFFICE VISIT (OUTPATIENT)
Dept: DERMATOLOGY | Age: 37
End: 2022-04-05
Payer: COMMERCIAL

## 2022-04-05 VITALS — TEMPERATURE: 97.3 F

## 2022-04-05 DIAGNOSIS — D48.5 NEOPLASM OF UNCERTAIN BEHAVIOR OF SKIN: Primary | ICD-10-CM

## 2022-04-05 PROCEDURE — 11102 TANGNTL BX SKIN SINGLE LES: CPT | Performed by: DERMATOLOGY

## 2022-04-05 NOTE — PATIENT INSTRUCTIONS
Biopsy Wound Care Instructions    · Keep the bandage in place for 24 hours. · Cleanse the wound with mild soapy water daily   Gently dry the area.  Apply Vaseline or petroleum jelly to the wound using a cotton tipped applicator.  Cover with a clean bandage.  Repeat this process until the biopsy site is healed.  If you had stitches placed, continue treating the site until the stitches are removed. Remember to make an appointment to return to have your stitches removed by our staff.  You may shower and bathe as usual.       ** Biopsy results generally take around 7 business days to come back. If you have not heard from us by then, please call the office at [phone]. *Please note that biopsy results are released to both the patient and physician at the same time in [address] Please allow time for your physician to review the results. One of our staff members will reach out to you with the results and plan.

## 2022-04-05 NOTE — PROGRESS NOTES
[facility]  Alma Wen MD  [address]  1985    29 y.o. female     Date of Visit: 4/5/2022    Chief Complaint: changing mole    History of Present Illness:    She presents today for a changing, bleeding mole on the lower back. Review of Systems:  Gen: Feels well, good sense of health. Past Medical History, Family History, Surgical History, Medications and Allergies reviewed. Past Medical History:   Diagnosis Date    Chiari I malformation ([address]     repaired    Gastric ulcer     ? from advil    Vocal cord nodules     chronic hoarseness/ voice training no help     Past Surgical History:   Procedure Laterality Date    BRAIN SURGERY  2007    decomp surg/Shabbabian     WISDOM TOOTH EXTRACTION  age 17       Allergies   Allergen Reactions    Imitrex [Sumatriptan]      [place] bad     Outpatient Medications Marked as Taking for the 4/5/22 encounter (Office Visit) with Valencia Contreras MD   Medication Sig Dispense Refill    triamcinolone (KENALOG) 0.1 % cream Apply to affected areas twice daily for up to 2 weeks or until improved. 45 g 1    Prenatal Vit-DSS-Fe Cbn-FA (PRENATAL ADVANTAGE PO) Take by mouth           Physical Examination       Well appearing. 1.  Central lower back - 3 to 4 mm crusted and raised pink brown papule. Assessment and Plan     1. Neoplasm of uncertain behavior of skin, central lower back - likely traumatized, inflamed nevus    Discussed possible diagnosis; patient agreeable to biopsy (consent obtained). Risks reviewed including discomfort, bleeding, scar and infection. The area(s) to be biopsied were marked with a surgical pen. Alcohol was used to cleanse the site. Local anesthesia was acheived with 1% lidocaine without epinephrine. Shave biopsy was performed using a razor blade. Hemostasis was achieved with aluminum chloride. The wound(s) were dressed with petrolatum and covered with a bandage.   Wound care instructions were reviewed. 1 Specimen (s) sent to pathology. The specimen bottles were appropriately labeled.            --Kirt Hogan MD

## 2022-04-07 LAB — DERMATOLOGY PATHOLOGY REPORT: NORMAL

## 2022-05-17 LAB — RPR, EXTERNAL RESULT: NON REACTIVE

## 2022-05-17 NOTE — PROGRESS NOTES
[address] [facility] 8 [facility] HospitalAndrew cabralC[facility] [address]  Phone: [phone]  Fax: [phone]                                                      Physical Therapy Certification    Dear Geri Gray MD  ,    We had the pleasure of evaluating the following patient for physical therapy services at [place] [address]. A summary of our findings can be found in the initial assessment below. This includes our plan of care. If you have any questions or concerns regarding these findings, please do not hesitate to contact me at the office phone number checked above. Thank you for the referral.       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   [] Yes, Patient intake triggered C-SSRS Screening     [] Completed, no further action required.    [] Completed, PCP notified via Epic    Functional Outcome:   PFDI-20: denies urinary issues/concerns    Female Sexual Function Index (FSFI) 17.9/36    SUBJECTIVE: Patient stated complaint: starting in  sciatic pain in L buttock and radiates to back of L knee, also feeling heaviness and aching - dull-  in pelvic area. Reports that improved vaginal wound after last delivery, able to have intercourse and got pregnant without fertility. Patient reports that vaginal tissue around scar has gotten tighter as her sciatic pain has worsened, and therefore vaginal penetration is no longer as comfortable. Pain - sciatic pain 7/10, pelvic ache 5-8/10, worsens towards end of day  Severity of problem - 7/10  Patient's goal: figure out exercises that she can on her own to relieve the pain, to better prepare for upcoming birth process   Pregnancies: [] No    [x] Yes, if yes # 2 - 1 previous, 1 current - 28 weeks  Deliveries: # and type: 1 vaginal delivery with significant perineal tearing and scarring     4 week or greater of failed trial of PFPT program?   [x] No    [] Yes    PFPT program as defined by \"Completing 4 weeks of an ordered plan of pelvic muscle exercises designed to increase periurethral muscle strength\". Patient underwent bout of PF PT previously that was successful in addressing primary concern. Returning for other issues with advancing pregnancy of second child.      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 - still working from home - has cushion to sit on and tries to vary her positions    Living Status/Prior Level of Function: Prior to this injury / incident, pt was independent with ADLs and IADLs, parent of 16 month old daughter and currently 28 weeks pregnant, just sold and moved out of her home - packing and lifting boxes for move, now living with her parents in their basement and needs to ascend/descend steps to access living space    OBJECTIVE:  Ortho Screen:  Posture   Other Observation - scar at posterior neck due to decompression of chiari  Palpation - moderate tightness in bilateral upper traps  Alignment - WFL    ROM LEFT RIGHT Comments   Cervical Side Bend   Moderate limitations   Cervical Rotation   Moderate limitations   Shoulder Flex      Shoulder Abd      Shoulder ER      Shoulder IR            Lumbar Side Bend   Mild to Moderate limitations   Lumbar Rotation   Mild to Moderate limitations   Hip Flexion      Hip Abd      Hip ER      Hip IR      Hip Extension      Knee Ext      Knee Flex            Hamstring Flex   Moderate limitations - L>R   Piriformis   Moderate limitations - L>R                   Strength LEFT RIGHT Comments   Shoulder flexion   4/5   Shoulder scaption      Shoulder ER      Shoulder IR      Biceps   4/5   Triceps   4/5               Multifidus      Transverse Ab   NT - expanding abdomen due to pregnancy   Hip Flexors      Hip Abductors   3+/5   Hip Extensors      Hip Internal Rotators   4/5   Hip External Rotators   4/5         Abdominals:  Scarring:   [x] No    [] Yes  Diastasis:   [x] No    [] Yes  Functional stability:   [x] No    [] Yes - limited due to expanding abdomen with advancing pregnancy    Pelvic Floor:  Observation  Skin condition - noted engorged tissues of labia, purplish in color, likely vascular in nature  Scarring - none noted visually  Perineal descent - moderately limited  External clock - moderate tenderness, randolph over engorged tissues and more posteriorly over transverse perineal   Contraction voluntary/reflexive - NT  Relaxation voluntary/bear down - NT    Internal Assessment  Introitus - WFL, engorged tissue in labia  Vaginal vault - [facility]  Internal clock   Superficial - severe tenderness and moderate tightness at 4-6 o'clock, scar tissue address during previous bout of PF PT   Middle/Lateral /Deep - moderate to severe tightness and tenderness noted, L>R    Prolapse Test - NT  Quality of contraction - NT due to tightness in PF muscles and addressing concerns of sciatic pain and correlating symptoms  Coordination -NT    PERF score  - NT due to tightness in PF muscles and addressing concerns of sciatic pain and correlating symptoms - will work more on relaxation and stretching of tissues in preparation for upcoming delivery  Power  Endurance  Repetitions  Quick Flicks                         [x] Patient history, allergies, meds reviewed. Medical chart reviewed. See intake form. Review Of Systems (ROS):  [x]Performed Review of systems (Integumentary, CardioPulmonary, Neurological) by intake and observation. Intake form has been scanned into medical record. Patient has been instructed to contact their primary care physician regarding ROS issues if not already being addressed at this time.       Co-morbidities/Complexities (which will affect course of rehabilitation):   []None        [x]Hx of COVID - 11/2021   Arthritic conditions   []Rheumatoid arthritis (M05.9)  []Osteoarthritis (M19.91)  []Gout   Cardiovascular conditions   [x]Hypertension (I10) - after last delivery   []Hyperlipidemia (E78.5)  []Angina pectoris (I20)  []Atherosclerosis (I70)  []Pacemaker  []Hx of CABG/stent/  cardiac surgeries   Musculoskeletal conditions   []Disc pathology   []Congenital spine pathologies   []Osteoporosis (M81.8)  []Osteopenia (M85.8)  []Scoliosis  X chiari syndrome - surgical decompression 2007     Endocrine conditions   []Hypothyroid (E03.9)  []Hyperthyroid Gastrointestinal conditions   [x]Constipation (K59.00) - chronic   Metabolic conditions   []Morbid obesity (E66.01)  []Diabetes type 1(E10.65) or 2 (E11.65)   []Neuropathy (G60.9)     Cardio/Pulmonary conditions   []Asthma (J45)  []Coughing   []COPD (J44.9)  []CHF  []A-fib   Psychological Disorders  [x]Anxiety (F41.9) - has not taken between pregnancies  []Depression (F32.9)   []Other:   Developmental Disorders  []Autism (F84.0)  []CP (G80)  []Down Syndrome (Q90.9)  []Developmental delay     Neurological conditions  []Prior Stroke (I69.30)  []Parkinson's (G20)  []Encephalopathy (G93.40)  []MS (G35)  []Post-polio (G14)  []SCI  []TBI  []ALS Other conditions  []Fibromyalgia (M79.7)  []Vertigo  []Syncope  []Kidney Failure  []Cancer      []currently undergoing                treatment  [x]Pregnancy  [x]Incontinence   Prior surgeries  []involved limb  []previous spinal surgery  [] section birth  []hysterectomy  []bowel / bladder surgery  []other relevant surgeries   []Other:              Barriers to/and or personal factors that will affect rehab potential:              [x]Age  [x]Sex   []Smoker              []Motivation/Lack of Motivation                        [x]Co-Morbidities              []Cognitive Function, education/learning barriers              []Environmental, home barriers              []profession/work barriers  [x]past PT/medical experience  []other:  Justification: Patient demonstrates interest in and motivation for maximizing potential for improved PF muscle and bladder control. Falls Risk Assessment (30 days):   [x] Falls Risk assessed and no intervention required. [] Falls Risk assessed and Patient requires intervention due to being higher risk   TUG score (>12s at risk):     [] Falls education provided, including         ASSESSMENT:    Patient presents to PF PT with complaints of sciatic pain in L buttock and radiates to back of L knee starting in , also feeling heaviness and aching - dull-  in pelvic area. Reports that improved vaginal wound after last delivery, able to have intercourse and got pregnant without fertility. Patient reports that vaginal tissue around scar has gotten tighter as her sciatic pain has worsened, and therefore vaginal penetration is no longer as comfortable.    Patient admittedly has anxiety/concern over the upcoming delivery of her child given the issues that she experienced during and after after the delivery of her first child, who is now only 12 months old. Patient reports that her vaginal scar improved significantly with PF PT in the past, but that new issues have arisen that she wants to address prior to and in preparation for the delivery of her second child. Pain - sciatic pain 7/10, pelvic ache 5-8/10, worsens towards end of day  Severity of problem - 7/10  Patient's goal: figure out exercises that she can on her own to relieve the pain, to better prepare for upcoming birth process    After reviewing bowel/bladder behavioral modification information, PT used a pelvic floor model to orient the patient with her pelvic organ and pelvic floor muscles anatomy. Patient verbally consented to transvaginal pelvic muscle floor muscle assessment which revealed tightness in both superficial and middle/deep PF muscles, so did not encourage PF muscle holding contractions. Patient with significantly hypersensitive area in L side of vaginal canal and in middle/lateral PF which may be related to nerve irritation within the pelvis. In addition, patient with significant tightness in bilateral deep hip rotators/buttocks/PF likely resulting in burning patient in posterior L thigh due to irritation of sciatic nerve. Noted moderate core instability and incoordination which can significantly effect systematic control of intraabdominal pressures subsequently challenge PF muscle control during functional activities, randolph with expanding abdomen with advanced stages of pregnancy. Instructed patient in diaphragmatic breathing for PF relaxation and overall down training and stretches for piriformis, hamstrings, and lateral thigh, as well as sciatic nerve glide.      Patient would definitely benefit from pelvic floor physical therapy for continued education on healthy bladder/bowel habits and adjustment of behavioral modifications as well as advanced activities to improve muscle control/ coordination and endurance of pelvic floor, core, and hip muscles to decrease pelvic pain due to engorgement of labial tissues, tightness in all layers of PF muscles, and tightness in deep buttocks/hip rotator muscles causing irritation to sciatic nerve.      Functional Impairments:    [x]Noted lumbar/proximal hip hypomobility  []Noted lumbosacral and/or generalized hypermobility  [x]Decreased core/proximal hip strength and neuromuscular control   [x]Decreased LE functional strength  []pelvic/sacral/spinal malalignment   [x]Increased pain with penetration  []Absent/poor control of PF contraction   [x]Absent/poor control of PF relaxation  []Decreased control of bladder  []Decreased control of bowel    Functional Activity Limitations (from functional questionnaire and intake)  [x]Reduced ability to maintain good posture and demonstrate good body mechanics with sitting, bending, and lifting  [x]Reduced ability to perform lifting, reaching, carrying tasks  []Reduced ability to control urine  []Reduced ability to control bowel movements   [x]Reduced ability to ambulate prolonged functional periods/distances/surfaces  [x]Reduced ability to squat   [x]Reduced ability to forward bend  []Reduced ability to ascend/descend stairs  [x]Reduced ability to tolerate penetration/intercourse    Participation Restrictions  []Reduced participation in self care activities  [x]Reduced participation in home management activities  [x]Reduced participation in work activities  []Reduced participation in social activities  [x]Reduced participation in sport/recreational activities    Classification/Subgrouping:  [x]signs/symptoms consistent vaginismus/dyspareunia    []signs/symptoms consistent with pelvic floor organ prolapse  []signs/symptoms consistent with stress urinary incontinence  []signs/symptoms consistent with urge urinary incontinence  []signs/symptoms consistent with bowel incontinence  [x]signs/symptoms consistent with post-surgical/post-vaginal delivery status including decreased ROM, strength and function - hypersensitivity in scar tissue in most superficial layers of PF   [x]signs/symptoms consistent with other:  Sciatic nerve irritation/compression      Prognosis/Rehab Potential:      []Excellent   [x]Good    []Fair   []Poor    Tolerance of evaluation/treatment:    []Excellent   [x]Good    []Fair   []Poor    Physical Therapy Evaluation Complexity Justification  [x] A history of present problem with:  [] no personal factors and/or comorbidities that impact the plan of care;  []1-2 personal factors and/or comorbidities that impact the plan of care  [x]3 personal factors and/or comorbidities that impact the plan of care  [x] An examination of body systems using standardized tests and measures addressing any of the following: body structures and functions (impairments), activity limitations, and/or participation restrictions;:  [] a total of 1-2 or more elements   [x] a total of 3 or more elements   [] a total of 4 or more elements   [x] A clinical presentation with:  [] stable and/or uncomplicated characteristics   [x] evolving clinical presentation with changing characteristics  [] unstable and unpredictable characteristics;   [x] Clinical decision making of [] low, [x] moderate, [] high complexity using standardized patient assessment instrument and/or measurable assessment of functional outcome. [] EVAL (LOW) 08537 (typically 20 minutes face-to-face)  [x] EVAL (MOD) 18318 (typically 30 minutes face-to-face)  [] EVAL (HIGH) 88694 (typically 45 minutes face-to-face)  [] RE-EVAL       PLAN:   Frequency/Duration:     Recommend see patient every ~1-2 weeks initially to perform tranvaginal PF muscle assessment and intervention as deemed necessary then to ensure patient is performing exercises for pelvic floor, hip and core stability correctly.  Patient will also be educated in preparation/stretching of vaginal and surrounding tissues in preparation for vaginal delivery. Taper as appropriate, determining frequency of treatments based on progress, for a total of no more than~8-10 sessions. Next scheduled appointment is on 6/14 at 4PM.    Interventions:  [x]  Therapeutic exercise including: strength training, ROM, and functional mobility for joint, spine, core, and pelvic floor   [x]  NMR activation and proprioception for abdominals, pelvic floor musculature activation and coordination, and posture retraining   [x]  Manual therapy as indicated for spine, ribs, soft tissue, and pelvic floor to include: IASTM with or without dilator, STM, PROM, Gr I-IV mobilizations   [x] Modalities as needed that may include: E-stim, Biofeedback as indicated  [x] Patient education on pelvic floor anatomy and function, bladder and bowel anatomy and function, joint protection, postural re-education, activity modification, progression of HEP.        Treatment Interventions Implemented    Exercise/Neuromuscular Re-education - Written HEP instructions provided and reviewed    Diaphragmatic breathing - coordinating with pelvic floor muscle activities - tactile cues on stomach - cues for PF descent with inhalation - cues to avoid voluntary contraction of PF muscles and focus on natural descent/gentle stretch with inhalation    Piriformis stretch - figure four supine and then sitting - hold 8-10 breaths/~1 minute x 2 reps each LE - increased tightness noted in L LE compared to R     Hamstring stretch - supine -  - hold 8-10 breaths/~1 minute x 2 reps each LE   Sciatic nerve glide - supine 90/90 position  - hold 8-10 breaths/~1 minute x 2 reps each LE   Lateral thigh/piriformis stretch - one leg crossed over the other in supine -   hold for 1 minute = 10 slow, deep breaths s 2-3 reps  Issued forms since patient is familiar with from previous bout of PF PT - Yoga for PF reaxation - inverted positions with feel on wall with streches for BLEs - hold each of 7-8 poses/positions for 1 minute/8-10 slow, deep breaths    Manual Interventions -   Patient verbally consented to transvaginal PF muscle assessment and intervention. Superficial external myofascial release and massage to transverse perineal and external anal sphincter muscles -    External clock - moderate tenderness, randolph over engorged tissues and more posteriorly over transverse perineal   Instructed on firm support for varicosities in vaginal region with edema/engorgement of tissues making perineal area feel \"heavy\" and uncomfortable. Transvaginal myofascial release and stretching of superficial, middle, and deep layers of PF muscles with focus on areas of increased tension and tissue resistance. Internal clock             Superficial - severe tenderness and moderate tightness at 4-6 o'clock, scar tissue address during previous bout of PF PT             Middle/Lateral /Deep - moderate to severe tightness and tenderness noted, L>R    Bimanual myofascial and trigger point release simultaneously with internal/transvaginal on PF muscles and external on buttocks/later thigh, inner thigh, and abdomen to determine changes in tissue tension with slacking and stretching of the fascia. Noted mild relief with limited manual interventions. Patient Education -   Extensive education on anatomy of pelvis including PF muscles and pelvic organs. Emphasized need for stretching and down training of tight muscles and strengthening of weak muscles to promote improve muscle balance in pelvis/low back and legs. Used PF model to demonstrate transvaginal PF muscle assessment to which patient verbally consented. Patient appeared to have better understanding of current bladder/bowel issues and improved outlook on situation by end of PF PT therapy session. Familiar with PF PT from previous bout which ended in 10/2021.     Instructed on firm support for varicosities in vaginal region with edema/engorgement of tissues making perineal area feel \"heavy\" and uncomfortable. Instruct in use of ice pack and elevation of feet/legs and even posterior pelvic tilt to decrease swelling of labial tissues. Educated in mechanics of core stabilization with proper diaphragmatic breathing for improved intraabdominal pressures and therefore more balance control of pressure on PF muscles especially during functional activities. Emphasized need for rest/sleeping well to improve tissue healing. Educated patient in [place] stretching techniques to avoid pain and allow tension to release prior to continuing/progressing with a stretch. Addressed specific concerns of patient as they arose during session. Patient appeared to have a better understanding and appreciation of benefits of PF PT.     GOALS:  Patient stated goal: figure out exercises that she can on her own to relieve the pain, to better prepare for upcoming birth process  [] Progressing: [] Met: [] Not Met: [] Adjusted      Therapist goals for Patient:     Short Term Goals: To be achieved in: 4-5 sessions    1. Patient will have a decrease in pelvic heaviness and pain at site of vaginal scar and sciatic pain to indicate improvement in pelvic floor relaxation and improved tissue lengthening as well as stretches for surrounding hip/buttocks muscles. [] Progressing: [] Met: [] Not Met: [] Adjusted  2. Perform HEP for pelvic floor and core muscle groups with minimal direction from therapist as she progresses to become more independent managing her pelvic pressure and PF and surrounding core muscle weakness and/or tightness. [] Progressing: [] Met: [] Not Met: [] Adjusted    Long Term Goals: To be achieved in: 8-10 sessions    1.  Improve score of quality of life index measure, FSFI, to 20/36 or more(initial eval - 17.9) disability index to assist with reaching prior level of function and demonstrating improved quality of life with less life interruptions due to pelvic pressure/pain with vaginal penetration. [] Progressing: [] Met: [] Not Met: [] Adjusted  2. Patient will report ability to tolerate penetration without increase in pain to progress towards intercourse and medical examinations without pain or limitation. [] Progressing: [] Met: [] Not Met: [] Adjusted  3. Patient will demonstrate ability to improve flexibility and tissue stretch of vaginal tissue in preparation for upcoming vaginal delivery. [] Progressing: [] Met: [] Not Met: [] Adjusted  4. Rate severity of the problem related to pelvic and sciatic pain to 4-5/10 or less on scale of 0-10 with 0 being no problem and 10 being the worst - (7/10 reported at intial eval). [] Progressing: [] Met: [] Not Met: [] Adjusted   5. Perform HEP for pelvic floor and core muscle groups independently as she progresses to self-manage her pelvic pressure/pain and PF and surrounding core muscle weakness and/or tightness. [] Progressing: [] Met: [] Not Met: [] Adjusted     Electronically signed by:  Katlyn Weller, PT #[id number]      Time in: 1430   Time Out:1600  Total Treatment Time: 90 minutes  Coded Treatment Time: 75 minutes    Charges:  PT Evaluation - Moderate Complexity (43128) x1  Therapeutic Activity (78013) x 2    Therapeutic Exercise (52727) x1    Manual (40019) x 1    Neuromuscular Re-ed (70517)    Note: If patient does not return for scheduled/recommended follow up visits, this note will serve as a discharge from care along with the most recent update on progress.

## 2022-05-31 ENCOUNTER — HOSPITAL ENCOUNTER (OUTPATIENT)
Dept: PHYSICAL THERAPY | Age: 37
Setting detail: THERAPIES SERIES
Discharge: HOME | End: 2022-05-31
Payer: COMMERCIAL

## 2022-05-31 PROCEDURE — 97530 THERAPEUTIC ACTIVITIES: CPT | Performed by: PHYSICAL THERAPIST

## 2022-05-31 PROCEDURE — 97110 THERAPEUTIC EXERCISES: CPT | Performed by: PHYSICAL THERAPIST

## 2022-05-31 PROCEDURE — 97140 MANUAL THERAPY 1/> REGIONS: CPT | Performed by: PHYSICAL THERAPIST

## 2022-05-31 PROCEDURE — 97162 PT EVAL MOD COMPLEX 30 MIN: CPT | Performed by: PHYSICAL THERAPIST

## 2022-06-09 NOTE — PROGRESS NOTES
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    Insurance/Certification information: PT Insurance Information: [facility] PPO - 40 visits per calendar year    Physician Information:  John Ying MD    Plan of care signed (Y/N): N  Will resend eval to John Ying MD    Visit# / total visits: 2/10+     Time in: 1005  Time Out: 3275  Total Treatment Time: 60 minutes    Charges: Therapeutic Activity (54276) x 2    Therapeutic Exercise (98782) x 2    Manual (31059)    Neuromuscular Re-ed (20026)        ________________________________________________________________________________________    Pain Level: mild to moderate pain in L lower back, heaviness in perineum    SUBJECTIVE: Patient stopped doing the exercise/stretches due to pain. Reports that pain now more in lower back than buttocks. Patient reports \"heaviness, soreness, achiness\" in pelvic area after intercourse - no longer complaining of pain with initial vaginal penetration. Likely symptoms are related to pelvic vascular congestion. Reports L calf pain/spasms - discussed holding/squeezing, stretching, and hydrating properly randolph with hot, humid weather.      OBJECTIVE:      Treatment Interventions Implemented   Exercise/Neuromuscular Re-education - Written HEP instructions provided and reviewed     Diaphragmatic breathing - coordinating with pelvic floor muscle activities - tactile cues on stomach - cues for PF descent with inhalation - cues to avoid voluntary contraction of PF muscles and focus on natural descent/gentle stretch with inhalation   Reviewed per patient's request - generally instructed to  instructed to decrease intensity, back off slightly to avoid pain and focus on slow, controlled stretch with breathing  Piriformis stretch - figure four supine and then sitting - hold 8-10 breaths/~1 minute x 2 reps each LE - increased tightness noted in L LE compared to R - used pillowcase/towel to assist with pulling thigh to chest - reports increased difficulty breathing when attempted in seated position  Hamstring stretch - supine -  - hold 8-10 breaths/~1 minute x 2 reps each LE - instructed to decrease intensity, back off slightly to avoid pain and focus on slow, controlled stretch with breathing  Sciatic nerve glide - supine 90/90 position  - hold 8-10 breaths/~1 minute x 2 reps each LE   Lateral thigh/piriformis stretch - one leg crossed over the other in supine -   hold for 1 minute = 10 slow, deep breaths x 2-3 reps  Issued forms since patient is familiar with from previous bout of PF PT - Yoga for PF reaxation - inverted positions with feel on wall with streches for BLEs - hold each of 7-8 poses/positions for 1 minute/8-10 slow, deep breaths      Added -   Positioning with legs at 90/90 position for elevation with isometric contractions of gluts/PF to improve pelvic vascular congestion - suggested ice and compression as well. SI reset -  R hip flex isometric into hand with slight L bridge x 5 reps - reports improvement in symptoms at lower back/L SI joint  Calf stretches - standing - gastroc and soleus - hold  hold 8-10 breaths/~1 minute x 2 reps each LE     Manual Interventions -   Deferred to focus on exercises/stretches and positional postures to assist with symptoms likely associated with pelvic vascular congestion. Patient Education -   Extensive education on anatomy of pelvis including PF muscles and pelvic organs. Emphasized need for stretching and down training of tight muscles and strengthening of weak muscles to promote improve muscle balance in pelvis/low back and legs. Used PF model to demonstrate transvaginal PF muscle assessment to which patient verbally consented. Patient appeared to have better understanding of current bladder/bowel issues and improved outlook on situation by end of PF PT therapy session. Familiar with PF PT from previous bout which ended in 10/2021. Instructed on firm support for varicosities in vaginal region with edema/engorgement of tissues making perineal area feel \"heavy\" and uncomfortable. Instruct in use of ice pack and elevation of feet/legs and even posterior pelvic tilt to decrease swelling of labial tissues.    Reviewed need for elevation, compression, ice and gentle muscle pump to improve vascular congestion in pelvic area, noted and reportedly continuing in labia. Discussed blood pressure which tends to be low - cues to perform ankle pumps to encourage blood flow and muscle pump action, decrease pooling of blood. Educated in mechanics of core stabilization with proper diaphragmatic breathing for improved intraabdominal pressures and therefore more balance control of pressure on PF muscles especially during functional activities.     Emphasized need for rest/sleeping well to improve tissue healing.    Reviewed PROPER stretching techniques to avoid pain and allow tension to release prior to continuing/progressing with a stretch. Emphasized need to avoid pain and be less aggressive. Addressed specific concerns of patient as they arose during session. Patient appeared to have a better understanding and appreciation of benefits of PF PT.            ASSESSMENT:  Patient present with mild to moderate pain/discomfort in her L lower back, more so than her L buttocks as reported previously. Appears that patient may have been performing exercises too aggressively, so reviewed and made modifications. Patient reported pain/cramping in her L calf, so addressed with stretches and techniques to avoid and/or address cramping in her muscles. Patient continues to complaints of \"heaviness\" in her perineum/vulva which seems to be related to vascular changes, so advised on need for elevation, compression, ice, and use of isometrics for muscle pump action to decrease swelling and congestion. Patient would definitely benefit from pelvic floor physical therapy for continued education on healthy bladder/bowel habits and adjustment of behavioral modifications as well as advanced activities to improve muscle control/ coordination and endurance of pelvic floor, core, and hip muscles to decrease pelvic pain due to engorgement of labial tissues, tightness in all layers of PF muscles, and tightness in deep buttocks/hip rotator muscles causing irritation to sciatic nerve.        Treatment/Activity Tolerance:    Patient tolerated treatment well   Reports improvement in L SI/lower back after performing muscle energy/setting exercises, but needs to be cautious with movements to decrease re-aggravation of symptoms. GOALS:  Patient stated goal: figure out exercises that she can on her own to relieve the pain, to better prepare for upcoming birth process  [x]? Progressing: []? Met: []? Not Met: []? Adjusted        Therapist goals for Patient:      Short Term Goals: To be achieved in: 4-5 sessions     1. Patient will have a decrease in pelvic heaviness and pain at site of vaginal scar and sciatic pain to indicate improvement in pelvic floor relaxation and improved tissue lengthening as well as stretches for surrounding hip/buttocks muscles. []? Progressing: []? Met: []? Not Met: []? Adjusted  2. Perform HEP for pelvic floor and core muscle groups with minimal direction from therapist as she progresses to become more independent managing her pelvic pressure and PF and surrounding core muscle weakness and/or tightness. []? Progressing: []? Met: []? Not Met: []? Adjusted     Long Term Goals: To be achieved in: 8-10 sessions     1.  Improve score of quality of life index measure, FSFI, to 20/36 or more(initial eval - 17.9) disability index to assist with reaching prior level of function and demonstrating improved quality of life with less life interruptions due to pelvic pressure/pain with vaginal penetration. []? Progressing: []? Met: []? Not Met: []? Adjusted  2. Patient will report ability to tolerate penetration without increase in pain to progress towards intercourse and medical examinations without pain or limitation. []? Progressing: []? Met: []? Not Met: []? Adjusted  3. Patient will demonstrate ability to improve flexibility and tissue stretch of vaginal tissue in preparation for upcoming vaginal delivery. []? Progressing: []? Met: []? Not Met: []? Adjusted  4. Rate severity of the problem related to pelvic and sciatic pain to 4-5/10 or less on scale of 0-10 with 0 being no problem and 10 being the worst - (7/10 reported at intial eval). []? Progressing: []? Met: []? Not Met: []? Adjusted            5. Perform HEP for pelvic floor and core muscle groups independently as she progresses to self-manage her pelvic pressure/pain and PF and surrounding core muscle weakness and/or tightness. []? Progressing: []? Met: []? Not Met: []? Adjusted                           Plan:   Continue per plan of care   Address ortho issues as they arise with changing body mass. May eventually , 6-8 weeks prior to anticipated delivery, show patient self stretching techniques to prepare birth canal for vaginal delivery. Frequency/Duration:     Recommend see patient every ~2 weeks to perform tranvaginal PF muscle assessment and intervention as deemed necessary then to ensure patient is performing exercises for pelvic floor, hip and core stability correctly. Taper as appropriate, determining frequency of treatments based on progress, for a total of ~8-10 sessions.  Due to schedule conflicts, patient's next scheduled appointment is on 7/6 at [facility].  Electronically signed by Darell Crawford Glenn Kim #[id number]  on 6/16/2022 at 9:08 AM    Note: If patient does not return for scheduled/recommended follow up visits, this note will serve as a discharge from care along with the most recent update on progress.

## 2022-06-14 ENCOUNTER — HOSPITAL ENCOUNTER (OUTPATIENT)
Dept: PHYSICAL THERAPY | Age: 37
Setting detail: THERAPIES SERIES
Discharge: HOME | End: 2022-06-14
Payer: COMMERCIAL

## 2022-06-16 ENCOUNTER — HOSPITAL ENCOUNTER (OUTPATIENT)
Dept: PHYSICAL THERAPY | Age: 37
Setting detail: THERAPIES SERIES
Discharge: HOME | End: 2022-06-16
Payer: COMMERCIAL

## 2022-06-16 PROCEDURE — 97110 THERAPEUTIC EXERCISES: CPT | Performed by: PHYSICAL THERAPIST

## 2022-06-16 PROCEDURE — 97530 THERAPEUTIC ACTIVITIES: CPT | Performed by: PHYSICAL THERAPIST

## 2022-06-27 ENCOUNTER — PATIENT MESSAGE (OUTPATIENT)
Dept: DERMATOLOGY | Age: 37
End: 2022-06-27

## 2022-06-28 NOTE — TELEPHONE ENCOUNTER
From: Olive Trejo  To: Dr. Lynsey King  Sent: 6/27/2022 10:11 PM EDT  Subject: Suspicious mole    Hi Dr. Edy Luis yourluis doing well! I hate to bother you but my  has a mole on his toe that just appeared and it looks concerning. He tried to make an appointment with you but was told youre not accepting new patients. He doesnt currently have a dermatologist he sees. Hes going to find someone else to see but he just started a new job and his new insurance doesnt kick in for 30 days. Given how the mole looks, he doesnt know if he should go ahead and try and see someone right away or if its okay to wait. I know hes not your patient, just wanted to see if you could give us a professional opinion. If you cant, I completely understand. Attached are a few picture. Thanks so much!   Devon Justin

## 2022-06-30 NOTE — PROGRESS NOTES
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    Insurance/Certification information: PT Insurance Information: [facility] PPO - 40 visits per calendar year    Physician Information:  Chaka Garcia MD    Plan of care signed (Y/N): N  Will resend eval to Chaka Garcia MD    Visit# / total visits: 3/10+     Time in: 905  Time Out: 1005  Total Treatment Time: 60  minutes    Charges: Therapeutic Activity (22700) x 2    Therapeutic Exercise (68282) x 1    Manual (87506) x1    Neuromuscular Re-ed (53490)        ________________________________________________________________________________________    Pain Level: mild to moderate pain in L lower back, heaviness in perineum    SUBJECTIVE:   Patient reports that she has 7 weeks until expected delivered. SI pain is better - spouse is putting her child to bed or she is using rocking chair rather than standing to rock her child to sleep. Still feeling swelling in perineum - issued information on compression garments to assist with swelling and positioning to encourage blood return. Patient reports less interest/desire as she is tired and abdomen in growing. Patient wishes to focus on preparing tissues for upcoming labor and delivery, hopeful to not tear again.      OBJECTIVE:      Treatment Interventions Implemented   Exercise/Neuromuscular Re-education - Written HEP instructions provided and reviewed     Diaphragmatic breathing - coordinating with pelvic floor muscle activities - tactile cues on stomach - cues for PF descent with inhalation - cues to avoid voluntary contraction of PF muscles and focus on natural descent/gentle stretch with inhalation   Reviewed per patient's request - generally instructed to  instructed to decrease intensity, back off slightly to avoid pain and focus on slow, controlled stretch with breathing  Piriformis stretch - figure four supine and then sitting - hold 8-10 breaths/~1 minute x 2 reps each LE - increased tightness noted in L LE compared to R - used pillowcase/towel to assist with pulling thigh to chest - reports increased difficulty breathing when attempted in seated position  Hamstring stretch - supine -  - hold 8-10 breaths/~1 minute x 2 reps each LE - instructed to decrease intensity, back off slightly to avoid pain and focus on slow, controlled stretch with breathing  Sciatic nerve glide - supine 90/90 position  - hold 8-10 breaths/~1 minute x 2 reps each LE   Lateral thigh/piriformis stretch - one leg crossed over the other in supine -   hold for 1 minute = 10 slow, deep breaths x 2-3 reps  Issued forms since patient is familiar with from previous bout of PF PT - Yoga for PF reaxation - inverted positions with feel on wall with streches for BLEs - hold each of 7-8 poses/positions for 1 minute/8-10 slow, deep breaths    Added -   Happy baby - hold for 1 minute = 10 slow, deep breaths s 2-3 reps OR 2-3 minutes total    Positioning with legs at 90/90 position for elevation with isometric contractions of gluts/PF to improve pelvic vascular congestion - suggested ice and compression as well. SI reset -  R hip flex isometric into hand with slight L bridge x 5 reps - reports improvement in symptoms at lower back/L SI joint  Calf stretches - standing - gastroc and soleus - hold  hold 8-10 breaths/~1 minute x 2 reps each LE     Manual Interventions -     Patient verbally consented to transvaginal PF muscle assessment and intervention.    Superficial external myofascial release and massage to transverse perineal and external anal sphincter muscles -    External clock - moderate tenderness, randolph over engorged tissues and more posteriorly over transverse perineal   Instructed on firm support for varicosities in vaginal region with edema/engorgement of tissues making perineal area feel \"heavy\" and uncomfortable. Transvaginal myofascial release and stretching of superficial, middle, and deep layers of PF muscles with focus on areas of increased tension and tissue resistance. Internal clock             Superficial - moderate tenderness and tightness at 4-6 o'clock, scar tissue address during previous bout of PF PT, increased pain and tension on L side with demonstration and repeat of more sustained inferior stretch to superficial muscles after perineal massage in preparation for vaginal deliver             Middle/Lateral /Deep - moderate to severe tightness and tenderness noted on L - improved with rocking of L hip IR/ER with fascial mobilization during sweeping internally - focus on L side     Bimanual myofascial and trigger point release simultaneously with internal/transvaginal on PF muscles and external on buttocks/later thigh, inner thigh, and abdomen to determine changes in tissue tension with slacking and stretching of the fascia.   Noted moderated relief with manual interventions. Patient Education -   Extensive education on anatomy of pelvis including PF muscles and pelvic organs. Emphasized need for stretching and down training of tight muscles and strengthening of weak muscles to promote improve muscle balance in pelvis/low back and legs. Used PF model to demonstrate transvaginal PF muscle assessment to which patient verbally consented. Patient appeared to have better understanding of current bladder/bowel issues and improved outlook on situation by end of PF PT therapy session. Familiar with PF PT from previous bout which ended in 10/2021. Instructed on firm support for varicosities in vaginal region with edema/engorgement of tissues making perineal area feel \"heavy\" and uncomfortable. Reviewed use of ice pack and elevation of feet/legs and even posterior pelvic tilt to decrease swelling of labial tissues.    Reviewed need for elevation, compression, ice and gentle muscle pump to improve vascular congestion in pelvic area, noted and reportedly continuing in labia. Issued information on compression garment that may be helpful. Discussed blood pressure which tends to be low - cues to perform ankle pumps to encourage blood flow and muscle pump action, decrease pooling of blood. Educated in mechanics of core stabilization with proper diaphragmatic breathing for improved intraabdominal pressures and therefore more balance control of pressure on PF muscles especially during functional activities.     Emphasized need for rest/sleeping well to improve tissue healing.    Reviewed PROPER stretching techniques to avoid pain and allow tension to release prior to continuing/progressing with a stretch. Emphasized need to avoid pain and be less aggressive. Issued and reviewed handout on perineal massage in preparation for vaginal delivery. Patient demonstrates anxiety centered about the birthing experience, attempted to allay fears and instruct in coping mechanisms. Addressed specific concerns of patient as they arose during session. Patient appeared to have a better understanding and appreciation of benefits of PF PT.        ASSESSMENT:  Patient reports that pain/discomfort in her L lower back has resolved with some changes in her body mechanics, stretches, and allowing her spouse to help more with putting their child to bed. Patient reported pain/cramping in her L calf has also resolved with stretching consistently. Patient continues to complaints of \"heaviness\" in her perineum/vulva which seems to be related to vascular changes, so again advised on need for elevation, compression, ice, and use of isometrics for muscle pump action to decrease swelling and congestion.  Issued information on compression garment that may be useful. As patient is approaching anticipated delivery date, she wishes to focus PF PT treatment interventions on preparing the tissues that will need to stretch during vaginal delivery. Instructed patient on how she should begin some gentle massage and stretch on her perineum and demonstrated techniques. Patient with increased tightness and hypersensitivity on L side with did decrease with transvaginal manual interventions. Patient would definitely benefit from continued pelvic floor physical therapy to advise in adjustment of behavioral modifications as well as advanced activities to prepare vaginal tissues for anticipated delivery and to decrease pelvic pain due to engorgement of labial tissues, tightness in all layers of PF muscles, and tightness in deep buttocks/hip rotator muscles causing irritation to sciatic nerve.        Treatment/Activity Tolerance:    Patient tolerated treatment well   Reports improvement in L SI/lower back after performing muscle energy/setting exercises, but needs to be cautious with movements to decrease re-aggravation of symptoms. Has modified her activities to decrease risk of re-injury. Demonstrates anxiety centered about the birthing experience, attempted to allay fears and instruct in coping mechanisms. Decrease tension and tenderness noted in L PF muscles after manual interventions. GOALS:  Patient stated goal: figure out exercises that she can on her own to relieve the pain, to better prepare for upcoming birth process  [x]? Progressing: []? Met: []? Not Met: []? Adjusted        Therapist goals for Patient:      Short Term Goals: To be achieved in: 4-5 sessions     1. Patient will have a decrease in pelvic heaviness and pain at site of vaginal scar and sciatic pain to indicate improvement in pelvic floor relaxation and improved tissue lengthening as well as stretches for surrounding hip/buttocks muscles. [x]?  Progressing: []? Met: []? Not Met: []? Adjusted  2. Perform HEP for pelvic floor and core muscle groups with minimal direction from therapist as she progresses to become more independent managing her pelvic pressure and PF and surrounding core muscle weakness and/or tightness. [x]? Progressing: []? Met: []? Not Met: []? Adjusted     Long Term Goals: To be achieved in: 8-10 sessions     1. Improve score of quality of life index measure, FSFI, to 20/36 or more(initial eval - 17.9) disability index to assist with reaching prior level of function and demonstrating improved quality of life with less life interruptions due to pelvic pressure/pain with vaginal penetration. []? Progressing: []? Met: []? Not Met: []? Adjusted  2. Patient will report ability to tolerate penetration without increase in pain to progress towards intercourse and medical examinations without pain or limitation. []? Progressing: []? Met: []? Not Met: []? Adjusted  3. Patient will demonstrate ability to improve flexibility and tissue stretch of vaginal tissue in preparation for upcoming vaginal delivery. []? Progressing: []? Met: []? Not Met: []? Adjusted  4. Rate severity of the problem related to pelvic and sciatic pain to 4-5/10 or less on scale of 0-10 with 0 being no problem and 10 being the worst - (7/10 reported at intial eval). []? Progressing: []? Met: []? Not Met: []? Adjusted            5. Perform HEP for pelvic floor and core muscle groups independently as she progresses to self-manage her pelvic pressure/pain and PF and surrounding core muscle weakness and/or tightness. []? Progressing: []? Met: []? Not Met: []? Adjusted                           Plan:   Continue per plan of care   Address ortho issues as they arise with changing body mass. Review with patient self stretching techniques to prepare birth canal for vaginal delivery.      Frequency/Duration:     Recommend see patient every ~2 weeks to perform tranvaginal PF muscle assessment and intervention as deemed necessary then to ensure patient is performing exercises for pelvic floor, hip and core stability correctly. Taper as appropriate, determining frequency of treatments based on progress, for a total of ~8-10 sessions. Patient's next scheduled appointment is on 7/19 at [place] [id number].  Electronically signed by Keshia Ojeda, PT #[id number]  on 7/6/2022 at 7:57 AM    Note: If patient does not return for scheduled/recommended follow up visits, this note will serve as a discharge from care along with the most recent update on progress.

## 2022-07-06 ENCOUNTER — HOSPITAL ENCOUNTER (OUTPATIENT)
Dept: PHYSICAL THERAPY | Age: 37
Setting detail: THERAPIES SERIES
Discharge: HOME | End: 2022-07-06
Payer: COMMERCIAL

## 2022-07-06 PROCEDURE — 97530 THERAPEUTIC ACTIVITIES: CPT | Performed by: PHYSICAL THERAPIST

## 2022-07-06 PROCEDURE — 97110 THERAPEUTIC EXERCISES: CPT | Performed by: PHYSICAL THERAPIST

## 2022-07-06 PROCEDURE — 97140 MANUAL THERAPY 1/> REGIONS: CPT | Performed by: PHYSICAL THERAPIST

## 2022-07-12 NOTE — PROGRESS NOTES
[facility], [address] [address]  Phone: [phone]  Fax: [phone]        Physical Therapy Daily Treatment Note    Date: 2022    Patient Name: Kendal Trujillo : 1985 MRN: [id number]    Restrictions/Precautions:    Medical/Treatment Diagnosis Information:    · Diagnosis: O90.1 - Disruption of perineal obstetric wound    Insurance/Certification information: PT Insurance Information: [facility] PPO - 40 visits per calendar year    Physician Information:  Kayla Mas MD    Plan of care signed (Y/N): N  Will resend eval to Kayla Mas MD    Visit# / total visits: 4/10+     Time in: 1000  Time Out: 1100  Total Treatment Time: 60  minutes    Charges: Therapeutic Activity (14226) x 2    Therapeutic Exercise (50513) - NA    Manual (65312) x2    Neuromuscular Re-ed (15429) - NA        ________________________________________________________________________________________    Pain Level: no specific complaints    SUBJECTIVE:   Patient reports that she has 5 weeks until expected delivered - due  - was 2 weeks early with first pregnancy. SI pain continues to be better overall - spouse is putting her child to bed or she is using rocking chair rather than standing to rock her child to sleep. Performing happy baby stretch 1-2 times/day. Not walking for exercises since increases discomfort in SI/lower back. Focus on on internal stretching to prepare for birthing experience. Feels less swelling in perineum - using positional options including elevating legs on pillow while sleeping. Patient continues to wish to focus PF PT on preparing tissues for upcoming labor and delivery, hopeful to not tear again.      OBJECTIVE:      Treatment Interventions Implemented   Exercise/Neuromuscular Re-education - Written HEP instructions provided and reviewed     Diaphragmatic breathing - coordinating with pelvic floor muscle activities - tactile cues on stomach - cues for PF descent with inhalation - cues to avoid voluntary contraction of PF muscles and focus on natural descent/gentle stretch with inhalation   Reviewed per patient's request - generally instructed to  instructed to decrease intensity, back off slightly to avoid pain and focus on slow, controlled stretch with breathing  Piriformis stretch - figure four supine and then sitting - hold 8-10 breaths/~1 minute x 2 reps each LE - increased tightness noted in L LE compared to R - used pillowcase/towel to assist with pulling thigh to chest - reports increased difficulty breathing when attempted in seated position  Hamstring stretch - supine -  - hold 8-10 breaths/~1 minute x 2 reps each LE - instructed to decrease intensity, back off slightly to avoid pain and focus on slow, controlled stretch with breathing  Sciatic nerve glide - supine 90/90 position  - hold 8-10 breaths/~1 minute x 2 reps each LE   Lateral thigh/piriformis stretch - one leg crossed over the other in supine -   hold for 1 minute = 10 slow, deep breaths x 2-3 reps  Issued forms since patient is familiar with from previous bout of PF PT - Yoga for PF reaxation - inverted positions with feel on wall with streches for BLEs - hold each of 7-8 poses/positions for 1 minute/8-10 slow, deep breaths     Happy baby - hold for 1 minute = 10 slow, deep breaths s 2-3 reps OR 2-3 minutes total - patient reports performing at least once a day    Positioning with legs at 90/90 position for elevation with isometric contractions of gluts/PF to improve pelvic vascular congestion - suggested ice and compression as well.   - patient reports consistently elevating hips/pelvis with pillow at night when sleeping, feels that swelling is less prominent  SI reset -  R hip flex isometric into hand with slight L bridge x 5 reps - reports improvement in symptoms at lower back/L SI joint  Calf stretches - standing - gastroc and soleus - hold  hold 8-10 breaths/~1 minute x 2 reps each LE     Manual Interventions -   Hip adductor massage and myofascial release - noted mild to moderate tightness and tenderness. Patient verbally consented to transvaginal PF muscle assessment and intervention. Provided firm support/intermittent compression for varicosities in vaginal region with edema/engorgement of tissues making perineal area feel \"heavy\" and uncomfortable. Transvaginal myofascial release and stretching of superficial, middle, and deep layers of PF muscles with focus on areas of increased tension and tissue resistance. As pregnancy is progressing, focus on superficial muscle layer to prepare tissues for stretch required for vaginal birth. Internal clock             Superficial - mild to moderate tenderness and tightness - pill rolling and ironing throughout tissue layer 1-11 o'clock with increased tissue tightness and hypersensitivity at 3 and 9 o'clock - improved with gentle massage and simultaneous myofascial release in lower abdomen and upper thigh  Instructed patient and how she can have spouse provide superficially myofascial release and/or support with upward and outward stretching of tissue of lower abdomen. Sustained downward pressure with two fingers at posterior introitus to simulate tissue stretching during birthing. Patient tolerated with mild to moderate discomfort and occasional reflexive contraction of PF muscles. Patient was aware of and was able to use breathing to help with relaxation of muscles. Noted moderate relief and decreased hypersensitivity of superficial layers of PF muscles and tissues with manual interventions. Patient Education -   Extensive education on anatomy of pelvis including PF muscles and pelvic organs. Emphasized need for stretching and down training of tight muscles and strengthening of weak muscles to promote improve muscle balance in pelvis/low back and legs.  Used PF model to demonstrate transvaginal PF muscle assessment to which patient verbally consented. Patient appeared to have better understanding of current bladder/bowel issues and improved outlook on situation by end of PF PT therapy session. Familiar with PF PT from previous bout which ended in 10/2021. Instructed on firm support for varicosities in vaginal region with edema/engorgement of tissues making perineal area feel \"heavy\" and uncomfortable. Reviewed use of ice pack and elevation of feet/legs and even posterior pelvic tilt to decrease swelling of labial tissues. Reviewed need for elevation, compression, ice and gentle muscle pump to improve vascular congestion in pelvic area, noted and reportedly continuing in labia. Issued information on compression garment that may be helpful. Discussed blood pressure which tends to be low - cues to perform ankle pumps to encourage blood flow and muscle pump action, decrease pooling of blood. Educated in mechanics of core stabilization with proper diaphragmatic breathing for improved intraabdominal pressures and therefore more balance control of pressure on PF muscles especially during functional activities. Emphasized need for rest/sleeping well to improve tissue healing. Reviewed PROPER stretching techniques to avoid pain and allow tension to release prior to continuing/progressing with a stretch. Emphasized need to avoid pain and be less aggressive. Issued and reviewed handout on perineal massage in preparation for vaginal delivery. Patient demonstrates slightly less  anxiety centered about the birthing experience, continue to emphasized need to focus on relaxation techniques and manually preparing the tissues for vaginal delivery. Educated in need to explore and document different positions which she may try during the labor and birthing process. Reviewed need for visual imagery and breathing techniques which may be helpful during her labor.      Addressed specific concerns of patient as they arose during session. Patient appeared to have a better understanding and appreciation of benefits of PF PT.        ASSESSMENT:  Patient reports that pain/discomfort in her L lower back has not been a problem since she has changed her body mechanics, is walking for exercise less, and is allowing her spouse to help more with childcare activities. Patient complains of less \"heaviness\" in her perineum/vulva related to vascular changes, since more regularly using elevation, ice, and isometrics for muscle pump action to decrease swelling and congestion. As patient is approaching anticipated delivery date, she wishes to focus PF PT treatment interventions on preparing the tissues that will need to stretch during vaginal delivery. Focus on manual interventions was on superficial muscle and tissue layer of PF in preparation for vaginal birth process. Patient with increased tightness and hypersensitivity bilaterally which did decrease with transvaginal manual interventions. Patient would definitely benefit from continued pelvic floor physical therapy to advise in adjustment of behavioral modifications as well as advanced activities to prepare vaginal tissues for anticipated delivery and to decrease pelvic pain due to engorgement of labial tissues, tightness in all layers of PF muscles, and tightness in deep buttocks/hip rotator muscles causing irritation to sciatic nerve. Treatment/Activity Tolerance:    Patient tolerated treatment well   Reports improvement in L SI/lower back after performing muscle energy/setting exercises, but needs to be cautious with movements to decrease re-aggravation of symptoms. Has modified her activities to decrease risk of re-injury. Demonstrates continued but slightly less anxiety centered about the birthing experience, attempted to allay fears and instruct in coping mechanisms. Decrease tension and tenderness noted in PF muscles after manual interventions. GOALS:  Patient stated goal: figure out exercises that she can on her own to relieve the pain, to better prepare for upcoming birth process  [x] Progressing: [] Met: [] Not Met: [] Adjusted        Therapist goals for Patient:      Short Term Goals: To be achieved in: 4-5 sessions     1. Patient will have a decrease in pelvic heaviness and pain at site of vaginal scar and sciatic pain to indicate improvement in pelvic floor relaxation and improved tissue lengthening as well as stretches for surrounding hip/buttocks muscles. [] Progressing: [x] Met: [] Not Met: [] Adjusted  2. Perform HEP for pelvic floor and core muscle groups with minimal direction from therapist as she progresses to become more independent managing her pelvic pressure and PF and surrounding core muscle weakness and/or tightness. [] Progressing: [x] Met: [] Not Met: [] Adjusted     Long Term Goals: To be achieved in: 8-10 sessions     1. Improve score of quality of life index measure, FSFI, to 20/36 or more(initial eval - 17.9) disability index to assist with reaching prior level of function and demonstrating improved quality of life with less life interruptions due to pelvic pressure/pain with vaginal penetration. [] Progressing: [] Met: [] Not Met: [] Adjusted  2. Patient will report ability to tolerate penetration without increase in pain to progress towards intercourse and medical examinations without pain or limitation. [] Progressing: [] Met: [] Not Met: [] Adjusted  3. Patient will demonstrate ability to improve flexibility and tissue stretch of vaginal tissue in preparation for upcoming vaginal delivery. [x] Progressing: [] Met: [] Not Met: [] Adjusted  4. Rate severity of the problem related to pelvic and sciatic pain to 4-5/10 or less on scale of 0-10 with 0 being no problem and 10 being the worst - (7/10 reported at intial eval).   [x] Progressing: [] Met: [] Not Met: [] Adjusted            5. Perform HEP for pelvic floor and core muscle groups independently as she progresses to self-manage her pelvic pressure/pain and PF and surrounding core muscle weakness and/or tightness. [] Progressing: [] Met: [] Not Met: [] Adjusted                           Plan:   Continue per plan of care   Address ortho issues as they arise with changing body mass. Review with patient self stretching techniques  and techniques that her partner may be able to help her with to prepare birth canal for vaginal delivery. Frequency/Duration:     Patient is approaching delivery date of 8/25/22. Will continue to benefit from PF PT for manual interventions to continue to prepare tissues of PF for anticipated vaginal birth. Patient's next scheduled appointment is on 8/2 at [facility].  Electronically signed by Gaby Najera, PT #[id number]  on 7/19/2022 at 5:51 PM    Note: If patient does not return for scheduled/recommended follow up visits, this note will serve as a discharge from care along with the most recent update on progress.

## 2022-07-19 ENCOUNTER — HOSPITAL ENCOUNTER (OUTPATIENT)
Dept: PHYSICAL THERAPY | Age: 37
Setting detail: THERAPIES SERIES
Discharge: HOME | End: 2022-07-19
Payer: COMMERCIAL

## 2022-07-19 PROCEDURE — 97530 THERAPEUTIC ACTIVITIES: CPT | Performed by: PHYSICAL THERAPIST

## 2022-07-19 PROCEDURE — 97140 MANUAL THERAPY 1/> REGIONS: CPT | Performed by: PHYSICAL THERAPIST

## 2022-08-02 ENCOUNTER — HOSPITAL ENCOUNTER (OUTPATIENT)
Dept: PHYSICAL THERAPY | Age: 37
Setting detail: THERAPIES SERIES
Discharge: HOME | End: 2022-08-02

## 2022-08-12 ENCOUNTER — HOSPITAL ENCOUNTER (INPATIENT)
Age: 37
LOS: 2 days | Discharge: HOME | End: 2022-08-14
Attending: OBSTETRICS & GYNECOLOGY | Admitting: OBSTETRICS & GYNECOLOGY
Payer: COMMERCIAL

## 2022-08-12 PROBLEM — Z34.90 TERM PREGNANCY: Status: ACTIVE | Noted: 2022-08-12

## 2022-08-12 LAB
ABO/RH: NORMAL
ANTIBODY SCREEN: NORMAL
BASOPHILS ABSOLUTE: 0 K/UL (ref 0–0.2)
BASOPHILS RELATIVE PERCENT: 0.2 %
EOSINOPHILS ABSOLUTE: 0.1 K/UL (ref 0–0.6)
EOSINOPHILS RELATIVE PERCENT: 0.7 %
HCT VFR BLD CALC: 34.6 % (ref 36–48)
HEMOGLOBIN: 12.1 G/DL (ref 12–16)
LYMPHOCYTES ABSOLUTE: 3.4 K/UL (ref 1–5.1)
LYMPHOCYTES RELATIVE PERCENT: 25.7 %
MCH RBC QN AUTO: 32 PG (ref 26–34)
MCHC RBC AUTO-ENTMCNC: 35 G/DL (ref 31–36)
MCV RBC AUTO: 91.3 FL (ref 80–100)
MONOCYTES ABSOLUTE: 1 K/UL (ref 0–1.3)
MONOCYTES RELATIVE PERCENT: 7.5 %
NEUTROPHILS ABSOLUTE: 8.7 K/UL (ref 1.7–7.7)
NEUTROPHILS RELATIVE PERCENT: 65.9 %
PDW BLD-RTO: 12.8 % (ref 12.4–15.4)
PLATELET # BLD: 279 K/UL (ref 135–450)
PMV BLD AUTO: 7.7 FL (ref 5–10.5)
RBC # BLD: 3.78 M/UL (ref 4–5.2)
TOTAL SYPHILLIS IGG/IGM: NORMAL
WBC # BLD: 13.2 K/UL (ref 4–11)

## 2022-08-12 PROCEDURE — 86780 TREPONEMA PALLIDUM: CPT

## 2022-08-12 PROCEDURE — 86850 RBC ANTIBODY SCREEN: CPT

## 2022-08-12 PROCEDURE — 6360000002 HC RX W HCPCS: Performed by: OBSTETRICS & GYNECOLOGY

## 2022-08-12 PROCEDURE — 6370000000 HC RX 637 (ALT 250 FOR IP): Performed by: OBSTETRICS & GYNECOLOGY

## 2022-08-12 PROCEDURE — 86900 BLOOD TYPING SEROLOGIC ABO: CPT

## 2022-08-12 PROCEDURE — 2500000003 HC RX 250 WO HCPCS: Performed by: OBSTETRICS & GYNECOLOGY

## 2022-08-12 PROCEDURE — 85025 COMPLETE CBC W/AUTO DIFF WBC: CPT

## 2022-08-12 PROCEDURE — 86901 BLOOD TYPING SEROLOGIC RH(D): CPT

## 2022-08-12 PROCEDURE — 0KQM0ZZ REPAIR PERINEUM MUSCLE, OPEN APPROACH: ICD-10-PCS | Performed by: OBSTETRICS & GYNECOLOGY

## 2022-08-12 PROCEDURE — 1220000000 HC SEMI PRIVATE OB R&B

## 2022-08-12 PROCEDURE — 36415 COLL VENOUS BLD VENIPUNCTURE: CPT

## 2022-08-12 PROCEDURE — 7200000001 HC VAGINAL DELIVERY

## 2022-08-12 PROCEDURE — 10907ZC DRAINAGE OF AMNIOTIC FLUID, THERAPEUTIC FROM PRODUCTS OF CONCEPTION, VIA NATURAL OR ARTIFICIAL OPENING: ICD-10-PCS | Performed by: OBSTETRICS & GYNECOLOGY

## 2022-08-12 RX ORDER — DOCUSATE SODIUM 100 MG/1
100 CAPSULE, LIQUID FILLED ORAL 2 TIMES DAILY
Status: DISCONTINUED | OUTPATIENT
Start: 2022-08-12 | End: 2022-08-14 | Stop reason: HOSPADM

## 2022-08-12 RX ORDER — SODIUM CHLORIDE 0.9 % (FLUSH) 0.9 %
5-40 SYRINGE (ML) INJECTION EVERY 12 HOURS SCHEDULED
Status: DISCONTINUED | OUTPATIENT
Start: 2022-08-12 | End: 2022-08-12

## 2022-08-12 RX ORDER — SODIUM CHLORIDE, SODIUM LACTATE, POTASSIUM CHLORIDE, AND CALCIUM CHLORIDE .6; .31; .03; .02 G/100ML; G/100ML; G/100ML; G/100ML
1000 INJECTION, SOLUTION INTRAVENOUS PRN
Status: DISCONTINUED | OUTPATIENT
Start: 2022-08-12 | End: 2022-08-12

## 2022-08-12 RX ORDER — SODIUM CHLORIDE 0.9 % (FLUSH) 0.9 %
5-40 SYRINGE (ML) INJECTION EVERY 12 HOURS SCHEDULED
Status: DISCONTINUED | OUTPATIENT
Start: 2022-08-12 | End: 2022-08-14 | Stop reason: HOSPADM

## 2022-08-12 RX ORDER — ACETAMINOPHEN 325 MG/1
650 TABLET ORAL EVERY 4 HOURS PRN
Status: DISCONTINUED | OUTPATIENT
Start: 2022-08-12 | End: 2022-08-12

## 2022-08-12 RX ORDER — SODIUM CHLORIDE 9 MG/ML
25 INJECTION, SOLUTION INTRAVENOUS PRN
Status: DISCONTINUED | OUTPATIENT
Start: 2022-08-12 | End: 2022-08-12

## 2022-08-12 RX ORDER — CARBOPROST TROMETHAMINE 250 UG/ML
250 INJECTION, SOLUTION INTRAMUSCULAR PRN
Status: DISCONTINUED | OUTPATIENT
Start: 2022-08-12 | End: 2022-08-12

## 2022-08-12 RX ORDER — MORPHINE SULFATE 4 MG/ML
4 INJECTION, SOLUTION INTRAMUSCULAR; INTRAVENOUS ONCE
Status: COMPLETED | OUTPATIENT
Start: 2022-08-12 | End: 2022-08-12

## 2022-08-12 RX ORDER — SODIUM CHLORIDE 0.9 % (FLUSH) 0.9 %
5-40 SYRINGE (ML) INJECTION PRN
Status: DISCONTINUED | OUTPATIENT
Start: 2022-08-12 | End: 2022-08-14 | Stop reason: HOSPADM

## 2022-08-12 RX ORDER — ACETAMINOPHEN 500 MG
1000 TABLET ORAL EVERY 8 HOURS PRN
Status: DISCONTINUED | OUTPATIENT
Start: 2022-08-12 | End: 2022-08-14 | Stop reason: HOSPADM

## 2022-08-12 RX ORDER — METHYLERGONOVINE MALEATE 0.2 MG/ML
200 INJECTION INTRAVENOUS PRN
Status: DISCONTINUED | OUTPATIENT
Start: 2022-08-12 | End: 2022-08-12

## 2022-08-12 RX ORDER — LIDOCAINE HYDROCHLORIDE 10 MG/ML
INJECTION, SOLUTION EPIDURAL; INFILTRATION; INTRACAUDAL; PERINEURAL
Status: COMPLETED
Start: 2022-08-12 | End: 2022-08-12

## 2022-08-12 RX ORDER — SODIUM CHLORIDE 9 MG/ML
INJECTION, SOLUTION INTRAVENOUS PRN
Status: DISCONTINUED | OUTPATIENT
Start: 2022-08-12 | End: 2022-08-14 | Stop reason: HOSPADM

## 2022-08-12 RX ORDER — ONDANSETRON 2 MG/ML
4 INJECTION INTRAMUSCULAR; INTRAVENOUS EVERY 6 HOURS PRN
Status: DISCONTINUED | OUTPATIENT
Start: 2022-08-12 | End: 2022-08-12

## 2022-08-12 RX ORDER — SODIUM CHLORIDE, SODIUM LACTATE, POTASSIUM CHLORIDE, CALCIUM CHLORIDE 600; 310; 30; 20 MG/100ML; MG/100ML; MG/100ML; MG/100ML
INJECTION, SOLUTION INTRAVENOUS CONTINUOUS
Status: DISCONTINUED | OUTPATIENT
Start: 2022-08-12 | End: 2022-08-12

## 2022-08-12 RX ORDER — OXYCODONE HYDROCHLORIDE 5 MG/1
5 TABLET ORAL EVERY 4 HOURS PRN
Status: DISCONTINUED | OUTPATIENT
Start: 2022-08-12 | End: 2022-08-14 | Stop reason: HOSPADM

## 2022-08-12 RX ORDER — SODIUM CHLORIDE, SODIUM LACTATE, POTASSIUM CHLORIDE, AND CALCIUM CHLORIDE .6; .31; .03; .02 G/100ML; G/100ML; G/100ML; G/100ML
500 INJECTION, SOLUTION INTRAVENOUS PRN
Status: DISCONTINUED | OUTPATIENT
Start: 2022-08-12 | End: 2022-08-12

## 2022-08-12 RX ORDER — IBUPROFEN 800 MG/1
800 TABLET ORAL EVERY 8 HOURS PRN
Status: DISCONTINUED | OUTPATIENT
Start: 2022-08-12 | End: 2022-08-14 | Stop reason: HOSPADM

## 2022-08-12 RX ORDER — LANOLIN 100 %
OINTMENT (GRAM) TOPICAL PRN
Status: DISCONTINUED | OUTPATIENT
Start: 2022-08-12 | End: 2022-08-14 | Stop reason: HOSPADM

## 2022-08-12 RX ORDER — OXYCODONE HYDROCHLORIDE 10 MG/1
10 TABLET ORAL EVERY 4 HOURS PRN
Status: DISCONTINUED | OUTPATIENT
Start: 2022-08-12 | End: 2022-08-14 | Stop reason: HOSPADM

## 2022-08-12 RX ORDER — MISOPROSTOL 200 UG/1
800 TABLET ORAL PRN
Status: DISCONTINUED | OUTPATIENT
Start: 2022-08-12 | End: 2022-08-12

## 2022-08-12 RX ORDER — SODIUM CHLORIDE 0.9 % (FLUSH) 0.9 %
5-40 SYRINGE (ML) INJECTION PRN
Status: DISCONTINUED | OUTPATIENT
Start: 2022-08-12 | End: 2022-08-12

## 2022-08-12 RX ORDER — MORPHINE SULFATE 2 MG/ML
INJECTION, SOLUTION INTRAMUSCULAR; INTRAVENOUS
Status: DISPENSED
Start: 2022-08-12 | End: 2022-08-12

## 2022-08-12 RX ADMIN — BENZOCAINE AND LEVOMENTHOL: 200; 5 SPRAY TOPICAL at 13:17

## 2022-08-12 RX ADMIN — DOCUSATE SODIUM 100 MG: 100 CAPSULE, LIQUID FILLED ORAL at 21:07

## 2022-08-12 RX ADMIN — Medication 87.3 MILLI-UNITS/MIN: at 04:09

## 2022-08-12 RX ADMIN — MORPHINE SULFATE 4 MG: 4 INJECTION, SOLUTION INTRAMUSCULAR; INTRAVENOUS at 04:04

## 2022-08-12 RX ADMIN — DOCUSATE SODIUM 100 MG: 100 CAPSULE, LIQUID FILLED ORAL at 13:17

## 2022-08-12 RX ADMIN — Medication 166.7 ML: at 03:58

## 2022-08-12 RX ADMIN — LIDOCAINE HYDROCHLORIDE: 10 INJECTION, SOLUTION EPIDURAL; INFILTRATION; INTRACAUDAL; PERINEURAL at 03:56

## 2022-08-12 RX ADMIN — IBUPROFEN 800 MG: 800 TABLET, FILM COATED ORAL at 21:08

## 2022-08-12 RX ADMIN — IBUPROFEN 800 MG: 800 TABLET, FILM COATED ORAL at 13:17

## 2022-08-12 ASSESSMENT — PAIN DESCRIPTION - LOCATION
LOCATION: ABDOMEN
LOCATION: PERINEUM

## 2022-08-12 ASSESSMENT — PAIN DESCRIPTION - DESCRIPTORS
DESCRIPTORS: BURNING;SHARP
DESCRIPTORS: CRAMPING

## 2022-08-12 ASSESSMENT — PAIN SCALES - GENERAL
PAINLEVEL_OUTOF10: 7
PAINLEVEL_OUTOF10: 2
PAINLEVEL_OUTOF10: 5

## 2022-08-12 ASSESSMENT — PAIN DESCRIPTION - ORIENTATION
ORIENTATION: LOWER
ORIENTATION: LOWER

## 2022-08-12 NOTE — FLOWSHEET NOTE
Pt assisted to sitting on side of bed to dangle, denies dizziness/lightheadedness. Pt up w/o difficulty to bathroom x1 assist. Pt able to void. Pt taught postpartum pericare on use of tucks pads and dermaplast spray, demonstration returned. Gown changed. Pt tolerated well.  Assisted to wheelchair, transferred to postpartum room 2258 with infant in arms

## 2022-08-12 NOTE — LACTATION NOTE
This note was copied from a baby's chart. LC called to room to observe feeding. Mother nursing infant in cradle hold at left breast. Infant appears to have a deep latch and while mother states the beginning of the feeding was painful, the pain diminished as the feeding progressed. Mother currently states pulling and tugging and denies pain with the latch. Encouraged mother to continue with wound care using EBM and lanolin, along with continuing to work on obtaining a deep latch using optimal positioning and latch on techniques. Encouraged mother to continue with skin to skin, hand expression, and frequent attempts at the breast.    Mother states understanding of all information and denies further needs at this time.

## 2022-08-12 NOTE — FLOWSHEET NOTE
SVE 10/100/+2 per SARAH Montelongo RN. Salinas Campoverde notified of imminent delivery. MD to bedside.

## 2022-08-12 NOTE — H&P
Department of Obstetrics and Gynecology   Obstetrics History and Physical        CHIEF COMPLAINT:  contractions    HISTORY OF PRESENT ILLNESS:    Ashok Marie  is a 40 y.o.  female at 38w1d presents with a chief complaint as above and is being admitted for active labor    Estimated Due Date: Estimated Date of Delivery: 22    PRENATAL CARE: Complicated by: anxiety; arnold chiari malformation; gastric ulcer    PAST OB HISTORY:  OB History          2    Para   2    Term   2            AB        Living   2         SAB        IAB        Ectopic        Molar        Multiple   0    Live Births   2              Past Medical History:        Diagnosis Date    Chiari I malformation ([address]     repaired    Gastric ulcer     ? from advil    Vocal cord nodules     chronic hoarseness/ voice training no help     Past Surgical History:        Procedure Laterality Date    BRAIN SURGERY      decomp surg/Shabbabian     WISDOM TOOTH EXTRACTION  age 17     Allergies:  Imitrex [sumatriptan]  Social History:    Social History     Socioeconomic History    Marital status:      Spouse name: Not on file    Number of children: Not on file    Years of education: Not on file    Highest education level: Not on file   Occupational History    Occupation: Sr  Marketing   Tobacco Use    Smoking status: Never    Smokeless tobacco: Never   Vaping Use    Vaping Use: Never used   Substance and Sexual Activity    Alcohol use: Not Currently     Alcohol/week: 0.0 - 12.0 standard drinks    Drug use: No    Sexual activity: Yes     Partners: Male   Other Topics Concern    Not on file   Social History Narrative    Not on file     Social Determinants of Health     Financial Resource Strain: Low Risk     Difficulty of Paying Living Expenses: Not hard at all   Food Insecurity: No Food Insecurity    Worried About [address] in the Last Year: Never true    [facility] in the Last Year: Never true Transportation Needs: Not on file   Physical Activity: Not on file   Stress: Not on file   Social Connections: Not on file   Intimate Partner Violence: Not on file   Housing Stability: Not on file     Family History:       Problem Relation Age of Onset    Diabetes Father     Other Brother         Chondrosarcoma     Medications Prior to Admission:  Medications Prior to Admission: sertraline (ZOLOFT) 25 MG tablet, Take 1 tablet by mouth daily  triamcinolone (KENALOG) 0.1 % cream, Apply to affected areas twice daily for up to 2 weeks or until improved. Prenatal Vit-DSS-Fe Cbn-FA (PRENATAL ADVANTAGE PO), Take by mouth    REVIEW OF SYSTEMS:  negative     PHYSICAL EXAM:    Vitals:    08/12/22 0416 08/12/22 0453   BP: (!) 109/56 (!) 109/56   Pulse: 83 83   Resp: 16 16   Temp: 97.9 °F (36.6 °C) 97.9 °F (36.6 °C)   TempSrc: Oral    SpO2:  100%   Weight: 131 lb (59.4 kg)    Height: 5' 2\" (1.575 m)      General appearance:  awake, alert, cooperative, no apparent distress, and appears stated age  Neurologic:  Awake, alert, oriented to name, place and time.     Lungs:  No increased work of breathing, good air exchange  Abdomen:  Soft, non tender, gravid, fundal height consistent with the gestational age, EFW by Leopold's maneuvers is AGA  Pelvis:  Adequate pelvis  Cervix: 10/100/+2 per RN  Contraction frequency: every 2 minutes  Membranes:  Ruptured clear fluid  Labs: CBC:   Lab Results   Component Value Date/Time    WBC 11.5 03/26/2021 12:30 PM    RBC 3.97 03/26/2021 12:30 PM    HGB 12.7 03/26/2021 12:30 PM    HCT 36.5 03/26/2021 12:30 PM    MCV 91.9 03/26/2021 12:30 PM    MCH 31.9 03/26/2021 12:30 PM    MCHC 34.7 03/26/2021 12:30 PM    RDW 12.8 03/26/2021 12:30 PM     03/26/2021 12:30 PM    MPV 8.9 03/26/2021 12:30 PM       ASSESSMENT:  <principal problem not specified>    PLAN: Admit  Labor: Routine labor orders  Fetus: Reassuring  GBS: Positive (delivered within 15 minutes of arrival, did not receive PCN)    I have

## 2022-08-12 NOTE — LACTATION NOTE
This note was copied from a baby's chart. Lactation Progress Note  Initial Consult    Data: Referral received per RN. Action: LC to room. Mother states agreeable to consult from [facility] at this time. I reviewed Care Plan for First 24 Hours of Life already in patient binder. Discussed recognizing hunger cues and offering the breast when cues are shown. Encouraged breastfeeding on demand and attempting/offering at least every 3 hours. Informed infant may have one 5 hour stretch of sleep in a 24 hour period. Encouraged unlimited skin to skin contact with infant and reviewed benefits including better temperature, heart rate, respiration, blood pressure, and blood sugar regulation. Also increased bonding and milk supply associated with skin to skin contact. Discussed feeding positions, latch on techniques, signs of milk transfer, output goals and normal feeding/sleeping behaviors. I referred mother to binder for additional information about breastfeeding and skin to skin contact. Discussed hand expression and encouraged mother to practice getting drops to infant today. Provided colostrum cups, syringes, and spoons for mother to express into and ways to get that milk to infant. Reinforced importance of positioning infant nose to nipple, belly to belly, waiting for wide open mouth, and bringing baby onto breast to ensure a deep latch. Discussed importance of obtaining deep latch to ensure proper milk transfer, milk production and supply and maternal comfort. Mother has breastfeeding hx of 5 months with previous child (now 14 months). Mother states milk was slow to come in and she lost -13%. Will try hand expressing \"extra\" milk to this infant in first few days to try to avoid those issues. Mother already has a new breast pump for home use. Gave breastfeeding booklet along with additional resources for after discharge.     I wrote my name and circled the phone number on patient's whiteboard, provided a lactation consultant business card, directed mother to kajeet for evidence based information, and encouraged mother to call for a feeding. Response: Mother verbalizes understanding of information given and denies further needs at this time. Mother states will call for next feeding.

## 2022-08-12 NOTE — FLOWSHEET NOTE
Pt to room 2288 via wheelchair. C/O ctx pain 10/10 requesting epidural. Pt assisted to bed, attempted to place on EFM, pt thrashing in bed due to discomfort.

## 2022-08-12 NOTE — FLOWSHEET NOTE
MD at bedside, pt requesting epidural.CRNA notified. IV placed and blood work obtained. Pt feeling pressure and urge to \"push\".

## 2022-08-12 NOTE — L&D DELIVERY NOTE
Department of Obstetrics and Gynecology  Spontaneous Vaginal Delivery Note      Yudi Stewart [facility],[id number]    PRENATAL CARE: Complicated by: anxiety    Pre-operative Diagnosis:  IUP @ 38 1/7 wks; active labor      Post-operative Diagnosis: The same    Additional Procedures: 2nd degree perineal laceration repair     Information for the patient's : Jese Yates [[id number]]                  Infant Wt:   Information for the patient's : Jese Oquendozoey [[id number]]          APGARS:   9/9  Information for the patient's : Jese Yates [[id number]]         Anesthesia:  none    Specimen:  Placenta sent to pathology No    Estimated blood loss: 200 cc     Condition: stable    Delivery Summary: Patient is Rupali Stover is a 40 y.o. Jasmyne Primus female at 38w1d admitted to Labor and Delivery room and placed on external monitors. Contractions were regular, FHT was reactive with moderate variability without decels. Patient did not received epidural anesthesia. Labor progressed as anticipated to fully dilated cervix. With subsequent contractions she started pushing and delivered vaginally spontaneously with no complications. A loose nuchal cord x 1 was noted. 10 Units of Pitocin in 500 ml of LR was started IV. Placenta, cord and membranes were delivered spontaneously within less than 15 minutes. Uterus was not explored. Vaginal walls, cervix, perineum, rectum were intact on inspection. The cervix, vagina and perineum were examined and a 2nd degree perineal laceration was repaired in the regular fashion with Vicryl. Uterus was well contracted. Vaginal sweep was done, laps count was correct. Mother and  left stable to recovery.      Electronically signed by Bo William MD on 2022 at 5:26 AM

## 2022-08-12 NOTE — FLOWSHEET NOTE
Awake and alert, Reviewed plan of care with patient and spouse for patient and infant.  Verbalized understanding

## 2022-08-12 NOTE — FLOWSHEET NOTE
of viable male infant placed on abdomen, dry stimulation and bulb syringe used. Delayed cord clamping. Cord clamped infant placed skin to skin with mother. See delivery summary and  delivery details.

## 2022-08-12 NOTE — FLOWSHEET NOTE
delivery of viable male infant. Cord clamped and cut per MD. Infant placed on mother's abdomen, dried and stimulated with spontaneous cry. Infant placed skin to skin. Warm blanket, hat, and diaper applied. Apgars 8/9 . Laceration 2 degree. .

## 2022-08-13 PROCEDURE — 6370000000 HC RX 637 (ALT 250 FOR IP): Performed by: OBSTETRICS & GYNECOLOGY

## 2022-08-13 PROCEDURE — 1220000000 HC SEMI PRIVATE OB R&B

## 2022-08-13 RX ORDER — SIMETHICONE 80 MG
80 TABLET,CHEWABLE ORAL EVERY 6 HOURS PRN
Status: DISCONTINUED | OUTPATIENT
Start: 2022-08-13 | End: 2022-08-14 | Stop reason: HOSPADM

## 2022-08-13 RX ADMIN — IBUPROFEN 800 MG: 800 TABLET, FILM COATED ORAL at 14:54

## 2022-08-13 RX ADMIN — DOCUSATE SODIUM 100 MG: 100 CAPSULE, LIQUID FILLED ORAL at 20:55

## 2022-08-13 RX ADMIN — SIMETHICONE 80 MG: 80 TABLET, CHEWABLE ORAL at 16:25

## 2022-08-13 RX ADMIN — IBUPROFEN 800 MG: 800 TABLET, FILM COATED ORAL at 23:37

## 2022-08-13 RX ADMIN — DOCUSATE SODIUM 100 MG: 100 CAPSULE, LIQUID FILLED ORAL at 08:50

## 2022-08-13 RX ADMIN — IBUPROFEN 800 MG: 800 TABLET, FILM COATED ORAL at 06:02

## 2022-08-13 ASSESSMENT — PAIN SCALES - GENERAL
PAINLEVEL_OUTOF10: 3
PAINLEVEL_OUTOF10: 4
PAINLEVEL_OUTOF10: 3
PAINLEVEL_OUTOF10: 4

## 2022-08-13 NOTE — PROGRESS NOTES
Department of Obstetrics and Gynecology  Vaginal Delivery Postpartum Rounds    SUBJECTIVE:  Pain is controlled with non-steroidal anti-inflammatory drugs. Her lochia is normal.    OBJECTIVE:  Vital Signs: /70   Pulse 78   Temp 98.3 °F (36.8 °C) (Oral)   Resp 18   Ht 5' 2\" (1.575 m)   Wt 131 lb (59.4 kg)   SpO2 100%   Breastfeeding Unknown   BMI 23.96 kg/m²   Appearance/Psychiatric: awake, alert, cooperative, no apparent distress, appears stated age  Constitutional: The patient is well nourished. Cardiovascular: She does not have edema. Respiratory: Respiratory effort is normal.  Gastrointestinal: Soft, non tender, uterine fundus is firm below umbilicus  Extremities: nontender to palpation  Perineum: intact     ASSESSMENT:    Postpartum Day 1 s/p     PLAN:   1. Continue routine postpartum care   2. Discharge home on Postpartum Day 1  3. Return to office in 6 weeks   4.  Postpartum instructions reviewed and all patient's Questions answered    Electronically signed by Suyapa Briggs MD on 2022 at 7:06 AM

## 2022-08-13 NOTE — DISCHARGE INSTRUCTIONS
Department of Obstetrics and Gynecology  Vaginal Delivery Postpartum Discharge instructions    You will need a postpartum visit in the clinic 6 weeks after your delivery. Please call office [phone] to schedule an appointment:      Please call the office or the OB/GYN on-call if after-hours for any of the followin) Fever - a temperature greater than 100.4  2) Uncontrolled pain  3) Uncontrolled bleeding (soaking more than 1 pad in an hour)  4) Foul-smelling discharge from the vagina    Do not place anything in the vagina - this includes tampons, douches or having sex - until after your 6 week postpartum visit to prevent infection. Thank you for the opportunity to care for you and your family. We hope we always exceeded your expectations and provided very good care during your stay in the [facility]. We want to ensure that you have the help you need when you leave the hospital. If there is anything we can assist you with please let us know. Please call and schedule an appointment to be seen by your obstetrician as scheduled. You will need to call and make your own appointment. For breastfeeding moms, you can contact our lactation consultants with any problems or questions. Please call [phone] for questions. Diet  Eat a well balanced diet focusing on foods high in fiber and protein. Drink plenty of fluids, especially water. To avoid constipation you may take a mild stool softener as recommended by your doctor or midwife. Activity  Gradually increase your activity. Resume exercise only after advise by your doctor or midwife. Avoid lifting anything heavier than your baby for 2 weeks. Avoid driving for 5-7 days or when not taking narcotics. Rise slowly from a lying to sitting and then a standing position. Climb stairs one at a time. Use caution when carrying your baby up and down the stairs. NO SEXUAL activity for 6 weeks or until advised by your doctor.   Nothing in the vagina. No tampons, no douches and no intercourse. Be prepared to discuss family planning at your follow-up OB visit. You may feel tired or have a lack of energy. You may continue your prenatal vitamin to replenish nutrients post delivery. Nap when your baby naps to catch up on sleep. EMOTIONS  You may feel vera, sad, teary and/or overwhelmed. Contact your OB provider if you feel you may be showing signs of postpartum depression or have thoughts of harming yourself or your baby. If infant will not stop crying, contact another adult for help. Place the infant in his/her crib and step away for a break. NEVER shake your baby. BLEEDING  Vaginal bleeding will decrease in amount over the next few weeks. You will notice that as your activity increases, you flow may increase. This is your body's way of telling you to \"take it easy\" and rest.  Call your OB if you are saturating more than one maxi pad in an hour for 2 hours and resting does not help. Also call if your bleeding has a foul odor or you are passing clots larger than a lemon on several occasions. BREAST CARE  Take pain medications as needed and as prescribed by your [facility] provider for pain. If you develop a warm, red, tender area on your breast or develop a fever contact your OB provider. For breastfeeding mothers: If you become engorged, feeding may be more difficult or painful for 1-2 days. You may find it helpful to hand express some milk. Hand expressing may make it easier for your baby to latch. While breastfeeding, continue to take your prenatal vitamins as directed by your doctor or midwife. Refer to the breastfeeding booklet in the  folder/binder for more information. For any questions or concerns please contact a Lactation Consultant. Leave a message and your call will be returned. INCISIONAL CARE/ EPISIOTOMY CARE    Episiotomy (bib-care):   Use the bib-bottle after toileting, until your bleeding stops. Cleanse your perineum from front to back. If used, stitches will dissolve in 4-6 weeks. You may use a sitz bath or soak in a clean tub with antibacterial soap as needed for comfort. Kegel exercises will help restore bladder control. SWELLING  Try to keep your legs elevated when you are sitting. When lying down, keep your legs elevated. When wearing stockings or socks, make sure they are not too tight. WHEN TO CALL THE DOCTOR  If you have a temperature of 100.6 or more. If your bleeding has increased and you are saturating a pad or more in 1 hour for 2 hours. Your abdomen is tender to the touch. You are passing blood clots bigger than the size of a lemon for several occasions. If you are experiencing extreme weakness or dizziness. If you are having flu-like symptoms: achy muscles or joints. If there is a foul-smell or green color to your vaginal bleeding. If you have pain that cannot be relieved. You have persistent burning with urination or frequency. Call if you have concerns about your well-being. You are unable to sleep, eat or are having thoughts of harming yourself or your baby. You have swelling, bleeding, drainage, foul odor, redness or warmth in/around your incision or stitches. You have a red, warm or tender area in your calf. Headache that rest and Tylenol does not relieve.

## 2022-08-13 NOTE — FLOWSHEET NOTE
Patient with complaints of sore nipples, patient asked if there is anything other than lanolin to use, this RN brought hydrogel pads to bedside and instructed patient on use and need to wipe off nipple before latching infant, patient verbalizes understanding, pads placed on patient and patient reports relief. Will continue to monitor.

## 2022-08-13 NOTE — FLOWSHEET NOTE
RN to room to introduce self to patient and update board. Plan of care for tonight discussed. Pt denies any needs at this time. Will continue to monitor.

## 2022-08-14 VITALS
BODY MASS INDEX: 24.11 KG/M2 | TEMPERATURE: 97.6 F | SYSTOLIC BLOOD PRESSURE: 131 MMHG | WEIGHT: 131 LBS | HEIGHT: 62 IN | DIASTOLIC BLOOD PRESSURE: 85 MMHG | HEART RATE: 73 BPM | OXYGEN SATURATION: 98 % | RESPIRATION RATE: 16 BRPM

## 2022-08-14 PROCEDURE — 6370000000 HC RX 637 (ALT 250 FOR IP): Performed by: OBSTETRICS & GYNECOLOGY

## 2022-08-14 RX ADMIN — IBUPROFEN 800 MG: 800 TABLET, FILM COATED ORAL at 08:02

## 2022-08-14 RX ADMIN — SIMETHICONE 80 MG: 80 TABLET, CHEWABLE ORAL at 10:11

## 2022-08-14 RX ADMIN — BENZOCAINE AND MENTHOL 1 LOZENGE: 15; 3.6 LOZENGE ORAL at 06:31

## 2022-08-14 RX ADMIN — DOCUSATE SODIUM 100 MG: 100 CAPSULE, LIQUID FILLED ORAL at 08:02

## 2022-08-14 ASSESSMENT — PAIN DESCRIPTION - FREQUENCY: FREQUENCY: INTERMITTENT

## 2022-08-14 ASSESSMENT — PAIN DESCRIPTION - DESCRIPTORS
DESCRIPTORS: CRAMPING
DESCRIPTORS: CRAMPING

## 2022-08-14 ASSESSMENT — PAIN DESCRIPTION - ONSET: ONSET: ON-GOING

## 2022-08-14 ASSESSMENT — PAIN - FUNCTIONAL ASSESSMENT
PAIN_FUNCTIONAL_ASSESSMENT: ACTIVITIES ARE NOT PREVENTED

## 2022-08-14 ASSESSMENT — PAIN DESCRIPTION - PAIN TYPE: TYPE: ACUTE PAIN

## 2022-08-14 ASSESSMENT — PAIN DESCRIPTION - ORIENTATION
ORIENTATION: LOWER
ORIENTATION: LOWER

## 2022-08-14 ASSESSMENT — PAIN SCALES - GENERAL
PAINLEVEL_OUTOF10: 1

## 2022-08-14 ASSESSMENT — PAIN DESCRIPTION - LOCATION
LOCATION: ABDOMEN
LOCATION: ABDOMEN

## 2022-08-14 NOTE — FLOWSHEET NOTE
Postpartum and infant care teaching completed and forms signed by patient. Copy witnessed by RN and given to patient. Patient verbalized understanding of all teaching points. Patient plans to follow-up with [facility] Provider as instructed. Patient verbalizes understanding of discharge instructions and denies further questions. ID bands checked. Mother's ID band and one of baby's ID bands removed and taped to footprint sheet, signed by patient and witnessed by RN. Patient discharged in stable condition accompanied by spouse. Discharged in wheelchair, holding baby in car seat.

## 2022-08-14 NOTE — DISCHARGE SUMMARY
Department of Obstetrics and Gynecology  Postpartum Discharge Summary      Admit Date: 2022    Admit Diagnosis: Term pregnancy [Z34.90]    Discharge Date:   Any delay in discharge from ordered D/C date due to  factors. Discharge Diagnoses: spontaneous vaginal delivery       Medication List        CONTINUE taking these medications      PRENATAL ADVANTAGE PO     sertraline 25 MG tablet  Commonly known as: ZOLOFT  Take 1 tablet by mouth daily     triamcinolone 0.1 % cream  Commonly known as: KENALOG  Apply to affected areas twice daily for up to 2 weeks or until improved. Service: Obstetrics    Consults: none and None    Significant Diagnostic Studies: none    Postpartum complications: none     Condition [place] [facility]: uncomplicated    Discharge Instructions: Activity: as tolerated    Diet: regular diet    Instructions: No intercourse and nothing in the vagina for 6 weeks. Do not drive while using pain medications. Keep any wounds clean and dry    Discharge to: Home    Disposition / Follow up: Return to office in 6 weeks    Home Health Nurse visit within 24-48 h if qualifies     Data:  Apgars:  Information for the patient's : Shavonne Cloud [[id number]]   APGAR One: 8   Information for the patient's : Shavonne Antony [[id number]]   APGAR Five: 9   Birth Weight:  Information for the patient's :   Shavonne Cloud [[id number]]   Birth Weight: 6 lb 11.9 oz (3.06 kg)   Home with mother    Electronically signed by Darrell Sargent MD on 2022 at 11:16 AM

## 2022-08-14 NOTE — FLOWSHEET NOTE
Report received from CHAPARRITA Agosto Deputy RN. Patient resting in bed. Whiteboard updated. No complaints at this time. Plan of care for the day discussed. Call light within reach. No questions at this time.

## 2022-08-14 NOTE — PLAN OF CARE
Problem: Pain  Goal: Verbalizes/displays adequate comfort level or baseline comfort level  2022 0833 by Kody Martin RN  Outcome: Progressing  Flowsheets (Taken 2022 0802)  Verbalizes/displays adequate comfort level or baseline comfort level:   Assess pain using appropriate pain scale   Encourage patient to monitor pain and request assistance  2022 0451 by Ivon Caldera RN  Outcome: Progressing  Flowsheets  Taken 2022 2337 by Ivon Caldera RN  Verbalizes/displays adequate comfort level or baseline comfort level:   Encourage patient to monitor pain and request assistance   Assess pain using appropriate pain scale   Administer analgesics based on type and severity of pain and evaluate response   Implement non-pharmacological measures as appropriate and evaluate response   Consider cultural and social influences on pain and pain management   Notify Licensed Independent Practitioner if interventions unsuccessful or patient reports new pain  Taken 2022 1625 by Wilton Maldonado RN  Verbalizes/displays adequate comfort level or baseline comfort level:   Assess pain using appropriate pain scale   Administer analgesics based on type and severity of pain and evaluate response     Problem: Postpartum  Goal: Experiences normal postpartum course  Description:  Postpartum OB-Pregnancy care plan goal which identifies if the mother is experiencing a normal postpartum course  2022 0833 by Kody Martin RN  Outcome: Progressing  2022 045 by Ivon Caldera RN  Outcome: Progressing  Goal: Appropriate maternal -  bonding  Description:  Postpartum OB-Pregnancy care plan goal which identifies if the mother and  are bonding appropriately  2022 08 by Kody Martin RN  Outcome: Progressing  2022 0451 by Ivon Caldera RN  Outcome: Progressing  Goal: Establishment of infant feeding pattern  Description:  Postpartum OB-Pregnancy care plan goal which identifies if the mother is establishing a feeding pattern with their   2022 0959 by Margaret Pearson RN  Outcome: Progressing  2022 045 by Yael Lange RN  Outcome: Progressing  Goal: Incisions, wounds, or drain sites healing without S/S of infection  2022 08 by Margaret Pearson RN  Outcome: Progressing  Flowsheets (Taken 2022 0802)  Incisions, Wounds, or Drain Sites Healing Without Sign and Symptoms of Infection: ADMISSION and DAILY: Assess and document risk factors for pressure ulcer development  2022 045 by Yael Lange RN  Outcome: Progressing     Problem: Infection - Adult  Goal: Absence of infection at discharge  2022 08 by Margaret Pearson RN  Outcome: Progressing  2022 045 by Yael Lange RN  Outcome: Progressing  Goal: Absence of fever/infection during anticipated neutropenic period  2022 08 by Margaret Pearson RN  Outcome: Progressing  2022 by Yael Lange RN  Outcome: Progressing     Problem: Safety - Adult  Goal: Free from fall injury  2022 08 by Margaret Pearson RN  Outcome: Progressing  2022 045 by Yael Lange RN  Outcome: Progressing     Problem: Discharge Planning  Goal: Discharge to home or other facility with appropriate resources  2022 by Margaret Pearson RN  Outcome: Progressing  2022 by Yael Lange RN  Outcome: Progressing

## 2022-08-14 NOTE — PLAN OF CARE
Progressing  Goal: Appropriate maternal -  bonding  Description:  Postpartum OB-Pregnancy care plan goal which identifies if the mother and  are bonding appropriately  2022 1203 by Krunal Garcia RN  Outcome: Completed  2022 08 by Krunal Garcia RN  Outcome: Progressing  2022 045 by Abbey Oneill RN  Outcome: Progressing  Goal: Establishment of infant feeding pattern  Description:  Postpartum OB-Pregnancy care plan goal which identifies if the mother is establishing a feeding pattern with their   2022 1203 by Krunal Garcia RN  Outcome: Completed  2022 08 by Krunal Garcia RN  Outcome: Progressing  2022 045 by Abbey Oneill RN  Outcome: Progressing  Goal: Incisions, wounds, or drain sites healing without S/S of infection  2022 1203 by Krunal Garcia RN  Outcome: Completed  2022 by Krunal Garcia RN  Outcome: Progressing  Flowsheets (Taken 2022 0802)  Incisions, Wounds, or Drain Sites Healing Without Sign and Symptoms of Infection: ADMISSION and DAILY: Assess and document risk factors for pressure ulcer development  2022 045 by Abbey Oneill RN  Outcome: Progressing     Problem: Infection - Adult  Goal: Absence of infection at discharge  2022 by Krunal Garcia RN  Outcome: Completed  2022 08 by Krunal Garcia RN  Outcome: Progressing  2022 045 by Abbey Oneill RN  Outcome: Progressing  Goal: Absence of fever/infection during anticipated neutropenic period  2022 1203 by Krunal Garcia RN  Outcome: Completed  2022 08 by Krunal Garcia RN  Outcome: Progressing  2022 045 by Abbey Oneill RN  Outcome: Progressing     Problem: Safety - Adult  Goal: Free from fall injury  2022 1203 by Krunal Garcia, HEIKE  Outcome: Completed  2022 08 by Krunal Garcia RN  Outcome: Progressing  2022 045 by Abbey Oneill RN  Outcome: Progressing     Problem: Discharge Planning  Goal: Discharge to home or other facility with appropriate resources  8/14/2022 1203 by Doron Evans RN  Outcome: Completed  8/14/2022 0833 by Doron Evans RN  Outcome: Progressing  8/14/2022 0451 by Afia Garcia RN  Outcome: Progressing     Problem: Chronic Conditions and Co-morbidities  Goal: Patient's chronic conditions and co-morbidity symptoms are monitored and maintained or improved  8/14/2022 0451 by Afia Garcia RN  Outcome: Completed

## 2022-08-18 NOTE — PROGRESS NOTES
[address] [facility]  Phone: [phone]   Fax: [phone]      Physical Therapy Discharge Summary  Pelvic Floor Physical Therapy     Dear Dr Todd Witt ,     We had the pleasure of treating the following patient for physical therapy services at [address] [place] [address]. A summary of our findings can be found in the discharge summary below. If you have any questions or concerns regarding these findings, please do not hesitate to contact me at the office phone number above. Thank you for the referral.         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  Prior to setting up her next follow up appointment, patient delivered her baby. She will discontinue pelvic floor physical therapy at this time due to allowing ample time to heal after vaginal delivery and will need to obtain a new prescription if she wishes to continue in the future.                               Overall Response to Treatment:              [x]Patient is responding well to treatment and improvement is noted with regards  to goals              [x]Patient should continue to improve in reasonable time if they continue HEP              []Patient has plateaued and is no longer responding to skilled PT intervention                    []Patient is getting worse and would benefit from return to referring MD              []Patient unable to adhere to initial POC [x]Other: apparently patient had a successful vaginal delivery of her second child, did tear and required some sutures, but overall tolerated delivery fairly well according to phone discussion on  and felt that PF PT for birth preparation was helpful. Date range of Visits: 22-22  Total Visits: 4     Recommendation:               [x] Discharge to [facility]. Follow up with PT or MD PRN. Please see last progress note below for discharge status when last seen on 22. 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 PPO - 40 visits per calendar year     Physician Information:  Yang Arellano MD     Plan of care signed (Y/N): N  Will resend eval to Yang Arellano MD     Visit# / total visits: 4/10+  Time in: 1000  Time Out: 1100  Total Treatment Time: 60  minutes     Charges: Therapeutic Activity (84808) x 2                        Therapeutic Exercise (84004) - NA                        Manual (35267) x2                        Neuromuscular Re-ed (93468) - NA                             ________________________________________________________________________________________     Pain Level: no specific complaints     SUBJECTIVE:  Patient reports that she has 5 weeks until expected delivered - due  - was 2 weeks early with first pregnancy.   SI pain continues to be better overall - spouse is putting her child to bed or she is using rocking chair rather than standing to rock her child to breaths     Happy baby - hold for 1 minute = 10 slow, deep breaths s 2-3 reps OR 2-3 minutes total - patient reports performing at least once a day     Positioning with legs at 90/90 position for elevation with isometric contractions of gluts/PF to improve pelvic vascular congestion - suggested ice and compression as well. - patient reports consistently elevating hips/pelvis with pillow at night when sleeping, feels that swelling is less prominent  SI reset -  R hip flex isometric into hand with slight L bridge x 5 reps - reports improvement in symptoms at lower back/L SI joint  Calf stretches - standing - gastroc and soleus - hold  hold 8-10 breaths/~1 minute x 2 reps each LE     Manual Interventions -   Hip adductor massage and myofascial release - noted mild to moderate tightness and tenderness. Patient verbally consented to transvaginal PF muscle assessment and intervention. Provided firm support/intermittent compression for varicosities in vaginal region with edema/engorgement of tissues making perineal area feel \"heavy\" and uncomfortable. Transvaginal myofascial release and stretching of superficial, middle, and deep layers of PF muscles with focus on areas of increased tension and tissue resistance. As pregnancy is progressing, focus on superficial muscle layer to prepare tissues for stretch required for vaginal birth. Internal clock             Superficial - mild to moderate tenderness and tightness - pill rolling and ironing throughout tissue layer 1-11 o'clock with increased tissue tightness and hypersensitivity at 3 and 9 o'clock - improved with gentle massage and simultaneous myofascial release in lower abdomen and upper thigh  Instructed patient and how she can have spouse provide superficially myofascial release and/or support with upward and outward stretching of tissue of lower abdomen.        Sustained downward pressure with two fingers at posterior introitus to simulate tissue stretching during birthing. Patient tolerated with mild to moderate discomfort and occasional reflexive contraction of PF muscles. Patient was aware of and was able to use breathing to help with relaxation of muscles. Noted moderate relief and decreased hypersensitivity of superficial layers of PF muscles and tissues with manual interventions. Patient Education -   Extensive education on anatomy of pelvis including PF muscles and pelvic organs. Emphasized need for stretching and down training of tight muscles and strengthening of weak muscles to promote improve muscle balance in pelvis/low back and legs. Used PF model to demonstrate transvaginal PF muscle assessment to which patient verbally consented. Patient appeared to have better understanding of current bladder/bowel issues and improved outlook on situation by end of PF PT therapy session. Familiar with PF PT from previous bout which ended in 10/2021. Instructed on firm support for varicosities in vaginal region with edema/engorgement of tissues making perineal area feel \"heavy\" and uncomfortable. Reviewed use of ice pack and elevation of feet/legs and even posterior pelvic tilt to decrease swelling of labial tissues. Reviewed need for elevation, compression, ice and gentle muscle pump to improve vascular congestion in pelvic area, noted and reportedly continuing in labia. Issued information on compression garment that may be helpful. Discussed blood pressure which tends to be low - cues to perform ankle pumps to encourage blood flow and muscle pump action, decrease pooling of blood. Educated in mechanics of core stabilization with proper diaphragmatic breathing for improved intraabdominal pressures and therefore more balance control of pressure on PF muscles especially during functional activities. Emphasized need for rest/sleeping well to improve tissue healing.     Reviewed PROPER stretching techniques to avoid pain and allow tension to release prior to continuing/progressing with a stretch. Emphasized need to avoid pain and be less aggressive. Issued and reviewed handout on perineal massage in preparation for vaginal delivery. Patient demonstrates slightly less  anxiety centered about the birthing experience, continue to emphasized need to focus on relaxation techniques and manually preparing the tissues for vaginal delivery. Educated in need to explore and document different positions which she may try during the labor and birthing process. Reviewed need for visual imagery and breathing techniques which may be helpful during her labor. Addressed specific concerns of patient as they arose during session. Patient appeared to have a better understanding and appreciation of benefits of PF PT.         ASSESSMENT:  Patient reports that pain/discomfort in her L lower back has not been a problem since she has changed her body mechanics, is walking for exercise less, and is allowing her spouse to help more with childcare activities. Patient complains of less \"heaviness\" in her perineum/vulva related to vascular changes, since more regularly using elevation, ice, and isometrics for muscle pump action to decrease swelling and congestion. As patient is approaching anticipated delivery date, she wishes to focus PF PT treatment interventions on preparing the tissues that will need to stretch during vaginal delivery. Focus on manual interventions was on superficial muscle and tissue layer of PF in preparation for vaginal birth process. Patient with increased tightness and hypersensitivity bilaterally which did decrease with transvaginal manual interventions.   Patient would definitely benefit from continued pelvic floor physical therapy to advise in adjustment of behavioral modifications as well as advanced activities to prepare vaginal tissues for anticipated delivery and to decrease pelvic pain due to engorgement of labial tissues, tightness in all layers of PF muscles, and tightness in deep buttocks/hip rotator muscles causing irritation to sciatic nerve. Treatment/Activity Tolerance:     Patient tolerated treatment well  Reports improvement in L SI/lower back after performing muscle energy/setting exercises, but needs to be cautious with movements to decrease re-aggravation of symptoms. Has modified her activities to decrease risk of re-injury. Demonstrates continued but slightly less anxiety centered about the birthing experience, attempted to allay fears and instruct in coping mechanisms. Decrease tension and tenderness noted in PF muscles after manual interventions. GOALS:  Patient stated goal: figure out exercises that she can on her own to relieve the pain, to better prepare for upcoming birth process  [x] Progressing: [] Met: [] Not Met: [] Adjusted        Therapist goals for Patient:     Short Term Goals: To be achieved in: 4-5 sessions     1. Patient will have a decrease in pelvic heaviness and pain at site of vaginal scar and sciatic pain to indicate improvement in pelvic floor relaxation and improved tissue lengthening as well as stretches for surrounding hip/buttocks muscles. [] Progressing: [x] Met: [] Not Met: [] Adjusted  2. Perform HEP for pelvic floor and core muscle groups with minimal direction from therapist as she progresses to become more independent managing her pelvic pressure and PF and surrounding core muscle weakness and/or tightness. [] Progressing: [x] Met: [] Not Met: [] Adjusted     Long Term Goals: To be achieved in: 8-10 sessions     1. Improve score of quality of life index measure, FSFI, to 20/36 or more(initial eval - 17.9) disability index to assist with reaching prior level of function and demonstrating improved quality of life with less life interruptions due to pelvic pressure/pain with vaginal penetration. [] Progressing: [] Met: [] Not Met: [] Adjusted  2.  Patient will report ability to tolerate penetration without increase in pain to progress towards intercourse and medical examinations without pain or limitation. [] Progressing: [] Met: [] Not Met: [] Adjusted  3. Patient will demonstrate ability to improve flexibility and tissue stretch of vaginal tissue in preparation for upcoming vaginal delivery. [x] Progressing: [] Met: [] Not Met: [] Adjusted  4. Rate severity of the problem related to pelvic and sciatic pain to 4-5/10 or less on scale of 0-10 with 0 being no problem and 10 being the worst - (7/10 reported at intial eval). [x] Progressing: [] Met: [] Not Met: [] Adjusted            5. Perform HEP for pelvic floor and core muscle groups independently as she progresses to self-manage her pelvic pressure/pain and PF and surrounding core muscle weakness and/or tightness. [] Progressing: [] Met: [] Not Met: [] Adjusted                        Plan:  Continue per plan of care  Address ortho issues as they arise with changing body mass. Review with patient self stretching techniques  and techniques that her partner may be able to help her with to prepare birth canal for vaginal delivery. Frequency/Duration:     Patient is approaching delivery date of 8/25/22. Will continue to benefit from PF PT for manual interventions to continue to prepare tissues of PF for anticipated vaginal birth. Patient's next scheduled appointment is on 8/2 at [facility].  Electronically signed by Emily Juarez, PT #[id number]  on 7/19/2022 at 5:51 PM     Note: If patient does not return for scheduled/recommended follow up visits, this note will serve as a discharge from care along with the most recent update on progress.

## 2022-08-22 NOTE — PROGRESS NOTES
[facility]  Estefany Kwong MD  [phone]      Antonio Landa  1985    29 y.o. female     Date of Visit: 3/8/2022    Chief Complaint: dermatitis    History of Present Illness:    1. She presents today for few week history of a markedly pruritic eruption on the dorsal surfaces of the hands and also on the legs. She has been using Aveeno eczema cream in the last couple of days with improvement. 15 weeks pregnant. Review of Systems:  Gen: Feels well, good sense of health. Past Medical History, Family History, Surgical History, Medications and Allergies reviewed. Past Medical History:   Diagnosis Date    Chiari I malformation ([address]     repaired    Gastric ulcer     ? from advil    Vocal cord nodules     chronic hoarseness/ voice training no help     Past Surgical History:   Procedure Laterality Date    BRAIN SURGERY  2007    decomp surg/Shabbabian     WISDOM TOOTH EXTRACTION  age 17       Allergies   Allergen Reactions    Imitrex [Sumatriptan]      Morven bad     Outpatient Medications Marked as Taking for the 3/8/22 encounter (Office Visit) with Agustin Benton MD   Medication Sig Dispense Refill    triamcinolone (KENALOG) 0.1 % cream Apply to affected areas twice daily for up to 2 weeks or until improved. 45 g 1    Prenatal Vit-DSS-Fe Cbn-FA (PRENATAL ADVANTAGE PO) Take by mouth           Physical Examination     Well appearing. 1.  Dorsum of the right hand with an ill-defined fissured and scaly pink patch. Dorsal left hand with few scaly pink papules. Right popliteal fossa and left calf with few scaly pink papules. Assessment and Plan     1. Irritant dermatitis - mild, limited extent    Triamcinolone 0.1% cream twice daily for up to 2 weeks or until improved.       Encouraged use of a cream based moisturizer daily after bathing.       --Agustin Benton MD impaired balance/decreased flexibility/pain/decreased strength

## 2022-10-03 ENCOUNTER — PATIENT MESSAGE (OUTPATIENT)
Dept: DERMATOLOGY | Age: 37
End: 2022-10-03

## 2022-11-08 ENCOUNTER — OFFICE VISIT (OUTPATIENT)
Dept: DERMATOLOGY | Age: 37
End: 2022-11-08
Payer: COMMERCIAL

## 2022-11-08 DIAGNOSIS — D22.4 IRRITATED NEVUS OF NECK: Primary | ICD-10-CM

## 2022-11-08 PROCEDURE — 11305 SHAVE SKIN LESION 0.5 CM/<: CPT | Performed by: DERMATOLOGY

## 2022-11-08 NOTE — PROGRESS NOTES
[facility]  Brisa Callejas MD  [phone]      Renuka Medrano  1985    40 y.o. female     Date of Visit: 11/8/2022    Chief Complaint: mole    History of Present Illness:    Here today for removal a recurrently irritated and inflamed mole on the side of the neck. Review of Systems:  Gen: Feels well, good sense of health. Heme: No abnormal bruising or bleeding. Past Medical History, Family History, Surgical History, Medications and Allergies reviewed. Past Medical History:   Diagnosis Date    Chiari I malformation ([address]     repaired    Gastric ulcer     ? from advil    Vocal cord nodules     chronic hoarseness/ voice training no help     Past Surgical History:   Procedure Laterality Date    BRAIN SURGERY  2007    decomp surg/Shabbabian     WISDOM TOOTH EXTRACTION  age 17       Allergies   Allergen Reactions    Imitrex [Sumatriptan]      Trinchera bad     Outpatient Medications Marked as Taking for the 11/8/22 encounter (Office Visit) with Rudy Valle MD   Medication Sig Dispense Refill    Rimegepant Sulfate (NURTEC) 75 MG TBDP Take 75 mg by mouth daily as needed (headache) 4 tablet 0    sertraline (ZOLOFT) 100 MG tablet Take 1 tablet by mouth daily 90 tablet 3    triamcinolone (KENALOG) 0.1 % cream Apply to affected areas twice daily for up to 2 weeks or until improved. 45 g 1    Prenatal Vit-DSS-Fe Cbn-FA (PRENATAL ADVANTAGE PO) Take by mouth           Physical Examination       Well appearing. 1.  Left anterolateral neck below the jaw with a 4 mm round raised soft smooth pink papule. Assessment and Plan     1. Irritated nevus of neck - 4 mm    The patient requested that the lesion be removed. The procedure was discussed in detail. We also reviewed the risks of bleeding, scar, and infection. A consent form was signed by the patient. The lesion to be removed was marked with a surgical pen. Alcohol was used to cleanse the site.  Local anesthesia was acheived with a very small amount of 1% lidocaine with epinephrine. Shave removal of the lesion was performed down to mid dermis using a razor blade. Hemostasis was achieved with aluminum chloride. The wound was dressed with petrolatum and covered with a bandage. Wound care instructions were reviewed. 1 Specimen (s) sent to pathology. The specimen bottle(s) were appropriately labeled. The patient tolerated the procedure well and there were no immediate complications.           --Luci Price MD

## 2022-11-08 NOTE — PATIENT INSTRUCTIONS

## 2022-11-10 LAB — DERMATOLOGY PATHOLOGY REPORT: NORMAL

## 2023-01-30 ENCOUNTER — OFFICE VISIT (OUTPATIENT)
Dept: DERMATOLOGY | Age: 38
End: 2023-01-30
Payer: COMMERCIAL

## 2023-01-30 DIAGNOSIS — L74.519 PRIMARY FOCAL HYPERHIDROSIS: ICD-10-CM

## 2023-01-30 DIAGNOSIS — L24.9 IRRITANT DERMATITIS: ICD-10-CM

## 2023-01-30 DIAGNOSIS — L81.4 SOLAR LENTIGO: ICD-10-CM

## 2023-01-30 DIAGNOSIS — D22.9 MULTIPLE NEVI: Primary | ICD-10-CM

## 2023-01-30 PROCEDURE — 99213 OFFICE O/P EST LOW 20 MIN: CPT | Performed by: DERMATOLOGY

## 2023-01-30 NOTE — PROGRESS NOTES
[facility] Dermatology  Mercedez Baltazar MD  [address] [facility]  1985    40 y.o. female     Date of Visit: 1/30/2023    Chief Complaint: moles, sweating    History of Present Illness:    1. She presents today for evaluation of multiple moles on the trunk and extremities-not aware of any changes in size, color, or shape. 2.  She has stable freckling on the upper chest and shoulders. 3.  She has a history of irritant dermatitis of the hands. Has improved recently with application of Aquaphor. 4.  She also reports a many year history of excessive sweating on the hands and feet. She has a family history of melanoma-her sister and father both have a history of melanoma in situ. Review of Systems:  Gen: Feels well, good sense of health. Past Medical History, Family History, Surgical History, Medications and Allergies reviewed. Past Medical History:   Diagnosis Date    Chiari I malformation ([address]     repaired    Gastric ulcer     ? from advil    Vocal cord nodules     chronic hoarseness/ voice training no help     Past Surgical History:   Procedure Laterality Date    BRAIN SURGERY  2007    decomp surg/Shabbabian     WISDOM TOOTH EXTRACTION  age 17       Allergies   Allergen Reactions    Imitrex [Sumatriptan]      Howes bad     Outpatient Medications Marked as Taking for the 1/30/23 encounter (Office Visit) with Nelida Simmons MD   Medication Sig Dispense Refill    Rimegepant Sulfate (NURTEC) 75 MG TBDP Take 75 mg by mouth daily as needed (headache) 15 tablet 5    Rimegepant Sulfate (NURTEC) 75 MG TBDP Take 75 mg by mouth daily as needed (headache) 4 boxes 8 tablet 0    sertraline (ZOLOFT) 100 MG tablet Take 1 tablet by mouth daily 90 tablet 3    triamcinolone (KENALOG) 0.1 % cream Apply to affected areas twice daily for up to 2 weeks or until improved.  45 g 1    Prenatal Vit-DSS-Fe Cbn-FA (PRENATAL ADVANTAGE PO) Take by mouth         Social History:  Has 2 kids: youngest, 6 months. Physical Examination       The following were examined and determined to be normal: Psych/Neuro, Scalp/hair, Head/face, Conjunctivae/eyelids, Gums/teeth/lips, Neck, Breast/axilla/chest, Abdomen, Back, RLE, LLE, and Nails/digits. The following were examined and determined to be abnormal: RUE and LUE. Well appearing. 1.  Trunk and extremities with multiple well defined round to oval smooth brown macules and papules. Right medial forefoot with a small round smooth uniformly brown macule. 2.  Upper chest and shoulders with scattered round smooth light brown and tan macules. 3.  Dorsum of the hands overlying the metacarpophalangeal joints with ill-defined erythema and scaling. 4.  Hands wet. Assessment and Plan     1. Multiple nevi - benign appearing    Sun protective behaviors, including use of at least SPF 30 sunscreen, and self skin examinations were encouraged. Call for any new or concerning lesions. 2. Solar lentigines    Monitor for change. Sun protective behaviors encouraged including use of at least SPF 30 or more sunscreen. 3. Irritant dermatitis of the hands - mild    Triamcinolone 0.1% cream twice daily for up to 2 weeks or until improved. 4. 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 Consider Drysol solution in the family. Tretinoin cream nightly-prescribed from [facility] for antiaging purposes. Return in about 1 year (around 1/30/2024).     --Isabella Pack MD

## 2023-06-05 ENCOUNTER — ANESTHESIA EVENT (OUTPATIENT)
Dept: ENDOSCOPY | Age: 38
End: 2023-06-05
Payer: COMMERCIAL

## 2023-06-06 ENCOUNTER — HOSPITAL ENCOUNTER (OUTPATIENT)
Age: 38
Setting detail: OUTPATIENT SURGERY
Discharge: HOME | End: 2023-06-06
Attending: INTERNAL MEDICINE | Admitting: INTERNAL MEDICINE
Payer: COMMERCIAL

## 2023-06-06 ENCOUNTER — ANESTHESIA (OUTPATIENT)
Dept: ENDOSCOPY | Age: 38
End: 2023-06-06
Payer: COMMERCIAL

## 2023-06-06 VITALS
TEMPERATURE: 98.1 F | WEIGHT: 108.2 LBS | RESPIRATION RATE: 16 BRPM | HEIGHT: 62 IN | HEART RATE: 89 BPM | OXYGEN SATURATION: 100 % | SYSTOLIC BLOOD PRESSURE: 111 MMHG | DIASTOLIC BLOOD PRESSURE: 66 MMHG | BODY MASS INDEX: 19.91 KG/M2

## 2023-06-06 DIAGNOSIS — K62.5 RECTAL BLEEDING: ICD-10-CM

## 2023-06-06 PROCEDURE — 2709999900 HC NON-CHARGEABLE SUPPLY: Performed by: INTERNAL MEDICINE

## 2023-06-06 PROCEDURE — 3700000001 HC ADD 15 MINUTES (ANESTHESIA): Performed by: INTERNAL MEDICINE

## 2023-06-06 PROCEDURE — 2500000003 HC RX 250 WO HCPCS: Performed by: ANESTHESIOLOGY

## 2023-06-06 PROCEDURE — 7100000011 HC PHASE II RECOVERY - ADDTL 15 MIN: Performed by: INTERNAL MEDICINE

## 2023-06-06 PROCEDURE — 6360000002 HC RX W HCPCS: Performed by: ANESTHESIOLOGY

## 2023-06-06 PROCEDURE — 3700000000 HC ANESTHESIA ATTENDED CARE: Performed by: INTERNAL MEDICINE

## 2023-06-06 PROCEDURE — 3609010300 HC COLONOSCOPY W/BIOPSY SINGLE/MULTIPLE: Performed by: INTERNAL MEDICINE

## 2023-06-06 PROCEDURE — 84703 CHORIONIC GONADOTROPIN ASSAY: CPT

## 2023-06-06 PROCEDURE — 7100000010 HC PHASE II RECOVERY - FIRST 15 MIN: Performed by: INTERNAL MEDICINE

## 2023-06-06 PROCEDURE — 2580000003 HC RX 258: Performed by: ANESTHESIOLOGY

## 2023-06-06 PROCEDURE — 88305 TISSUE EXAM BY PATHOLOGIST: CPT

## 2023-06-06 RX ORDER — SODIUM CHLORIDE 9 MG/ML
INJECTION, SOLUTION INTRAVENOUS PRN
Status: DISCONTINUED | OUTPATIENT
Start: 2023-06-06 | End: 2023-06-06 | Stop reason: HOSPADM

## 2023-06-06 RX ORDER — PROPOFOL 10 MG/ML
INJECTION, EMULSION INTRAVENOUS CONTINUOUS PRN
Status: DISCONTINUED | OUTPATIENT
Start: 2023-06-06 | End: 2023-06-06 | Stop reason: SDUPTHER

## 2023-06-06 RX ORDER — SODIUM CHLORIDE 0.9 % (FLUSH) 0.9 %
5-40 SYRINGE (ML) INJECTION EVERY 12 HOURS SCHEDULED
Status: DISCONTINUED | OUTPATIENT
Start: 2023-06-06 | End: 2023-06-06 | Stop reason: HOSPADM

## 2023-06-06 RX ORDER — FENTANYL CITRATE 50 UG/ML
INJECTION, SOLUTION INTRAMUSCULAR; INTRAVENOUS PRN
Status: DISCONTINUED | OUTPATIENT
Start: 2023-06-06 | End: 2023-06-06 | Stop reason: SDUPTHER

## 2023-06-06 RX ORDER — SODIUM CHLORIDE 0.9 % (FLUSH) 0.9 %
5-40 SYRINGE (ML) INJECTION PRN
Status: DISCONTINUED | OUTPATIENT
Start: 2023-06-06 | End: 2023-06-06 | Stop reason: HOSPADM

## 2023-06-06 RX ORDER — PROPOFOL 10 MG/ML
INJECTION, EMULSION INTRAVENOUS PRN
Status: DISCONTINUED | OUTPATIENT
Start: 2023-06-06 | End: 2023-06-06 | Stop reason: SDUPTHER

## 2023-06-06 RX ORDER — LIDOCAINE HYDROCHLORIDE 20 MG/ML
INJECTION, SOLUTION INFILTRATION; PERINEURAL PRN
Status: DISCONTINUED | OUTPATIENT
Start: 2023-06-06 | End: 2023-06-06 | Stop reason: SDUPTHER

## 2023-06-06 RX ORDER — SODIUM CHLORIDE 9 MG/ML
INJECTION, SOLUTION INTRAVENOUS CONTINUOUS PRN
Status: DISCONTINUED | OUTPATIENT
Start: 2023-06-06 | End: 2023-06-06 | Stop reason: SDUPTHER

## 2023-06-06 RX ADMIN — SODIUM CHLORIDE: 9 INJECTION, SOLUTION INTRAVENOUS at 13:45

## 2023-06-06 RX ADMIN — LIDOCAINE HYDROCHLORIDE 80 MG: 20 INJECTION, SOLUTION INFILTRATION; PERINEURAL at 13:52

## 2023-06-06 RX ADMIN — PROPOFOL 180 MCG/KG/MIN: 10 INJECTION, EMULSION INTRAVENOUS at 13:52

## 2023-06-06 RX ADMIN — FENTANYL CITRATE 25 MCG: 50 INJECTION INTRAMUSCULAR; INTRAVENOUS at 13:59

## 2023-06-06 RX ADMIN — PROPOFOL 80 MG: 10 INJECTION, EMULSION INTRAVENOUS at 13:52

## 2023-06-06 ASSESSMENT — PAIN - FUNCTIONAL ASSESSMENT: PAIN_FUNCTIONAL_ASSESSMENT: 0-10

## 2023-06-06 NOTE — H&P
[facility]   Attending Pre-operative History and Physical      PROCEDURE:  colonoscopy. Indication:The patient is a 40 y.o. female presents for a colonoscopy. Past Medical History:    Past Medical History:   Diagnosis Date    Anxiety     Chiari I malformation ([address]     repaired    Chronic migraine without aura     Gastric ulcer     ? from advil    Vocal cord nodules     chronic hoarseness/ voice training no help      Past Surgical History:    Past Surgical History:   Procedure Laterality Date    BRAIN SURGERY  2007    decomp surg/Shabbabian     WISDOM TOOTH EXTRACTION  age 17      Medications Prior to Admission:   Prior to Admission medications    Medication Sig Start Date End Date Taking? Authorizing Provider   Calcium Carbonate-Vit D-Min (CALCIUM 1200 PO) Take by mouth   Yes Historical Provider, MD   Rimegepant Sulfate (NURTEC) 75 MG TBDP Take 75 mg by mouth daily as needed (headache) 4/12/23   Valerie Zuniga MD   sertraline (ZOLOFT) 100 MG tablet Take 1 tablet by mouth daily 10/21/22   Valerie Zuniga MD   Prenatal Vit-DSS-Fe Cbn-FA (PRENATAL ADVANTAGE PO) Take by mouth    Historical Provider, MD        Allergies:  Imitrex [sumatriptan]  History of allergic reaction to anesthesia:  No    Social History:   Social History     Socioeconomic History    Marital status:      Spouse name: Not on file    Number of children: Not on file    Years of education: Not on file    Highest education level: Not on file   Occupational History    Occupation: Sr  Marketing   Tobacco Use    Smoking status: Never    Smokeless tobacco: Never   Vaping Use    Vaping Use: Never used   Substance and Sexual Activity    Alcohol use:  Yes     Alcohol/week: 4.0 standard drinks     Types: 4 Glasses of wine per week     Comment: occassionally    Drug use: No    Sexual activity: Yes     Partners: Male   Other Topics Concern    Not on file   Social History Narrative    Not on file     Social Determinants

## 2023-06-06 NOTE — ANESTHESIA PRE PROCEDURE
Department of Anesthesiology  Preprocedure Note       Name:  Michelle Mccabe   Age:  40 y.o.  :  1985                                          MRN:  [id number]         Date:  2023      Surgeon: Sharee Cross):  Duncan Cole MD    Procedure: Procedure(s):  COLONOSCOPY DIAGNOSTIC    Medications prior to admission:   Prior to Admission medications    Medication Sig Start Date End Date Taking? Authorizing Provider   Calcium Carbonate-Vit D-Min (CALCIUM 1200 PO) Take by mouth   Yes Historical Provider, MD   Rimegepant Sulfate (NURTEC) 75 MG TBDP Take 75 mg by mouth daily as needed (headache) 23   Theron Echols MD   sertraline (ZOLOFT) 100 MG tablet Take 1 tablet by mouth daily 10/21/22   Theron Echols MD   Prenatal Vit-DSS-Fe Cbn-FA (PRENATAL ADVANTAGE PO) Take by mouth    Historical Provider, MD       Current medications:    Current Facility-Administered Medications   Medication Dose Route Frequency Provider Last Rate Last Admin    sodium chloride flush 0.9 % injection 5-40 mL  5-40 mL IntraVENous 2 times per day Charissa Campos MD        sodium chloride flush 0.9 % injection 5-40 mL  5-40 mL IntraVENous PRN Charissa Campos MD        0.9 % sodium chloride infusion   IntraVENous PRN Charissa Campos MD           Allergies:     Allergies   Allergen Reactions    Imitrex [Sumatriptan]      Felt bad       Problem List:    Patient Active Problem List   Diagnosis Code    Allergic rhinitis J30.9    Raynaud disease I73.00    History of gastric ulcer Z87.11    Chiari I malformation ([facility] [address] G93.5    Vocal cord nodules J38.2    Normal labor O80, Z37.9    Term pregnancy Z34.90       Past Medical History:        Diagnosis Date    Anxiety     Chiari I malformation ([facility] [address]     repaired    Chronic migraine without aura     Gastric ulcer     ? from advil    Vocal cord nodules     chronic hoarseness/ voice training no help       Past Surgical History:        Procedure Laterality Date   

## 2023-06-06 NOTE — ANESTHESIA POSTPROCEDURE EVALUATION
Department of Anesthesiology  Postprocedure Note    Patient: Dominik Apodaca  MRN: [id number]  YOB: 1985  Date of evaluation: 6/6/2023      Procedure Summary     Date: 06/06/23 Room / Location: [address] [place] [address]    Anesthesia Start: 9523 Anesthesia Stop: 8083    Procedure: COLONOSCOPY WITH BIOPSY Diagnosis:       Rectal bleeding      (Rectal bleeding)    Surgeons: Allyson Talamantes MD Responsible Provider: Santosh Kirkland MD    Anesthesia Type: MAC ASA Status: 2          Anesthesia Type: No value filed. Morena Phase I: Morena Score: 10    Morena Phase II: Morena Score: 10      Anesthesia Post Evaluation    Patient location during evaluation: PACU  Patient participation: complete - patient participated  Level of consciousness: awake  Airway patency: patent  Nausea & Vomiting: no nausea and no vomiting  Cardiovascular status: blood pressure returned to baseline  Respiratory status: acceptable  Hydration status: stable  Comments: Vital signs stable  OK to discharge from Stage I post anesthesia care.   Care transferred from Anesthesiology department on discharge from perioperative area   Multimodal analgesia pain management approach

## 2023-06-06 NOTE — DISCHARGE INSTRUCTIONS
Discharge Instructions for Colonoscopy     Colonoscopy is a visual exam of the lining of the large intestine, also called the bowel or colon, with a colonoscope. A colonoscope is a flexible tube with a light and a viewing device. It allows the doctor to view the inside of the colon through a tiny video camera. Colonoscopy is performed for many reasons: unexplained anemia , pain, diarrhea , bloody stools, cancer screening, among many other reasons. Complications from a colonoscopy are rare. Some possible serious complications include perforated bowel (which might require surgery) and bleeding (which could require blood transfusion ). Minor complications include bloating, gas, and cramping that can last for 1-2 days after the procedure. Because air is put into your colon during the procedure, it is normal to pass large amounts of air from your rectum. You may not have a bowel movement for 1-3 days after the procedure. What You Will Need:  Someone to drive you home after the procedure     Steps to Take:  [address] when you get home. Because the sedative will make you drowsy, don't drive, operate machinery, or make important decisions the day of the procedure. Feelings of bloating, gas, or cramping may persist for 24 hours. Diet -  Try sips of water first. If tolerated, resume bland food (scrambled eggs, toast, soup) first.  If tolerated, resume regular diet or the diet recommended by your physician. Do not drink alcohol for 24 hours. Physical Activity -  Ask your doctor when you will be able to return to work. Do not drive, operate heavy machinery, or do activities that require coordination or balance for 24 hours. Otherwise, return to your normal routine as soon as you are comfortable to do so, which is usually the next day after the procedure. Medications - When taking medications, it's important to:    Take your medication as directed, not more, not less, not at a different

## 2023-06-06 NOTE — OP NOTE
Colonoscopy Note    Patient:   Gilma Moreno    :    1985    Facility:   [facility] [Outpatient]  Referring/PCP: Javi Gonsalez MD  Procedure:   Colonoscopy   Date:     2023  Endoscopist:  Kaiden Jacome MD, MD    Preoperative Diagnosis:  rectal bleeding. Altered bowel habits. Postoperative Diagnosis: Normal colonoscopy. Normal ileum. Random biopsies obtained to rule out microscopic colitis. Small internal hemorrhoids. Anesthesia: MAC    Estimated blood loss: Minimal    Complications:  None    Specimen: Random biopsies of the colonic mucosa. Instrument:     Description of Procedure:  Informed consent was obtained from the patient after explanation of the procedure including indications, description of the procedure,  benefits and possible risks and complications of the procedure, and alternatives. Questions were answered. The patient's history was reviewed and a directed physical examination was performed prior to the procedure. Patient was monitored throughout the procedure with pulse oximetry and periodic assessment of vital signs. Patient was sedated as noted above. With the patient initially in the left lateral decubitus position, a digital rectal examination was performed and revealed negative without mass, lesions or tenderness. The Olympus video colonoscope was placed in the patient's rectum and advanced without difficulty  to the cecum, which was identified by the ileocecal valve and appendiceal orifice. The prep was excellent. Examination of the mucosa was performed during both introduction and withdrawal of the colonoscope. Retroflexed view of the rectum was performed. Findings: The mucosa throughout the colon is normal.  There was no endoscopic evidence of colitis. The mucosa in the ileum is also normal.  There were no polyps. Random biopsies were obtained to rule out microscopic colitis.   Small internal hemorrhoids were

## 2023-06-08 LAB — HCG UR QL: NEGATIVE

## 2024-01-04 ENCOUNTER — OFFICE VISIT (OUTPATIENT)
Dept: ENT CLINIC | Age: 39
End: 2024-01-04
Payer: COMMERCIAL

## 2024-01-04 VITALS
HEART RATE: 88 BPM | DIASTOLIC BLOOD PRESSURE: 61 MMHG | WEIGHT: 123 LBS | OXYGEN SATURATION: 100 % | HEIGHT: 62 IN | SYSTOLIC BLOOD PRESSURE: 103 MMHG | BODY MASS INDEX: 22.63 KG/M2

## 2024-01-04 DIAGNOSIS — H66.90 ACUTE OTITIS MEDIA, UNSPECIFIED OTITIS MEDIA TYPE: Primary | ICD-10-CM

## 2024-01-04 DIAGNOSIS — H69.93 DYSFUNCTION OF BOTH EUSTACHIAN TUBES: ICD-10-CM

## 2024-01-04 DIAGNOSIS — U07.1 COVID: ICD-10-CM

## 2024-01-04 PROCEDURE — 99203 OFFICE O/P NEW LOW 30 MIN: CPT | Performed by: OTOLARYNGOLOGY

## 2024-01-04 NOTE — PROGRESS NOTES
of both eustachian tubes  The patient probably has some lingering eustachian tube dysfunction.  It is only been 2 or 3 weeks and I explained to her that after an ear infection you can have the eustachian tube dysfunction for quite a while before it is completely gone.  I do not think she needs any more antibiotics.  I did tell her to  over-the-counter budesonide nasal spray as it is a category B for pregnancy.  This could help alleviate the eustachian dysfunction quicker.  If this persist after pregnancy we could consider an ear tube, but I think that would be overkill at this point.    3. COVID  Most of her COVID symptoms have resolved.             I have performed a head and neck physical exam personally or was physically present during the key or critical portions of the service.    This note was generated completely or in part utilizing Dragon dictation speech recognition software.  Occasionally, words are mistranscribed and despite editing, the text may contain inaccuracies due to incorrect word recognition.  If further clarification is needed please contact the office at [phone].

## 2024-01-23 ENCOUNTER — HOSPITAL ENCOUNTER (OUTPATIENT)
Dept: PHYSICAL THERAPY | Age: 39
Setting detail: THERAPIES SERIES
Discharge: HOME | End: 2024-01-23
Payer: COMMERCIAL

## 2024-01-23 PROCEDURE — 97110 THERAPEUTIC EXERCISES: CPT | Performed by: PHYSICAL THERAPIST

## 2024-01-23 PROCEDURE — 97162 PT EVAL MOD COMPLEX 30 MIN: CPT | Performed by: PHYSICAL THERAPIST

## 2024-01-23 PROCEDURE — 97112 NEUROMUSCULAR REEDUCATION: CPT | Performed by: PHYSICAL THERAPIST

## 2024-01-23 PROCEDURE — 97140 MANUAL THERAPY 1/> REGIONS: CPT | Performed by: PHYSICAL THERAPIST

## 2024-01-23 PROCEDURE — 97530 THERAPEUTIC ACTIVITIES: CPT | Performed by: PHYSICAL THERAPIST

## 2024-01-23 NOTE — PROGRESS NOTES
Other conditions  []Fibromyalgia (M79.7)  []Vertigo  []Syncope  []Kidney Failure  []Cancer      []currently undergoing                treatment  [x]Pregnancy  []Incontinence   Prior surgeries  []involved limb  []previous spinal surgery  [] section birth  []hysterectomy  []bowel / bladder surgery  [x]other relevant surgeries - Chiari decompression surgery -   []Other:              Barriers to/and or personal factors that will affect rehab potential:              [x]Age  [x]Sex   []Smoker              []Motivation/Lack of Motivation                        [x]Co-Morbidities              []Cognitive Function, education/learning barriers              []Environmental, home barriers              [x]profession/work barriers - prolonged standing during training sessions  []past PT/medical experience  []other:  Justification: Patient demonstrates interest in and motivation for maximizing potential for improved PF muscle and bladder control.     Falls Risk Assessment (30 days):   [x] Falls Risk assessed and no intervention required.  [] Falls Risk assessed and Patient requires intervention due to being higher risk   TUG score (>12s at risk):     [] Falls education provided, including         ASSESSMENT:      Patient presents to PF PT with complaints of L buttocks pain radiating down to back of knee - using massage gun which relieves pain for about 10 minutes. Has varicose vein externally on L side of perineum - pelvic pressure started in November.  Sciatica pain began in early December. Wants to prepare for upcoming birth. Sleeping on L side with pillow between knees, generally sleeping on L side.      Pain Scale: 8-9/10 with prolonged standing, at best 3-4/10  Easing factors: rest - sitting and lying down  Provocative factors: prolonged standing  Type: [x]Constant       []Intermittent  []Radiating     []Localized     []other:     Numbness/Tingling: denies     Severity of problem: 8-9/10  - limits from holding

## 2024-01-24 NOTE — PROGRESS NOTES
[facility] - Outpatient Physical Therapy  [address] [place] [address]  Phone: [phone]  Fax: [phone]        Physical Therapy Daily Treatment Note    Date: 2024    Patient Name: Larissa Miranda : 1985 MRN: [id number]    Restrictions/Precautions:    Medical/Treatment Diagnosis Information:    Medical Diagnosis:  Sciatica, unspecified side [M54.30]       Insurance/Certification information: PT Insurance Information: [facility] PPO - $40 co-pay, 40 visits/calendar year    Physician Information: Maria Burleson MD    Plan of care signed (Y/N): Y  Signed by Maria Burleson MD on 2024 via returned fax    Visit# / total visits: 2/10 (estimated)       Time in: 1300  Time Out: 1400  Total Treatment Time: 60 minutes    Charges:  Therapeutic Activity (15992)x2    Therapeutic Exercise (33554)x1    Manual (40544)x1    Neuromuscular Re-ed/Facilitation (98447)        ________________________________________________________________________________________    Pain Level: 4-5/10 - consistent L sciatica pain    SUBJECTIVE:   Patient reports continues with L sciatica pain, increased discomfort in pelvis/hip yesterday by carrying her son around more since he is teething.     Due date 3/27 (~5 weeks) - approaching delivery soon - delivered other 2 children ~ 2 weeks early.     When performs exercises/stretches, notices improvements.  Feels better for about 10 minutes after using massage gun.  Notices improvement when she lays down and elevates feet.     No questions about any of her exercises. Performing exercises about 1-2 times.     Wishes to focus manual interventions in preparation for upcoming birth and to address manual interventions to help with sciatica pain.       OBJECTIVE:      Treatment Interventions Implemented     Exercise/Neuromuscular Re-education - Written HEP instructions provided and reviewed     Diaphragmatic breathing - cues for 360* breathing - performing

## 2024-01-29 ENCOUNTER — OFFICE VISIT (OUTPATIENT)
Dept: DERMATOLOGY | Age: 39
End: 2024-01-29

## 2024-01-29 DIAGNOSIS — D22.9 MULTIPLE NEVI: Primary | ICD-10-CM

## 2024-01-29 DIAGNOSIS — D22.5 IRRITATED NEVUS OF BACK: ICD-10-CM

## 2024-01-29 DIAGNOSIS — L82.1 SK (SEBORRHEIC KERATOSIS): ICD-10-CM

## 2024-01-29 NOTE — PATIENT INSTRUCTIONS

## 2024-01-29 NOTE — PROGRESS NOTES
[facility]  Nicolas Pyle MD  [phone]      Larissa Miranda  1985    38 y.o. female     Date of Visit: 1/29/2024    Chief Complaint: skin moles    History of Present Illness:    1.  She presents today for evaluation of multiple moles - not aware of any changes in size, color or shape.       2.  1 mole on the left upper back has become more irritated and inflamed over time.  Rubs against clothing/bra.     3.  She reports multiple asymptomatic growths below the breasts.    She has a family history of melanoma-her sister and father both have a history of melanoma in situ.     Pregnant - due next month.    Review of Systems:  Gen: Feels well, good sense of health.    Past Medical History, Family History, Surgical History, Medications and Allergies reviewed.    Past Medical History:   Diagnosis Date    Anxiety     Chiari I malformation (HCC)     repaired    Chronic migraine without aura     Gastric ulcer     ? from advil    Vocal cord nodules     chronic hoarseness/ voice training no help     Past Surgical History:   Procedure Laterality Date    BRAIN SURGERY  2007    decomp surg/Shabbabian     COLONOSCOPY N/A 06/06/2023    Ghastine- Normal    WISDOM TOOTH EXTRACTION  age 18       Allergies   Allergen Reactions    Imitrex [Sumatriptan]      Jefferson bad     Outpatient Medications Marked as Taking for the 1/29/24 encounter (Office Visit) with Nicolas Pyle MD   Medication Sig Dispense Refill    Ferrous Sulfate (IRON PO) Take by mouth      sertraline (ZOLOFT) 100 MG tablet TAKE ONE TABLET BY MOUTH DAILY 90 tablet 3    Prenatal Vit-DSS-Fe Cbn-FA (PRENATAL ADVANTAGE PO) Take by mouth         Social History:  Occupation:  has 2 kids, current pregnant with 3rd.      Physical Examination       The following were examined and determined to be normal: Psych/Neuro, Scalp/hair, Head/face, Conjunctivae/eyelids, Gums/teeth/lips, Neck, Breast/axilla/chest, Abdomen, RUE, LUE, RLE, LLE, and

## 2024-02-06 ENCOUNTER — TELEPHONE (OUTPATIENT)
Dept: DERMATOLOGY | Age: 39
End: 2024-02-06

## 2024-02-20 ENCOUNTER — HOSPITAL ENCOUNTER (OUTPATIENT)
Dept: PHYSICAL THERAPY | Age: 39
Setting detail: THERAPIES SERIES
Discharge: HOME | End: 2024-02-20
Payer: COMMERCIAL

## 2024-02-20 PROCEDURE — 97140 MANUAL THERAPY 1/> REGIONS: CPT | Performed by: PHYSICAL THERAPIST

## 2024-02-20 PROCEDURE — 97530 THERAPEUTIC ACTIVITIES: CPT | Performed by: PHYSICAL THERAPIST

## 2024-02-20 PROCEDURE — 97110 THERAPEUTIC EXERCISES: CPT | Performed by: PHYSICAL THERAPIST

## 2024-02-21 NOTE — PROGRESS NOTES
pain radiating into LLE to indicate improvement in pelvic floor strength/motor control and relaxation, muscle coordination, improved intraabdominal/intrapelvic pressures, and/or bladder retraining.  [x] Progressing: [] Met: [] Not Met: [] Adjusted  2. Perform HEP for pelvic floor and core muscle groups with minimal direction from therapist as she progresses to become more independent managing her intraabdominal/intrapelvic pressure and PF and surrounding core muscle weakness and/or tightness.  [x] Progressing: [] Met: [] Not Met: [] Adjusted  3. Report using 1-2 behavioral modification strategies to reduce urinary/bowel complaints through dietary and mechanical changes, with focus on full emptying of bladder with each urination and using optimal positioning and deep breathing for relaxation of posterior PF muscles during defacation, reducing need to strain.  [x] Progressing: [] Met: [] Not Met: [] Adjusted     Long Term Goals: To be achieved in: 8-10 sessions  Patient will report improvement in sciatic pain to generally 3-4/10 or less  with improved ability to self manage symptoms with various exercises and positional recommendations.   [] Progressing: [] Met: [] Not Met: [] Adjusted  2. Patient will return to functional activities including bending and lifting for household chores, caring for young children and working with need for prolonged standing without significantly increased symptoms or restriction.   [] Progressing: [] Met: [] Not Met: [] Adjusted  3. Patient will report decrease pain and pressure in pelvic region with consistently implementation of positional strategies to improve blood flow/venous return in pelvic/perineal area.     [] Progressing: [] Met: [] Not Met: [] Adjusted                   4. Rate severity of the problem related to pelvic pressure and pain in L buttocks and radiating into L leg due to sciatica to 3-4 or less on scale of 0-10 with 0 being no problem and 10 being the worst -

## 2024-02-28 ENCOUNTER — HOSPITAL ENCOUNTER (OUTPATIENT)
Dept: PHYSICAL THERAPY | Age: 39
Setting detail: THERAPIES SERIES
Discharge: HOME | End: 2024-02-28
Payer: COMMERCIAL

## 2024-03-12 ENCOUNTER — HOSPITAL ENCOUNTER (INPATIENT)
Age: 39
LOS: 1 days | Discharge: HOME | End: 2024-03-13
Attending: OBSTETRICS & GYNECOLOGY | Admitting: OBSTETRICS & GYNECOLOGY
Payer: COMMERCIAL

## 2024-03-12 ENCOUNTER — ANESTHESIA EVENT (OUTPATIENT)
Dept: LABOR AND DELIVERY | Age: 39
End: 2024-03-12
Payer: COMMERCIAL

## 2024-03-12 ENCOUNTER — ANESTHESIA (OUTPATIENT)
Dept: LABOR AND DELIVERY | Age: 39
End: 2024-03-12
Payer: COMMERCIAL

## 2024-03-12 LAB
ABO + RH BLD: NORMAL
AMPHETAMINES UR QL SCN>1000 NG/ML: NORMAL
BARBITURATES UR QL SCN>200 NG/ML: NORMAL
BENZODIAZ UR QL SCN>200 NG/ML: NORMAL
BLD GP AB SCN SERPL QL: NORMAL
BUPRENORPHINE+NOR UR QL SCN: NORMAL
CANNABINOIDS UR QL SCN>50 NG/ML: NORMAL
COCAINE UR QL SCN: NORMAL
DEPRECATED RDW RBC AUTO: 12.7 % (ref 12.4–15.4)
DRUG SCREEN COMMENT UR-IMP: NORMAL
FENTANYL SCREEN, URINE: NORMAL
HCT VFR BLD AUTO: 34.3 % (ref 36–48)
HGB BLD-MCNC: 12.2 G/DL (ref 12–16)
MCH RBC QN AUTO: 31.7 PG (ref 26–34)
MCHC RBC AUTO-ENTMCNC: 35.4 G/DL (ref 31–36)
MCV RBC AUTO: 89.3 FL (ref 80–100)
METHADONE UR QL SCN>300 NG/ML: NORMAL
OPIATES UR QL SCN>300 NG/ML: NORMAL
OXYCODONE UR QL SCN: NORMAL
PCP UR QL SCN>25 NG/ML: NORMAL
PH UR STRIP: 8 [PH]
PLATELET # BLD AUTO: 296 K/UL (ref 135–450)
PMV BLD AUTO: 7.9 FL (ref 5–10.5)
RBC # BLD AUTO: 3.84 M/UL (ref 4–5.2)
REAGIN+T PALLIDUM IGG+IGM SERPL-IMP: NORMAL
WBC # BLD AUTO: 11.6 K/UL (ref 4–11)

## 2024-03-12 PROCEDURE — 10907ZC DRAINAGE OF AMNIOTIC FLUID, THERAPEUTIC FROM PRODUCTS OF CONCEPTION, VIA NATURAL OR ARTIFICIAL OPENING: ICD-10-PCS | Performed by: OBSTETRICS & GYNECOLOGY

## 2024-03-12 PROCEDURE — 85027 COMPLETE CBC AUTOMATED: CPT

## 2024-03-12 PROCEDURE — 51701 INSERT BLADDER CATHETER: CPT

## 2024-03-12 PROCEDURE — 2500000003 HC RX 250 WO HCPCS

## 2024-03-12 PROCEDURE — 2500000003 HC RX 250 WO HCPCS: Performed by: NURSE ANESTHETIST, CERTIFIED REGISTERED

## 2024-03-12 PROCEDURE — 3E033VJ INTRODUCTION OF OTHER HORMONE INTO PERIPHERAL VEIN, PERCUTANEOUS APPROACH: ICD-10-PCS | Performed by: OBSTETRICS & GYNECOLOGY

## 2024-03-12 PROCEDURE — 7200000001 HC VAGINAL DELIVERY

## 2024-03-12 PROCEDURE — 86780 TREPONEMA PALLIDUM: CPT

## 2024-03-12 PROCEDURE — 86850 RBC ANTIBODY SCREEN: CPT

## 2024-03-12 PROCEDURE — 6370000000 HC RX 637 (ALT 250 FOR IP): Performed by: OBSTETRICS & GYNECOLOGY

## 2024-03-12 PROCEDURE — 86900 BLOOD TYPING SEROLOGIC ABO: CPT

## 2024-03-12 PROCEDURE — 3700000025 EPIDURAL BLOCK: Performed by: ANESTHESIOLOGY

## 2024-03-12 PROCEDURE — 86901 BLOOD TYPING SEROLOGIC RH(D): CPT

## 2024-03-12 PROCEDURE — 1220000000 HC SEMI PRIVATE OB R&B

## 2024-03-12 PROCEDURE — 80307 DRUG TEST PRSMV CHEM ANLYZR: CPT

## 2024-03-12 PROCEDURE — 59025 FETAL NON-STRESS TEST: CPT

## 2024-03-12 PROCEDURE — 0KQM0ZZ REPAIR PERINEUM MUSCLE, OPEN APPROACH: ICD-10-PCS | Performed by: OBSTETRICS & GYNECOLOGY

## 2024-03-12 PROCEDURE — 2580000003 HC RX 258: Performed by: OBSTETRICS & GYNECOLOGY

## 2024-03-12 PROCEDURE — 6360000002 HC RX W HCPCS: Performed by: OBSTETRICS & GYNECOLOGY

## 2024-03-12 RX ORDER — IBUPROFEN 800 MG/1
800 TABLET ORAL EVERY 8 HOURS
Status: DISCONTINUED | OUTPATIENT
Start: 2024-03-12 | End: 2024-03-13 | Stop reason: HOSPADM

## 2024-03-12 RX ORDER — SODIUM CHLORIDE 0.9 % (FLUSH) 0.9 %
5-40 SYRINGE (ML) INJECTION EVERY 12 HOURS SCHEDULED
Status: DISCONTINUED | OUTPATIENT
Start: 2024-03-12 | End: 2024-03-13 | Stop reason: HOSPADM

## 2024-03-12 RX ORDER — LANOLIN 100 %
OINTMENT (GRAM) TOPICAL PRN
Status: DISCONTINUED | OUTPATIENT
Start: 2024-03-12 | End: 2024-03-13 | Stop reason: HOSPADM

## 2024-03-12 RX ORDER — SODIUM CHLORIDE 0.9 % (FLUSH) 0.9 %
5-40 SYRINGE (ML) INJECTION EVERY 12 HOURS SCHEDULED
Status: DISCONTINUED | OUTPATIENT
Start: 2024-03-12 | End: 2024-03-12 | Stop reason: SDUPTHER

## 2024-03-12 RX ORDER — LIDOCAINE HYDROCHLORIDE AND EPINEPHRINE 15; 5 MG/ML; UG/ML
INJECTION, SOLUTION EPIDURAL PRN
Status: DISCONTINUED | OUTPATIENT
Start: 2024-03-12 | End: 2024-03-12 | Stop reason: SDUPTHER

## 2024-03-12 RX ORDER — DOCUSATE SODIUM 100 MG/1
100 CAPSULE, LIQUID FILLED ORAL 2 TIMES DAILY
Status: DISCONTINUED | OUTPATIENT
Start: 2024-03-12 | End: 2024-03-13 | Stop reason: HOSPADM

## 2024-03-12 RX ORDER — NALBUPHINE HYDROCHLORIDE 10 MG/ML
5 INJECTION, SOLUTION INTRAMUSCULAR; INTRAVENOUS; SUBCUTANEOUS EVERY 4 HOURS PRN
Status: DISCONTINUED | OUTPATIENT
Start: 2024-03-12 | End: 2024-03-12 | Stop reason: HOSPADM

## 2024-03-12 RX ORDER — ONDANSETRON 2 MG/ML
4 INJECTION INTRAMUSCULAR; INTRAVENOUS EVERY 6 HOURS PRN
Status: DISCONTINUED | OUTPATIENT
Start: 2024-03-12 | End: 2024-03-12 | Stop reason: ALTCHOICE

## 2024-03-12 RX ORDER — OXYCODONE HYDROCHLORIDE 10 MG/1
10 TABLET ORAL EVERY 4 HOURS PRN
Status: DISCONTINUED | OUTPATIENT
Start: 2024-03-12 | End: 2024-03-13 | Stop reason: HOSPADM

## 2024-03-12 RX ORDER — METHYLERGONOVINE MALEATE 0.2 MG/ML
200 INJECTION INTRAVENOUS PRN
Status: DISCONTINUED | OUTPATIENT
Start: 2024-03-12 | End: 2024-03-13 | Stop reason: HOSPADM

## 2024-03-12 RX ORDER — ONDANSETRON 4 MG/1
4 TABLET, ORALLY DISINTEGRATING ORAL EVERY 6 HOURS PRN
Status: DISCONTINUED | OUTPATIENT
Start: 2024-03-12 | End: 2024-03-13 | Stop reason: HOSPADM

## 2024-03-12 RX ORDER — ACETAMINOPHEN 500 MG
1000 TABLET ORAL EVERY 8 HOURS SCHEDULED
Status: DISCONTINUED | OUTPATIENT
Start: 2024-03-12 | End: 2024-03-13 | Stop reason: HOSPADM

## 2024-03-12 RX ORDER — MISOPROSTOL 200 UG/1
400 TABLET ORAL PRN
Status: DISCONTINUED | OUTPATIENT
Start: 2024-03-12 | End: 2024-03-13 | Stop reason: HOSPADM

## 2024-03-12 RX ORDER — SODIUM CHLORIDE, SODIUM LACTATE, POTASSIUM CHLORIDE, AND CALCIUM CHLORIDE .6; .31; .03; .02 G/100ML; G/100ML; G/100ML; G/100ML
500 INJECTION, SOLUTION INTRAVENOUS PRN
Status: DISCONTINUED | OUTPATIENT
Start: 2024-03-12 | End: 2024-03-12 | Stop reason: ALTCHOICE

## 2024-03-12 RX ORDER — SODIUM CHLORIDE 0.9 % (FLUSH) 0.9 %
5-40 SYRINGE (ML) INJECTION PRN
Status: DISCONTINUED | OUTPATIENT
Start: 2024-03-12 | End: 2024-03-12 | Stop reason: SDUPTHER

## 2024-03-12 RX ORDER — NALOXONE HYDROCHLORIDE 0.4 MG/ML
INJECTION, SOLUTION INTRAMUSCULAR; INTRAVENOUS; SUBCUTANEOUS PRN
Status: DISCONTINUED | OUTPATIENT
Start: 2024-03-12 | End: 2024-03-12 | Stop reason: HOSPADM

## 2024-03-12 RX ORDER — SODIUM CHLORIDE, SODIUM LACTATE, POTASSIUM CHLORIDE, CALCIUM CHLORIDE 600; 310; 30; 20 MG/100ML; MG/100ML; MG/100ML; MG/100ML
INJECTION, SOLUTION INTRAVENOUS CONTINUOUS
Status: DISCONTINUED | OUTPATIENT
Start: 2024-03-12 | End: 2024-03-13 | Stop reason: HOSPADM

## 2024-03-12 RX ORDER — SODIUM CHLORIDE 0.9 % (FLUSH) 0.9 %
5-40 SYRINGE (ML) INJECTION PRN
Status: DISCONTINUED | OUTPATIENT
Start: 2024-03-12 | End: 2024-03-13 | Stop reason: HOSPADM

## 2024-03-12 RX ORDER — ONDANSETRON 2 MG/ML
4 INJECTION INTRAMUSCULAR; INTRAVENOUS EVERY 6 HOURS PRN
Status: DISCONTINUED | OUTPATIENT
Start: 2024-03-12 | End: 2024-03-12 | Stop reason: HOSPADM

## 2024-03-12 RX ORDER — CARBOPROST TROMETHAMINE 250 UG/ML
250 INJECTION, SOLUTION INTRAMUSCULAR PRN
Status: DISCONTINUED | OUTPATIENT
Start: 2024-03-12 | End: 2024-03-13 | Stop reason: HOSPADM

## 2024-03-12 RX ORDER — SODIUM CHLORIDE, SODIUM LACTATE, POTASSIUM CHLORIDE, AND CALCIUM CHLORIDE .6; .31; .03; .02 G/100ML; G/100ML; G/100ML; G/100ML
1000 INJECTION, SOLUTION INTRAVENOUS PRN
Status: DISCONTINUED | OUTPATIENT
Start: 2024-03-12 | End: 2024-03-12 | Stop reason: ALTCHOICE

## 2024-03-12 RX ORDER — TERBUTALINE SULFATE 1 MG/ML
0.25 INJECTION, SOLUTION SUBCUTANEOUS
Status: DISCONTINUED | OUTPATIENT
Start: 2024-03-12 | End: 2024-03-12 | Stop reason: ALTCHOICE

## 2024-03-12 RX ORDER — SODIUM CHLORIDE 9 MG/ML
INJECTION, SOLUTION INTRAVENOUS PRN
Status: DISCONTINUED | OUTPATIENT
Start: 2024-03-12 | End: 2024-03-13 | Stop reason: HOSPADM

## 2024-03-12 RX ORDER — ONDANSETRON 2 MG/ML
4 INJECTION INTRAMUSCULAR; INTRAVENOUS EVERY 6 HOURS PRN
Status: DISCONTINUED | OUTPATIENT
Start: 2024-03-12 | End: 2024-03-12 | Stop reason: SDUPTHER

## 2024-03-12 RX ORDER — SODIUM CHLORIDE 9 MG/ML
25 INJECTION, SOLUTION INTRAVENOUS PRN
Status: DISCONTINUED | OUTPATIENT
Start: 2024-03-12 | End: 2024-03-12 | Stop reason: ALTCHOICE

## 2024-03-12 RX ORDER — OXYCODONE HYDROCHLORIDE 5 MG/1
5 TABLET ORAL EVERY 4 HOURS PRN
Status: DISCONTINUED | OUTPATIENT
Start: 2024-03-12 | End: 2024-03-13 | Stop reason: HOSPADM

## 2024-03-12 RX ADMIN — Medication 87.3 MILLI-UNITS/MIN: at 13:51

## 2024-03-12 RX ADMIN — Medication 166.7 ML: at 13:52

## 2024-03-12 RX ADMIN — LIDOCAINE HYDROCHLORIDE AND EPINEPHRINE 3 ML: 15; 5 INJECTION, SOLUTION EPIDURAL at 08:46

## 2024-03-12 RX ADMIN — ACETAMINOPHEN 1000 MG: 500 TABLET ORAL at 16:27

## 2024-03-12 RX ADMIN — Medication 1 MILLI-UNITS/MIN: at 09:46

## 2024-03-12 RX ADMIN — Medication 2.5 MILLION UNITS: at 10:01

## 2024-03-12 RX ADMIN — SODIUM CHLORIDE, POTASSIUM CHLORIDE, SODIUM LACTATE AND CALCIUM CHLORIDE: 600; 310; 30; 20 INJECTION, SOLUTION INTRAVENOUS at 06:17

## 2024-03-12 RX ADMIN — DEXTROSE MONOHYDRATE 5 MILLION UNITS: 5 INJECTION INTRAVENOUS at 06:17

## 2024-03-12 ASSESSMENT — PAIN DESCRIPTION - ONSET: ONSET: PROGRESSIVE

## 2024-03-12 ASSESSMENT — PAIN DESCRIPTION - FREQUENCY: FREQUENCY: INTERMITTENT

## 2024-03-12 ASSESSMENT — PAIN DESCRIPTION - DESCRIPTORS
DESCRIPTORS: CRAMPING
DESCRIPTORS: CRAMPING
DESCRIPTORS: DULL;ACHING

## 2024-03-12 ASSESSMENT — PAIN - FUNCTIONAL ASSESSMENT: PAIN_FUNCTIONAL_ASSESSMENT: ACTIVITIES ARE NOT PREVENTED

## 2024-03-12 ASSESSMENT — PAIN SCALES - GENERAL
PAINLEVEL_OUTOF10: 0
PAINLEVEL_OUTOF10: 0
PAINLEVEL_OUTOF10: 1

## 2024-03-12 ASSESSMENT — PAIN DESCRIPTION - LOCATION: LOCATION: BACK

## 2024-03-12 ASSESSMENT — PAIN DESCRIPTION - ORIENTATION: ORIENTATION: LOWER

## 2024-03-12 ASSESSMENT — PAIN DESCRIPTION - PAIN TYPE: TYPE: ACUTE PAIN

## 2024-03-12 NOTE — PROGRESS NOTES
RN to bedside with Dr. Rodríguez VE, forebag noted and ruptured with exam, 5/100/-1 per Dr. Rodríguez.  Linens changed.

## 2024-03-12 NOTE — FLOWSHEET NOTE
Bedside handoff completed.  All questions answered.  Orders reviewed.  Patient included in discussion regarding plan of care. Report received from GILBERTO Jennings RN.

## 2024-03-12 NOTE — ANESTHESIA PROCEDURE NOTES
evaluation, timeout performed, anesthesia consent given, oxygen available, monitors applied/VS acknowledged, fire risk safety assessment completed and verbalized and blood product R/B/A discussed and consented

## 2024-03-12 NOTE — ANESTHESIA PRE PROCEDURE
She was seen, examined and her chart was reviewed (including anesthesia, drug and allergy history).  No interval changes are noted to her history and physical examination. (except as noted above).    Risks/benefits/alternatives of both neuraxial and general anesthesia were discussed and she agrees to proceed at the direction of the care team.    MANUELA Alejo - CRNA  March 12, 2024  6:52 AM     MANUELA Alejo CRNA   3/12/2024

## 2024-03-12 NOTE — FLOWSHEET NOTE
.Pt assisted OOB with 2 assist to bathroom. Pt able to void. Avril instruction given and demonstrated. Pt verbalized understanding of instructions. Epidural removed with tip intact. IV infusing. Mother assisted to wheelchair and infant placed in mothers arms. Both transferred to  room. Pt oriented to new room, infant bassinet,  care. Call light in reach. Pt denies needs at this time.

## 2024-03-12 NOTE — FLOWSHEET NOTE
Bedside handoff completed.  All questions answered.  Orders reviewed.  Patient included in discussion regarding plan of care.  Report given to ROBERTO RN.

## 2024-03-12 NOTE — FLOWSHEET NOTE
Patient presented to triage c/o leaking and cramping.  Dr. Montemayor to room for amniosure; patient positive.  Admission assessment done and patient placed on monitors in room 2285. IV started and labs sent. Consents signed. Pt and fob oriented to room.

## 2024-03-12 NOTE — ANESTHESIA POSTPROCEDURE EVALUATION
Department of Anesthesiology  Postprocedure Note    Patient: Larissa Miranda  MRN: [id number]  YOB: 1985  Date of evaluation: 3/12/2024    Procedure Summary       Date: 03/12/24 Room / Location:     Anesthesia Start: 0836 Anesthesia Stop: 1347    Procedure: Labor Analgesia Diagnosis:     Scheduled Providers:  Responsible Provider: Harsha Davila MD    Anesthesia Type: epidural ASA Status: 2            Anesthesia Type: No value filed.    Morena Phase I:      Morena Phase II:      Anesthesia Post Evaluation    Patient location during evaluation: floor  Patient participation: complete - patient participated  Level of consciousness: awake and alert  Pain score: 0  Airway patency: patent  Nausea & Vomiting: no vomiting and no nausea  Cardiovascular status: blood pressure returned to baseline and hemodynamically stable  Respiratory status: acceptable and room air  Hydration status: stable  Pain management: adequate and satisfactory to patient        No notable events documented.

## 2024-03-12 NOTE — H&P
Department of Obstetrics and Gynecology   Obstetrics History and Physical        CHIEF COMPLAINT:  leakage of amniotic fluid    HISTORY OF PRESENT ILLNESS:    Larissa Miranda  is a 38 y.o.  female at 37w6d presents with a chief complaint as above and is being admitted for early latent labor    Estimated Due Date: Estimated Date of Delivery: 3/27/24    PRENATAL CARE: Complicated by: AMA, marginal cord insertion    PAST OB HISTORY:  OB History          3    Para   2    Term   2            AB        Living   2         SAB        IAB        Ectopic        Molar        Multiple   0    Live Births   2              Past Medical History:        Diagnosis Date    Anxiety     Chiari I malformation (HCC)     repaired    Gastric ulcer     ? from advil    Vocal cord nodules     chronic hoarseness/ voice training no help     Past Surgical History:        Procedure Laterality Date    BRAIN SURGERY      decomp surg/Shabbabian     COLONOSCOPY N/A 2023    Ghastine- Normal    WISDOM TOOTH EXTRACTION  age 18     Allergies:  Imitrex [sumatriptan]  Social History:    Social History     Socioeconomic History    Marital status:      Spouse name: Not on file    Number of children: Not on file    Years of education: Not on file    Highest education level: Not on file   Occupational History    Occupation: Sr  Marketing   Tobacco Use    Smoking status: Never    Smokeless tobacco: Never   Vaping Use    Vaping Use: Never used   Substance and Sexual Activity    Alcohol use: Not Currently     Alcohol/week: 4.0 standard drinks of alcohol     Types: 4 Glasses of wine per week     Comment: occassionally    Drug use: No    Sexual activity: Yes     Partners: Male   Other Topics Concern    Not on file   Social History Narrative    Not on file     Social Determinants of Health     Financial Resource Strain: Low Risk  (2023)    Overall Financial Resource Strain (CARDIA)     Difficulty of Paying Living

## 2024-03-13 VITALS
RESPIRATION RATE: 18 BRPM | OXYGEN SATURATION: 97 % | HEART RATE: 86 BPM | SYSTOLIC BLOOD PRESSURE: 114 MMHG | DIASTOLIC BLOOD PRESSURE: 80 MMHG | TEMPERATURE: 98 F | HEIGHT: 62 IN | BODY MASS INDEX: 24.29 KG/M2 | WEIGHT: 132 LBS

## 2024-03-13 PROCEDURE — 6370000000 HC RX 637 (ALT 250 FOR IP): Performed by: OBSTETRICS & GYNECOLOGY

## 2024-03-13 PROCEDURE — 2580000003 HC RX 258: Performed by: OBSTETRICS & GYNECOLOGY

## 2024-03-13 RX ORDER — PSEUDOEPHEDRINE HCL 30 MG
100 TABLET ORAL 2 TIMES DAILY
Qty: 60 CAPSULE | Refills: 1 | Status: SHIPPED | OUTPATIENT
Start: 2024-03-13

## 2024-03-13 RX ORDER — IBUPROFEN 800 MG/1
800 TABLET ORAL EVERY 8 HOURS
Qty: 120 TABLET | Refills: 3 | Status: SHIPPED | OUTPATIENT
Start: 2024-03-13

## 2024-03-13 RX ADMIN — IBUPROFEN 800 MG: 800 TABLET, FILM COATED ORAL at 04:14

## 2024-03-13 RX ADMIN — Medication 10 ML: at 00:24

## 2024-03-13 RX ADMIN — IBUPROFEN 800 MG: 800 TABLET, FILM COATED ORAL at 12:14

## 2024-03-13 RX ADMIN — ACETAMINOPHEN 1000 MG: 500 TABLET ORAL at 09:45

## 2024-03-13 RX ADMIN — ACETAMINOPHEN 1000 MG: 500 TABLET ORAL at 00:22

## 2024-03-13 RX ADMIN — DOCUSATE SODIUM 100 MG: 100 CAPSULE, LIQUID FILLED ORAL at 09:45

## 2024-03-13 RX ADMIN — DOCUSATE SODIUM 100 MG: 100 CAPSULE, LIQUID FILLED ORAL at 00:22

## 2024-03-13 ASSESSMENT — PAIN SCALES - GENERAL
PAINLEVEL_OUTOF10: 1
PAINLEVEL_OUTOF10: 3
PAINLEVEL_OUTOF10: 1
PAINLEVEL_OUTOF10: 3

## 2024-03-13 ASSESSMENT — PAIN - FUNCTIONAL ASSESSMENT
PAIN_FUNCTIONAL_ASSESSMENT: ACTIVITIES ARE NOT PREVENTED

## 2024-03-13 ASSESSMENT — PAIN DESCRIPTION - LOCATION
LOCATION: ABDOMEN
LOCATION: BACK

## 2024-03-13 ASSESSMENT — PAIN DESCRIPTION - FREQUENCY
FREQUENCY: INTERMITTENT
FREQUENCY: INTERMITTENT

## 2024-03-13 ASSESSMENT — PAIN DESCRIPTION - DESCRIPTORS
DESCRIPTORS: SORE
DESCRIPTORS: CRAMPING
DESCRIPTORS: SORE
DESCRIPTORS: SORE

## 2024-03-13 ASSESSMENT — PAIN DESCRIPTION - ONSET: ONSET: GRADUAL

## 2024-03-13 ASSESSMENT — PAIN DESCRIPTION - PAIN TYPE
TYPE: ACUTE PAIN
TYPE: ACUTE PAIN

## 2024-03-13 ASSESSMENT — PAIN DESCRIPTION - ORIENTATION
ORIENTATION: MID
ORIENTATION: MID
ORIENTATION: LOWER
ORIENTATION: MID;LOWER

## 2024-03-13 NOTE — L&D DELIVERY SUMMARY NOTE
[facility]          [address] [place] [address]                         LABOR AND DELIVERY NOTE      PATIENT NAME: JANIYA WORTHINGTON               : 1985  MED REC NO: [id number]                      ROOM: [address] [place] [address]  ACCOUNT NO: [id number]                       ADMIT DATE: 2024  PROVIDER: Sarah Flesch Sabin, MD      DATE OF PROCEDURE:  2024    The patient is a G3, P2, at 38 weeks, spontaneous rupture of membranes.  The patient received antibiotics for group beta strep positive as well as Pitocin for augmentation.  Artificial rupture of membranes of a fore-bag was performed.  The patient quickly progressed to complete and pushed to have a spontaneous vaginal delivery of a baby girl.  A loose nuchal cord x1 was easily reduced.  Apgars were 9 and 9.  The placenta delivered spontaneously.  A manual exploration was performed as well as repair of a second-degree laceration with 3-0 Vicryl.  The quantitative blood loss was 200 mL.  The bladder was catheterized for 300 mL.  A vaginal sweep ensured no retained sponges and counts were correct.          SARAH FLESCH SABIN, MD      D:  2024 14:13:28     T:  2024 20:32:02     SFS/AQS  Job #:  [id number]     Doc#:  [id number]

## 2024-03-13 NOTE — DISCHARGE SUMMARY
Department of Obstetrics and Gynecology  Postpartum Discharge Summary      Admit Date: 3/12/2024    Admit Diagnosis: Normal labor [O80, Z37.9]    Discharge Date: 3/13/24.  Any delay in discharge from ordered D/C date due to  factors.    Discharge Diagnoses: spontaneous vaginal delivery       Medication List        START taking these medications      docusate 100 MG Caps  Commonly known as: COLACE, DULCOLAX  Take 100 mg by mouth 2 times daily     ibuprofen 800 MG tablet  Commonly known as: ADVIL;MOTRIN  Take 1 tablet by mouth in the morning and 1 tablet at noon and 1 tablet in the evening.            CONTINUE taking these medications      IRON PO     PRENATAL ADVANTAGE PO            STOP taking these medications      CALCIUM 1200 PO     Nurtec 75 MG Tbdp  Generic drug: Rimegepant Sulfate     sertraline 100 MG tablet  Commonly known as: ZOLOFT               Where to Get Your Medications        These medications were sent to [place] [id number] [facility] - P [phone] - F [phone] [address] [facility] [address]      Phone: [phone]   docusate 100 MG Caps  ibuprofen 800 MG tablet         Service: Obstetrics    Consults: none    Significant Diagnostic Studies: none    Postpartum complications: none     Condition at Discharge: good    Hospital Course: uncomplicated    Discharge Instructions:     Activity: as tolerated    Diet: regular diet    Instructions: No intercourse and nothing in the vagina for 6 weeks. Do not drive while using pain medications. Keep any wounds clean and dry    Discharge to: Home    Disposition / Follow up: Return to office in 6 weeks    Home Health Nurse visit within 24-48 h if qualifies    [place] Data:  Apgars:  Information for the patient's :  Rell Miranda [[id number]]   APGAR One: 9   Information for the patient's :  Rell Miranda [[id number]]   APGAR Five: 9   Birth Weight:  Information for

## 2024-03-13 NOTE — PLAN OF CARE
Problem: Pain  Goal: Verbalizes/displays adequate comfort level or baseline comfort level  Outcome: Progressing  Flowsheets  Taken 3/13/2024 0022 by Radha Schaeffer RN  Verbalizes/displays adequate comfort level or baseline comfort level:   Encourage patient to monitor pain and request assistance   Assess pain using appropriate pain scale   Administer analgesics based on type and severity of pain and evaluate response   Implement non-pharmacological measures as appropriate and evaluate response   Consider cultural and social influences on pain and pain management   Notify Licensed Independent Practitioner if interventions unsuccessful or patient reports new pain  Taken 3/12/2024 204 by Radha Schaeffer RN  Verbalizes/displays adequate comfort level or baseline comfort level:   Encourage patient to monitor pain and request assistance   Assess pain using appropriate pain scale   Administer analgesics based on type and severity of pain and evaluate response   Implement non-pharmacological measures as appropriate and evaluate response   Consider cultural and social influences on pain and pain management   Notify Licensed Independent Practitioner if interventions unsuccessful or patient reports new pain     Problem: Postpartum  Goal: Experiences normal postpartum course  Description:  Postpartum OB-Pregnancy care plan goal which identifies if the mother is experiencing a normal postpartum course  Outcome: Progressing  Goal: Appropriate maternal -  bonding  Description:  Postpartum OB-Pregnancy care plan goal which identifies if the mother and  are bonding appropriately  Outcome: Progressing  Goal: Establishment of infant feeding pattern  Description:  Postpartum OB-Pregnancy care plan goal which identifies if the mother is establishing a feeding pattern with their   Outcome: Progressing  Goal: Incisions, wounds, or drain sites healing without S/S of infection  Outcome: 
  Problem: Vaginal Birth or  Section  Goal: Fetal and maternal status remain reassuring during the birth process  Description:  Birth OB-Pregnancy care plan goal which identifies if the fetal and maternal status remain reassuring during the birth process  Outcome: Progressing     Problem: Pain  Goal: Verbalizes/displays adequate comfort level or baseline comfort level  Outcome: Progressing     Problem: Infection - Adult  Goal: Absence of infection at discharge  Outcome: Progressing  Goal: Absence of infection during hospitalization  Outcome: Progressing  Goal: Absence of fever/infection during anticipated neutropenic period  Outcome: Progressing     Problem: Safety - Adult  Goal: Free from fall injury  Outcome: Progressing     Problem: Chronic Conditions and Co-morbidities  Goal: Patient's chronic conditions and co-morbidity symptoms are monitored and maintained or improved  Outcome: Progressing     
identifies if the mother and  are bonding appropriately  3/13/2024 1550 by Reyna Hermosillo RN  Outcome: Completed  3/13/2024 0623 by Mariam Bowman RN  Outcome: Progressing  Goal: Establishment of infant feeding pattern  Description:  Postpartum OB-Pregnancy care plan goal which identifies if the mother is establishing a feeding pattern with their   3/13/2024 1550 by Reyna Hermosillo RN  Outcome: Completed  3/13/2024 0623 by Mariam Bomwan RN  Outcome: Progressing  Goal: Incisions, wounds, or drain sites healing without S/S of infection  3/13/2024 1550 by Reyna Hermosillo RN  Outcome: Completed  3/13/2024 0623 by Mariam Bowman RN  Outcome: Progressing  Flowsheets  Taken 3/13/2024 0022 by Radha Schaeffer RN  Incisions, Wounds, or Drain Sites Healing Without Sign and Symptoms of Infection: TWICE DAILY: Assess and document skin integrity  Taken 3/12/2024 2045 by Radha Schaeffer RN  Incisions, Wounds, or Drain Sites Healing Without Sign and Symptoms of Infection: TWICE DAILY: Assess and document skin integrity     Problem: Infection - Adult  Goal: Absence of infection at discharge  3/13/2024 1550 by Reyna Hermosillo RN  Outcome: Completed  3/13/2024 0623 by Mariam Bowman RN  Outcome: Progressing  Goal: Absence of infection during hospitalization  3/13/2024 1550 by Reyna Hermosillo RN  Outcome: Completed  3/13/2024 0623 by Mariam Bowman RN  Outcome: Progressing  Goal: Absence of fever/infection during anticipated neutropenic period  Outcome: Completed     Problem: Safety - Adult  Goal: Free from fall injury  Outcome: Completed     Problem: Discharge Planning  Goal: Discharge to home or other facility with appropriate resources  Outcome: Completed     Problem: Chronic Conditions and Co-morbidities  Goal: Patient's chronic conditions and co-morbidity symptoms are monitored and maintained or improved  Outcome: Completed

## 2024-03-13 NOTE — PROGRESS NOTES
CLINICAL PHARMACY NOTE: MEDS TO BEDS    Total # of Prescriptions Filled: 0   The following medications were delivered to the patient:  Current Discharge Medication List        START taking these medications    Details   ibuprofen (ADVIL;MOTRIN) 800 MG tablet Take 1 tablet by mouth in the morning and 1 tablet at noon and 1 tablet in the evening.  Qty: 120 tablet, Refills: 3      docusate sodium (COLACE, DULCOLAX) 100 MG CAPS Take 100 mg by mouth 2 times daily  Qty: 60 capsule, Refills: 1               Additional Documentation:  Pt doesn't need meds from retail pharmacy she has at home, on profile if she changes her mind

## 2024-03-13 NOTE — FLOWSHEET NOTE
Infant taken to procedure room for bath, ID bands checked and verified, Bath explained to MOB, verbalized understanding and gave permission to do bath.

## 2024-03-13 NOTE — FLOWSHEET NOTE

## 2024-03-13 NOTE — PROGRESS NOTES
Department of Obstetrics and Gynecology  Vaginal Delivery Postpartum Rounds    SUBJECTIVE:  Pain is controlled with Tylenol or non-steroidal anti-inflammatory drugs. Her lochia is normal.    OBJECTIVE:  Vital Signs: /80   Pulse 86   Temp 98 °F (36.7 °C) (Oral)   Resp 18   Ht 1.575 m (5' 2\")   Wt 59.9 kg (132 lb)   LMP 2023 (Exact Date)   SpO2 97%   Breastfeeding Unknown   BMI 24.14 kg/m²   Appearance/Psychiatric: awake, alert, cooperative, no apparent distress, appears stated age  Constitutional: The patient is well nourished.  Cardiovascular: She does not have edema.  Respiratory: Respiratory effort is normal.  Gastrointestinal: Soft, non tender, uterine fundus is firm below umbilicus  Extremities: nontender to palpation  Perineum: intact     LABS / IMAGING:    Lab Results   Component Value Date/Time    WBC 11.6 2024 06:10 AM    RBC 3.84 2024 06:10 AM    HGB 12.2 2024 06:10 AM    HCT 34.3 2024 06:10 AM    MCV 89.3 2024 06:10 AM    MCH 31.7 2024 06:10 AM    MCHC 35.4 2024 06:10 AM    RDW 12.7 2024 06:10 AM     2024 06:10 AM    MPV 7.9 2024 06:10 AM       ASSESSMENT:    Postpartum Day 1 s/p     PLAN:   1. Continue routine postpartum care   2. Female infant, breast feeding.  3. Discharge home on Postpartum Day 1  4. Return to office in 6 weeks   5. Postpartum instructions reviewed and all patient's Questions answered    Electronically signed by Maria Burleson MD on 3/13/2024 at 9:53 AM

## 2024-03-13 NOTE — PROGRESS NOTES
[facility] Department of Anesthesiology  Post-Anesthesia Note       Name:  Larissa Miranda                                         Age:  38 y.o.  MRN:  [id number]   37w6d  OB History          3    Para   3    Term   3            AB        Living   3         SAB        IAB        Ectopic        Molar        Multiple   0    Live Births   3                Anesthesia Post Op Pain Management: yes -       Larissa Miranda was evaluated on postpartum day 2.  She expresses no concerns regarding her anesthesia care at this time.        Last Vitals & Oxygen Saturation: /68   Pulse 78   Temp 36.7 °C (98.1 °F) (Oral)   Resp 16   Ht 1.575 m (5' 2\")   Wt 59.9 kg (132 lb)   LMP 2023 (Exact Date)   SpO2 97%   Breastfeeding Unknown   BMI 24.14 kg/m²   No data found.    Anesthesia: epidural    Level of consciousness: awake, alert, and oriented    Respiratory: stable     Cardiovascular: stable     Hydration: stable     PONV:  none    Pruritis: mild    Post Dural Puncture Headache: denies    Post-op pain: adequate analgesia    Out of bed and ambulating without difficulty: Yes    Post-op assessment: no apparent anesthetic complications and tolerated procedure well    Complications:   none      Jann Jimenez, APRN - CRNA  2024   7:26 AM

## 2024-03-25 ENCOUNTER — LACTATION ENCOUNTER (OUTPATIENT)
Dept: LABOR AND DELIVERY | Age: 39
End: 2024-03-25

## 2024-03-25 NOTE — LACTATION NOTE
This note was copied from a baby's chart.   called Dr. Yudi Huffman. SBAR given. Infant fed second time and transferred 48 mL. Infant took 11 mL formula afterwards. Mother has pumped. Procedure site WNL without bleeding or swelling. Infant vital signs WNL, infant currently sleeping in mom's arms. Dr. Huffman states infant may go home.    Discharge order noted. Confirmed identity mother/infant with name/ on patient and mother sticker. Reviewed discharge instructions and wound care with mother. Reviewed feeding plan in depth with mother. These instructions will be copied into AVS and emailed to mother per her preference.  Mother verbalized understanding of all information given.    Feeding Plan:    Breast feed first, preferably when infant awake and showing hunger cues, at least every 3 hours. Offer both breasts every feeding.   If infant feeds on both breasts, offer 15 mL previously expressed breast milk or formula afterwards.  If infant feeds on one breast, offer 30 mL supplement. If infant does not latch at all, or is too sleepy, offer 60-90 ml supplement.  If infant is fussy, sleepy or not interested, do not try breastfeeding for more than 10-15 minutes. Stop and offer supplement.   Infant should be fed every 3 hours.   Mother should use double electric breast pump any time infant is supplemented or fed a bottle for any reason. Mother should pump both breasts at the same time for 15 minutes, using breast massage and hand expression to maximize milk supply.   Follow proper milk storage and handling guides.   Watch the procedure site. If any bleeding, swelling, fever or pain noted, call pediatrician or hospital.   Call LC after pediatrician weight check on Wednesday for follow up. [phone]. Leave a voicemail if no answer and  will call you back. Call sooner if needed.    Maru ALBARRANN, RN, IBCLC   
This note was copied from a baby's chart.  1243 Procedure site checked. No bleeding noted. Infant asleep, in no distress.    aMru ALBARRANN, RN, IBCLC  
This note was copied from a baby's chart.  Dr. Huffman at bedside  
This note was copied from a baby's chart.  Dr. Huffman at bedside, obtaining consent from mother for frenotomy procedure.    Maru ALBARRANN RN IBCLC  Lactation Consultant  
This note was copied from a baby's chart.  Infant starting to stir. Mother requesting to feed infant. Pre feeding weight obtained. 3390 grams.  Frenotomy site assessed - no bleeding noted.  Infant given to mother to hold/feed. Mother independently latched her on left side. She latched immediately and mother notes and immediate improvement in comfort. Mother states her latch feels stronger and more like pullling/tugging than biting.  Since mother's breasts so full, infant pulled off with let down and milk sprayed out. Once let down subsided, mother latched Ivania back on and she nursed on and off for 9 minutes before becoming sleepy and would no longer feed. Post feeding weight obtained. Infant transferred 44 mL.   suggested offering supplement bottle and mother agreed.   suggested mother pump to full empty her breasts and she agreed.       offered formula supplement bottle with slow flow nipple. 30 mL offered, infant was sleepy but took 11 mL. Some loss of fluid noted around mouth. Infant burped and fell asleep, content.     Mother obtained 80 mL in 15 minutes of double pumping. Mother states breasts feel much better.     Maru Pichardo, BSN, RN, IBCLC  
This note was copied from a baby's chart.  LC spoke with Dr. Yudi Huffman. SBAR given: , full term delivered here at [address] [facility]. Did not see LC during visit. Having pain with latching. Now weight gain concerns from pediatrician. Father shared his concerns over infant's heart shaped tongue.   Infant had feeding with LC, transferred 48 mL in 9 minutes total feeding time. Loss of fluid noted with let down. Reviewed strategies with  mom to manage let down.    MD states will come evaluate patient in outpatient lactation office.    Maru ALBARRANN, RN, IBCLC  
This note was copied from a baby's chart.  Mother and infant escorted out of unit, after verifying mother/infant sticker again. Mother carried infant in car seat. Thankful for support.     AVIS YenN, RN, IBCLC  
This note was copied from a baby's chart.  Mother carried Ivania to procedure room while in car seat. Mor BURROUGHS, BISI Quintanilla RN, Dr. Huffman accompanied both of them to procedure room.     Maru ALBARRANN, RN, IBCLC  
This note was copied from a baby's chart.  Mother eating snack in lactation office. Procedure site assessed by this RN. No bleeding noted. Infant currently sleeping, in no distress.    Maru FRANCIS, RN, [facility]  
This note was copied from a baby's chart.  Procedure completed. Mother carried Ivania in car seat back to lactation office. Confirmed correct mom and baby x 2 RN.    Maru ALBARRANN, RN, IBCLC  
This note was copied from a baby's chart.  Procedure site checked. No bleeding or swelling noted. Infant asleep, in no distress.    Vital signs obtained. , R 40, Temp 98.4 ax. Infant tolerated well.  
This note was copied from a baby's chart.  Time out performed. Please see flow sheet for details. BISI Quintanilla RN and Maru Pichardo RN present with Dr. Huffman in procedure room. Mother also present for time out, but stepped out of room for the actual procedure. Mother teary eyed, comfort provided.    Maru FRANCIS, RN, IBCLC  
weigh scale. 3426 grams. Infant handed to mother to hold/feed.   Mother independently began feeding Ivania on right breast, in cradle hold. Reviewed positioning and latch on techniques. Reinforced importance of lining nose to nipple, holding belly to belly and waiting for wide open mouth before quickly bringing baby onto breast. She latched immediately at 1058 and sustained spontaneous rhythmical sucking with gulping swallows for 6 minutes. She had loss of fluid around her mouth with let down. She pulled off a few times due to fast let down and milk sprayed out of mom's nipple.  She easily latched back on each time. Once she became sleepy, she self detached from the breast and coughed when she let go.  Mother shared her older 2 children have colds and Ivania started with cough/congestion 2 days ago. Mother states she has used saline spray and baby Su and obtained green discharge from Ivania's nose. Mother denies that Ivania has had any fevers at home.  At 1108 mother latched Ivania again in cradle hold to right side. LC encouraged more of a laid back position, using gravity to aid Ivania with managing the fast flow of milk. She latched for 3 minutes before self detaching, falling asleep and would not latch again despite mother continuing to offer the breast.  Post feeding weight revealed 48 mL transfer.   Reviewed normal feeding patterns and volume goals. For 13 days old, would expect milk transfer around 60-90 mL per feeding, every 3 hours.   LC reviewed plan of care and answered all questions mother asked.    Maru Pichardo, BSN, RN, IBCLC

## 2024-04-08 ENCOUNTER — PATIENT MESSAGE (OUTPATIENT)
Dept: DERMATOLOGY | Age: 39
End: 2024-04-08

## 2024-05-24 ENCOUNTER — TELEPHONE (OUTPATIENT)
Dept: ADMINISTRATIVE | Age: 39
End: 2024-05-24

## 2024-05-24 NOTE — TELEPHONE ENCOUNTER
Submitted PA for [facility]  Via [facility] Key: [id number]  STATUS: NOT SENT.    I need a DX code to send in with PA.    If this requires a response please respond to the pool ( P [facility] MEDICATION PRE-AUTH).      Thank you please advise patient.

## 2024-05-28 NOTE — TELEPHONE ENCOUNTER
Submitted PA for [facility] [id number]  STATUS: PENDING.    Follow up done daily; if no decision with in three days we will refax.  If another three days goes by with no decision will call the insurance for status.

## 2024-11-18 ENCOUNTER — PATIENT MESSAGE (OUTPATIENT)
Dept: DERMATOLOGY | Age: 39
End: 2024-11-18

## 2024-11-18 RX ORDER — MOMETASONE FUROATE 1 MG/G
OINTMENT TOPICAL
Qty: 45 G | Refills: 2 | Status: SHIPPED | OUTPATIENT
Start: 2024-11-18

## 2025-01-27 ENCOUNTER — OFFICE VISIT (OUTPATIENT)
Age: 40
End: 2025-01-27
Payer: COMMERCIAL

## 2025-01-27 DIAGNOSIS — D22.9 MULTIPLE NEVI: ICD-10-CM

## 2025-01-27 DIAGNOSIS — L24.9 IRRITANT HAND DERMATITIS: Primary | ICD-10-CM

## 2025-01-27 DIAGNOSIS — L20.84 INTRINSIC ATOPIC DERMATITIS: ICD-10-CM

## 2025-01-27 DIAGNOSIS — L82.1 SK (SEBORRHEIC KERATOSIS): ICD-10-CM

## 2025-01-27 PROCEDURE — 99213 OFFICE O/P EST LOW 20 MIN: CPT | Performed by: DERMATOLOGY

## 2025-01-27 NOTE — PROGRESS NOTES
[facility] Dermat[facility]  Nicolas Pyle MD  [phone]      Larissa Miranda  1985    39 y.o. female     Date of Visit: 1/27/2025    Chief Complaint: skin moles    History of Present Illness:    History of Present Illness  The patient is a 39-year-old  female, last evaluated one year ago, presenting for a follow-up visit. She has a family history of melanoma in situ and a personal history of multiple nevi and seborrheic keratoses.    Dermatitis  - Recurrent episodes primarily localized to the hands, occasionally extending to the elbows and knees  - Exacerbated by nocturnal perspiration  - Attributed to frequent hand washing due to caring for three children under the age of five  - Characterized by deep fissures along the tips of her fingers, particularly during the winter months  - Recently started using a new mildly scented body butter cream purchased from [employer] and inquires about its potential impact  - Symptomatic relief achieved with the application of mometasone ointment before bedtime    Nevi Concerns  - Concern regarding nevi located beneath her breasts, with no associated discomfort  - Presence of benign nevi on her scalp and a nevus on the sole of her foot, both previously evaluated  - Adheres to diligent sun protection practices, including the use of sunscreen on her body during the summer and incorporating it into her facial makeup routine    Tretinoin Use  - Previously prescribed tretinoin for facial use between the births of her two children  - Questions its efficacy    Supplemental information: The patient is currently undecided about expanding her family.    FAMILY HISTORY  She has a notable family history of melanoma in situ.    MEDICATIONS  Current: mometasone, triamcinolone cream         She has a family history of melanoma-her sister and father both have a history of melanoma in situ.     Hand dermatitis     Mometasone ointment      Past Medical History, Family History,

## 2025-07-23 ENCOUNTER — HOSPITAL ENCOUNTER (OUTPATIENT)
Dept: CT IMAGING | Age: 40
Discharge: HOME | End: 2025-07-23
Payer: COMMERCIAL

## 2025-07-23 DIAGNOSIS — R10.30 LOWER ABDOMINAL PAIN: ICD-10-CM

## 2025-07-23 DIAGNOSIS — R14.0 ABDOMINAL BLOATING: ICD-10-CM

## 2025-07-23 DIAGNOSIS — R10.11 RUQ ABDOMINAL PAIN: ICD-10-CM

## 2025-07-23 PROCEDURE — 74177 CT ABD & PELVIS W/CONTRAST: CPT

## 2025-07-23 PROCEDURE — 6360000004 HC RX CONTRAST MEDICATION: Performed by: NURSE PRACTITIONER

## 2025-07-23 RX ORDER — IOPAMIDOL 755 MG/ML
75 INJECTION, SOLUTION INTRAVASCULAR
Status: COMPLETED | OUTPATIENT
Start: 2025-07-23 | End: 2025-07-23

## 2025-07-23 RX ADMIN — IOPAMIDOL 75 ML: 755 INJECTION, SOLUTION INTRAVENOUS at 09:36

## 2025-07-24 ENCOUNTER — HOSPITAL ENCOUNTER (OUTPATIENT)
Dept: ULTRASOUND IMAGING | Age: 40
Discharge: HOME | End: 2025-07-24
Attending: INTERNAL MEDICINE
Payer: COMMERCIAL

## 2025-07-24 DIAGNOSIS — R10.11 RUQ ABDOMINAL PAIN: ICD-10-CM

## 2025-07-24 PROCEDURE — 76705 ECHO EXAM OF ABDOMEN: CPT

## 2025-07-31 ENCOUNTER — HOSPITAL ENCOUNTER (OUTPATIENT)
Dept: NUCLEAR MEDICINE | Age: 40
Discharge: HOME | End: 2025-07-31
Attending: INTERNAL MEDICINE
Payer: COMMERCIAL

## 2025-07-31 DIAGNOSIS — R10.11 RUQ ABDOMINAL PAIN: ICD-10-CM

## 2025-07-31 PROCEDURE — 78227 HEPATOBIL SYST IMAGE W/DRUG: CPT

## 2025-07-31 PROCEDURE — A9537 TC99M MEBROFENIN: HCPCS | Performed by: INTERNAL MEDICINE

## 2025-07-31 PROCEDURE — 3430000000 HC RX DIAGNOSTIC RADIOPHARMACEUTICAL: Performed by: INTERNAL MEDICINE

## 2025-07-31 RX ADMIN — Medication 6 MILLICURIE: at 09:16

## (undated) DEVICE — FORCEPS BX 240CM 2.4MM L NDL RAD JAW 4 M00513334